# Patient Record
Sex: MALE | Race: WHITE | NOT HISPANIC OR LATINO | Employment: FULL TIME | URBAN - METROPOLITAN AREA
[De-identification: names, ages, dates, MRNs, and addresses within clinical notes are randomized per-mention and may not be internally consistent; named-entity substitution may affect disease eponyms.]

---

## 2017-02-10 ENCOUNTER — ALLSCRIPTS OFFICE VISIT (OUTPATIENT)
Dept: OTHER | Facility: OTHER | Age: 42
End: 2017-02-10

## 2017-06-16 ENCOUNTER — GENERIC CONVERSION - ENCOUNTER (OUTPATIENT)
Dept: OTHER | Facility: OTHER | Age: 42
End: 2017-06-16

## 2017-08-07 ENCOUNTER — GENERIC CONVERSION - ENCOUNTER (OUTPATIENT)
Dept: OTHER | Facility: OTHER | Age: 42
End: 2017-08-07

## 2017-08-25 ENCOUNTER — GENERIC CONVERSION - ENCOUNTER (OUTPATIENT)
Dept: OTHER | Facility: OTHER | Age: 42
End: 2017-08-25

## 2017-09-08 ENCOUNTER — GENERIC CONVERSION - ENCOUNTER (OUTPATIENT)
Dept: OTHER | Facility: OTHER | Age: 42
End: 2017-09-08

## 2017-09-09 LAB
A/G RATIO (HISTORICAL): 2 (ref 1.2–2.2)
ALBUMIN SERPL BCP-MCNC: 4.6 G/DL (ref 3.5–5.5)
ALP SERPL-CCNC: 59 IU/L (ref 39–117)
ALT SERPL W P-5'-P-CCNC: 50 IU/L (ref 0–44)
AST SERPL W P-5'-P-CCNC: 43 IU/L (ref 0–40)
BILIRUB SERPL-MCNC: 0.6 MG/DL (ref 0–1.2)
BUN SERPL-MCNC: 14 MG/DL (ref 6–24)
BUN/CREA RATIO (HISTORICAL): 15 (ref 9–20)
CALCIUM SERPL-MCNC: 9.6 MG/DL (ref 8.7–10.2)
CHLORIDE SERPL-SCNC: 99 MMOL/L (ref 96–106)
CHOLEST SERPL-MCNC: 124 MG/DL (ref 100–199)
CHOLEST/HDLC SERPL: 3.1 RATIO UNITS (ref 0–5)
CO2 SERPL-SCNC: 26 MMOL/L (ref 18–29)
CREAT SERPL-MCNC: 0.92 MG/DL (ref 0.76–1.27)
DEPRECATED RDW RBC AUTO: 13 % (ref 12.3–15.4)
EGFR AFRICAN AMERICAN (HISTORICAL): 117 ML/MIN/1.73
EGFR-AMERICAN CALC (HISTORICAL): 101 ML/MIN/1.73
GLUCOSE SERPL-MCNC: 84 MG/DL (ref 65–99)
HCT VFR BLD AUTO: 44 % (ref 37.5–51)
HDLC SERPL-MCNC: 40 MG/DL
HGB BLD-MCNC: 15 G/DL (ref 12.6–17.7)
LDLC SERPL CALC-MCNC: 63 MG/DL (ref 0–99)
MCH RBC QN AUTO: 29.6 PG (ref 26.6–33)
MCHC RBC AUTO-ENTMCNC: 34.1 G/DL (ref 31.5–35.7)
MCV RBC AUTO: 87 FL (ref 79–97)
PLATELET # BLD AUTO: 268 X10E3/UL (ref 150–379)
POTASSIUM SERPL-SCNC: 5 MMOL/L (ref 3.5–5.2)
RBC (HISTORICAL): 5.07 X10E6/UL (ref 4.14–5.8)
SODIUM SERPL-SCNC: 140 MMOL/L (ref 134–144)
TOT. GLOBULIN, SERUM (HISTORICAL): 2.3 G/DL (ref 1.5–4.5)
TOTAL PROTEIN (HISTORICAL): 6.9 G/DL (ref 6–8.5)
TRIGL SERPL-MCNC: 106 MG/DL (ref 0–149)
VLDLC SERPL CALC-MCNC: 21 MG/DL (ref 5–40)
WBC # BLD AUTO: 6.1 X10E3/UL (ref 3.4–10.8)

## 2017-10-30 ENCOUNTER — ALLSCRIPTS OFFICE VISIT (OUTPATIENT)
Dept: OTHER | Facility: OTHER | Age: 42
End: 2017-10-30

## 2017-10-31 NOTE — PROGRESS NOTES
Assessment  Assessed    1  Cath Stent 1 Proximal Left Anterior Descending Artery   2  Coronary artery disease (414 00) (I25 10)   3  Family history of myocardial infarction (V17 3) (Z82 49)   4  History of anterolateral myocardial infarction (412) (I25 2)   5  HTN (hypertension) (401 9) (I10)   6  Hypercholesteremia (272 0) (E78 00)   7  Impaired fasting glucose (790 21) (R73 01)    Plan  Cath Stent 1 Proximal Left Anterior Descending Artery, Coronary artery disease, Family  history of myocardial infarction, PMH: History of anterolateral myocardial infarction, HTN  (hypertension), Hypercholesteremia, Impaired fasting glucose    · (1) COMPREHENSIVE METABOLIC PANEL; Status:Active; Requested for:30Oct2018; Perform:[facility]; Due:30Oct2019;Ordered; For:Cath Stent 1 Proximal Left Anterior Descending Artery, Coronary artery disease, Family history of myocardial infarction, PMH: History of anterolateral myocardial infarction, HTN (hypertension), Hypercholesteremia, Impaired fasting glucose; Ordered By:Donato Moon;  Coronary artery disease    · Clopidogrel Bisulfate 75 MG Oral Tablet   Rx By: Savannah Adrian; Dispense: 90 Days ; #:90 TAB; Refill: 3;For: Coronary artery disease; ROBB = N; Sent To: [facility]; Last Updated By: Nadine Edwards; 10/30/2017 11:59:47 AM   · (1) LIPID PANEL, FASTING; Status:Active; Requested for:30Oct2018; Perform:[facility]; Due:30Oct2019;Ordered; For:Coronary artery disease; Ordered By:Donato Moon;   · Follow-up visit in 1 year Evaluation and Treatment  Follow-up  Status: Hold For -  Scheduling  Requested for: 47QTC9707   Ordered; For: Coronary artery disease; Ordered By: Nadine Edwards Performed:  Due: 81DJE7577  Coronary artery disease, Health Maintenance    · EKG/ECG- POC; Status:Complete;   Done: 24FYT2939 11:45AM   Perform: In Office; Last Updated [place] Robyn; 10/30/2017 11:45:26 AM;Ordered; For:Coronary artery disease, Health Maintenance; Ordered By:Donato Moon;          Stress Test Exercise; Status:Active; Requested RRF:45NDK4288;   Perform:[facility]; [id number];JD; For:Coronary artery disease; Ordered By:Donato Moon;      Discussion/Summary  Cardiology Discussion Summary Free Text Note Form [facility]:   Dear Dr Ghulam Spence,  had the pleasure of seeing Yani Meyers for a follow-up visit for coronary artery disease at the [facility] office   - Anterior wall MI 12/15, preserved LVEF, 90% LAD s/p ADELAIDA, with small vessel disease as well  He is on ASA and Plavix  Treadmill stress test  was unremarkable with over 12 minutes of exercise  He has a physical job  Not a lot of regimented exercise outside of work but no exertional symptoms  abuse - he quit  - continues on atorvastatin with excellent lipid control   - well controlled  fasting glucose - has normalized  Latest was 84  visit with surveillance labs  Chief Complaint  Chief Complaint Free Text Note Form: Six month follow up with EKG   Chief Complaint Chronic Condition [facility]: Patient is here today for follow up of chronic conditions described in HPI  History of Present Illness  Cardiology HPI Free Text Note Form [facility]: Yani Meyers returns for a follow-up visit  He is doing very well without any cardiovascular symptoms  He denies palpitations, lower extremity edema, shortness of breath, dyspnea on exertion, chest pain, lightheadedness, edema  He has been tolerating medical therapy well  He has been on clopidogrel now 2 years since his infarct  He has normal LV function  He takes aspirin, statin  His cholesterol levels continue to be under excellent control  He does have minimally elevated liver function tests, less than 1 times normal  This predates his statin usage as well  He has a very physically active job, has little time more energy for regimented exercise outside of work  He offers no exertional symptoms  He remains tobacco free  Says he has no interest in smoking  Active Problems  Problems    1   Anxiety (300 00) (F41 9)   2  Cath Stent 1 Proximal Left Anterior Descending Artery   3  Coronary artery disease (414 00) (I25 10)   4  Epididymitis, left (604 90) (N45 1)   5  Family history of myocardial infarction (V17 3) (Z82 49)   6  Glaucoma (365 9) (H40 9)   7  Heel pain, right (729 5) (M79 671)   8  History of anterolateral myocardial infarction (412) (I25 2)   9  HTN (hypertension) (401 9) (I10)   10  Hypercholesteremia (272 0) (E78 00)   11  Impaired fasting glucose (790 21) (R73 01)   12  Left foot pain (729 5) (M79 672)   13  Lump of skin (782 2) (R22 9)   14  Denied: History of Mental health disorder   15  No advance directives (V49 89) (Z78 9)   16  Numerous moles (216 9) (D22 9)   17  Sciatica (724 3) (M54 30)   18  Denied: History of Substance abuse   19  Vitamin D deficiency (268 9) (E55 9)    Past Medical History  Problems    1  History of Diaphoresis (780 8) (R61)   2  History of anterolateral myocardial infarction (412) (I25 2)   3  History of cardiac catheterization (V45 89) (Z98 890)   4  History of chest pain (V13 89) (Z87 898)   5  History of hypercholesterolemia (V12 29) (Z86 39)   6  Denied: History of Mental health disorder   7  Denied: History of Substance abuse  Active Problems And Past Medical History Reviewed: The active problems and past medical history were reviewed and updated today  Surgical History  Problems    1  History of Appendectomy   2  History of Eye Surgery   3  History of Tonsillectomy    Family History  Mother    1  Family history of blindness (V19 0) (Z82 1)   2  No family history of mental disorder   3  Denied: Family history of Substance abuse in family  Father    4  Family history of cardiac disorder (V17 49) (Z82 49)   5  Family history of diabetes mellitus (V18 0) (Z83 3)   6  No family history of mental disorder   7  Denied: Family history of Substance abuse in family  Maternal Aunt    8  Family history of blindness (V19 0) (Z82 1)  Family History    9   Family history of heart disease (V17 49) (Z82 49)   10  Family history of hypertension (V17 49) (Z82 49)    Social History  Problems    · Always uses seat belt   · Dental care, occasionally   · Employed   · Exercise: Walking   · Former smoker (O57 18) (F04 770)   · Lives with spouse   · Living Situation: Supportive and safe   ·    · No advance directives (V49 89) (Z78 9)   · No living will   · Non drinker / no alcohol use   · Occasional caffeine consumption  Social History Reviewed: The social history was reviewed and updated today  The social history was reviewed and is unchanged  Current Meds   1  Aspirin 81 MG Oral Tablet Chewable; USE AS DIRECTED; Therapy: 86Uwb2876 to (Last Rx:62Yox0738) Ordered   2  Ativan 0 5 MG Oral Tablet; as needed; Therapy: (Jessica Wade) to Recorded   3  Atorvastatin Calcium 40 MG Oral Tablet; TAKE ONE TABLET BY MOUTH ONCE DAILY AS   DIRECTED; Therapy: 38DGZ0685 to ()  Requested for: 99LSJ7433; Last   Rx:30Jun2017 Ordered   4  Clopidogrel Bisulfate 75 MG Oral Tablet; TAKE ONE TABLET BY MOUTH ONCE DAILY; Therapy: 79FQA1043 to (IMMFKKEF:57ILK2734)  Requested for: 44CDA5983; Last   JOHNSON:99WER1098 Ordered   5  Cyclobenzaprine HCl - 10 MG Oral Tablet; 1 tab prn only  Requested for: 82PXC1011;   Last Rx:16Mxt3923 Ordered   6  Dorzolamide HCl - 2 % Ophthalmic Solution; INSTILL 1 DROP IN THE LEFT EYE TWICE   DAILY; Therapy: (Franceen Boeck) to Recorded   7  Fluorometholone 0 1 % Ophthalmic Suspension; INSTILL 1 DROP INTO LEFT EYE   ONCE DAILY; Therapy: (Franceen Boeck) to Recorded   8  Lisinopril 20 MG Oral Tablet; TAKE ONE TABLET BY MOUTH ONCE DAILY; Therapy: 63HVI2118 to [phone])  Requested for: 31ADS8767; Last   Rx:25Jan2017; Status: ACTIVE - Transmit to [facility] Verification Ordered   9  Meloxicam 7 5 MG Oral Tablet; TAKE 1 TABLET Twice daily PRN;    Therapy: 57KNB1153 to )  Requested for: 54ULB7824; Last   Rx:64Ccp4827 Ordered   10  Metoprolol Succinate ER 50 MG Oral Tablet Extended Release 24 Hour; TAKE ONE    TABLET BY MOUTH ONCE DAILY; Therapy: 79VQM2056 to ([phone])  Requested for: 19VDA6698; Last    Rx:48Vsy3603 Ordered   11  Nitrostat 0 4 MG Sublingual Tablet Sublingual; PLACE 1 TABLET UNDER THE TONGUE    AS NEEDED FOR CHEST PAIN;    Therapy: (Melba Flores) to Recorded   12  Vitamin D3 2000 UNIT Oral Tablet; 1 Tab daily; Therapy: 42WFL4455 to Recorded  Medication List Reviewed: The medication list was reviewed and updated today  Allergies  Medication    1  Penicillins   2  Zithromax TABS  Non-Medication    3  No Known Environmental Allergies   4  No Known Food Allergies    Vitals  Vital Signs    Recorded: 53PFV1199 11:43AM   Heart Rate 72   Systolic 961   Diastolic 78   Height 5 ft 8 in   Weight 173 lb    BMI Calculated 26 3   BSA Calculated 1 92     Physical Exam    Constitutional   General appearance: No acute distress, well appearing and well nourished  Eyes   Conjunctiva and Sclera examination: Conjunctiva pink, sclera anicteric  Ears, Nose, Mouth, and Throat - Oropharynx: Clear, nares are clear, mucous membranes are moist    Neck   Neck and thyroid: Normal, supple, trachea midline, no thyromegaly  Pulmonary   Respiratory effort: No increased work of breathing or signs of respiratory distress  Auscultation of lungs: Clear to auscultation, no rales, no rhonchi, no wheezing, good air movement  Cardiovascular   Auscultation of heart: Normal rate and rhythm, normal S1 and S2, no murmurs  Carotid pulses: Normal, 2+ bilaterally  Pedal pulses: Normal, 2+ bilaterally  Examination of extremities for edema and/or varicosities: Normal     Abdomen   Abdomen: Non-tender and no distention  Musculoskeletal Gait and station: Normal gait  -- Digits and nails: Normal without clubbing or cyanosis     Skin - Skin and subcutaneous tissue: Normal without rashes or lesions  Skin is warm and well perfused, normal turgor  Neurologic - Cranial nerves: II - XII intact  -- Speech: Normal     Psychiatric - Orientation to person, place, and time: Normal -- Mood and affect: Normal       Results/Data  Diagnostic Studies Reviewed Cardio: I personally reviewed the recording/images in the office today  My interpretation follows  Lab Review: 4/16 - comprehensive metabolic panel normal with creatinine 0 8  Total cholesterol 123, triglycerides 72, HDL 41, LDL 68, VLDL 14  Stress Test: 1/16 - treadmill EKG stress test - 12 5 minutes of exercise, no symptoms, normal stress test    Echocardiogram/TEETEE: 12/15 - EF 60%  Catheterization: 12/15 - 90% LAD s/p ADELAIDA  80% PDA, 60% D1  ECG Report: 1/6/16 - Sinus bradycardia, otherwise normal - sinus rhythm with sinus arrhythmia   (1) COMPREHENSIVE METABOLIC PANEL 64XIV3867 75:87FD Emaline Beat     Test Name Result Flag Reference   Glucose, Serum 84 mg/dL  65-99   BUN 14 mg/dL  6-24   Creatinine, Serum 0 92 mg/dL  0 76-1 27   BUN/Creatinine Ratio 15  9-20   Sodium, Serum 140 mmol/L  134-144   Potassium, Serum 5 0 mmol/L  3 5-5 2   Chloride, Serum 99 mmol/L     Carbon Dioxide, Total 26 mmol/L  18-29   Calcium, Serum 9 6 mg/dL  8 7-10 2   Protein, Total, Serum 6 9 g/dL  6 0-8 5   Albumin, Serum 4 6 g/dL  3 5-5 5   Globulin, Total 2 3 g/dL  1 5-4 5   A/G Ratio 2 0  1 2-2 2   Bilirubin, Total 0 6 mg/dL  0 0-1 2   Alkaline Phosphatase, S 59 IU/L     AST (SGOT) 43 IU/L H 0-40   ALT (SGPT) 50 IU/L H 0-44   eGFR If NonAfricn Am 101 mL/min/1 73  >59   eGFR If Africn Am 117 mL/min/1 73  >59     (1) CBC/ PLT (NO DIFF) 52LXG8330 11:59AM Emaline Beat     Test Name Result Flag Reference   WBC 6 1 x10E3/uL  3 4-10 8   RBC 5 07 x10E6/uL  4 14-5 80   Hemoglobin 15 0 g/dL  12 6-17 7   Hematocrit 44 0 %  37 5-51 0   MCV 87 fL  79-97   MCH 29 6 pg  26 6-33 0   MCHC 34 1 g/dL  31 5-35 7   RDW 13 0 %  12 3-15 4   Platelets 929 D05F6/EE  150-379     (1) LIPID PANEL, FASTING 03Ctm8262 11:59AM Gladis Garcia     Test Name Result Flag Reference   Cholesterol, Total 124 mg/dL  100-199   Triglycerides 106 mg/dL  0-149   HDL Cholesterol 40 mg/dL  >39   VLDL Cholesterol Rashad 21 mg/dL  5-40   LDL Cholesterol Calc 63 mg/dL  0-99   T  Chol/HDL Ratio 3 1 ratio units  0 0-5 0   T  Chol/HDL Ratio                                                             Men  Women                                               1/2 Avg  Risk  3 4    3 3                                                   Avg Risk  5 0    4 4                                                2X Avg  Risk  9 6    7 1                                                3X Avg  Risk 23 4   11 0     Signatures   Electronically signed by : Antonietta Evans MD; Oct 30 2017 11:59AM EST                       (Author)

## 2017-12-15 ENCOUNTER — GENERIC CONVERSION - ENCOUNTER (OUTPATIENT)
Dept: FAMILY MEDICINE CLINIC | Facility: HOSPITAL | Age: 42
End: 2017-12-15

## 2018-01-13 VITALS
HEART RATE: 72 BPM | WEIGHT: 178 LBS | HEIGHT: 68 IN | SYSTOLIC BLOOD PRESSURE: 112 MMHG | DIASTOLIC BLOOD PRESSURE: 80 MMHG | RESPIRATION RATE: 14 BRPM | BODY MASS INDEX: 26.98 KG/M2

## 2018-01-14 VITALS
BODY MASS INDEX: 26.22 KG/M2 | WEIGHT: 173 LBS | DIASTOLIC BLOOD PRESSURE: 78 MMHG | HEART RATE: 72 BPM | SYSTOLIC BLOOD PRESSURE: 112 MMHG | HEIGHT: 68 IN

## 2018-01-16 NOTE — MISCELLANEOUS
Message   Recorded as Task   Date: 01/19/2016 09:55 PM, Created By: Ethan Giraldo   Task Name: Follow Up   Assigned To: Cardio Howard Peguero   Regarding Patient: Pastor Arora, Status: Active   CommentMa  - 19 Jan 2016 9:55 PM     TASK CREATED  Can you please let Mr Marquez's know that his stress test was normal   He did great at 12min exercise and can continue with unrestricted activity of any kind  Called patient to let him know that his echo was normal      Active Problems    1  Acute MI, anterolateral wall (410 00) (I21 09)   2  Anxiety (300 00) (F41 9)   3  Coronary artery disease (414 00) (I25 10)   4  Diaphoresis (780 8) (R61)   5  Family history of myocardial infarction (V17 3) (Z82 49)   6  Glaucoma (365 9) (H40 9)   7  Heel pain, right (729 5) (M79 671)   8  HTN (hypertension) (401 9) (I10)   9  Hypercholesteremia (272 0) (E78 0)   10  Impaired fasting glucose (790 21) (R73 01)   11  S/P drug eluting coronary stent placement (V45 82) (Z95 5)   12  Sciatica (724 3) (M54 30)   13  Vitamin D deficiency (268 9) (E55 9)    Current Meds   1  Aspirin 81 MG Oral Tablet Chewable; USE AS DIRECTED; Therapy: 42Tnc6274 to (Last Rx:37Imk9207) Ordered   2  Atorvastatin Calcium 40 MG Oral Tablet; TAKE 1 TABLET DAILY AS DIRECTED; Therapy: 70HSD4947 to (Evaluate:59Kby2099)  Requested for: 64RMJ7661; Last   Rx:05Jan2016 Ordered   3  Clopidogrel Bisulfate 75 MG Oral Tablet; TAKE 1 TABLET DAILY  Requested for:   99DEG3553; Last Rx:15Jan2016 Ordered   4  Dorzolamide HCl - 2 % Ophthalmic Solution; INSTILL 1 DROP IN THE LEFT EYE   TWICE DAILY; Therapy: (Philip Freddy) to Recorded   5  Fluorometholone 0 1 % Ophthalmic Suspension; instill 1 drop into both eyes once daily; Therapy: (Philip Freddy) to Recorded   6  Lisinopril 20 MG Oral Tablet; TAKE ONE TABLET BY MOUTH ONCE DAILY; Therapy: 29MRW2383 to (Evaluate:17Lnz1813)  Requested for: 58ZLY8690; Last   Rx:15Jan2016 Ordered   7  Metoprolol Succinate ER 50 MG Oral Tablet Extended Release 24 Hour; TAKE ONE   TABLET BY MOUTH ONCE DAILY; Therapy: 05VAD0877 to (Evaluate:16Zaf9526)  Requested for: 20INR6142; Last   Rx:30Aug7900 Ordered   8  Nitrostat 0 4 MG Sublingual Tablet Sublingual; PLACE 1 TABLET UNDER THE TONGUE   AS NEEDED FOR CHEST PAIN;   Therapy: (Loree Mares) to Recorded   9  Vitamin D3 2000 UNIT Oral Tablet; 1 Tab daily; Therapy: 72KBK2011 to Recorded    Allergies    1  Penicillins   2  Zithromax TABS    3  No Known Environmental Allergies   4   No Known Food Allergies    Signatures   Electronically signed by : Marleni Kwan, ; Jan 22 2016  3:35PM EST                       (Author)

## 2018-02-05 ENCOUNTER — OFFICE VISIT (OUTPATIENT)
Dept: FAMILY MEDICINE CLINIC | Facility: HOSPITAL | Age: 43
End: 2018-02-05
Payer: COMMERCIAL

## 2018-02-05 VITALS
DIASTOLIC BLOOD PRESSURE: 84 MMHG | TEMPERATURE: 98.4 F | HEIGHT: 68 IN | RESPIRATION RATE: 16 BRPM | BODY MASS INDEX: 26.07 KG/M2 | SYSTOLIC BLOOD PRESSURE: 128 MMHG | WEIGHT: 172 LBS | HEART RATE: 68 BPM

## 2018-02-05 DIAGNOSIS — J01.90 ACUTE NON-RECURRENT SINUSITIS, UNSPECIFIED LOCATION: Primary | ICD-10-CM

## 2018-02-05 PROBLEM — J01.80 OTHER ACUTE SINUSITIS: Status: ACTIVE | Noted: 2018-02-05

## 2018-02-05 PROCEDURE — 99213 OFFICE O/P EST LOW 20 MIN: CPT | Performed by: PHYSICIAN ASSISTANT

## 2018-02-05 RX ORDER — CHOLECALCIFEROL (VITAMIN D3) 125 MCG
1 CAPSULE ORAL DAILY
COMMUNITY
Start: 2015-12-17

## 2018-02-05 RX ORDER — ATORVASTATIN CALCIUM 40 MG/1
1 TABLET, FILM COATED ORAL DAILY
COMMUNITY
Start: 2016-01-05 | End: 2018-03-19 | Stop reason: SDUPTHER

## 2018-02-05 RX ORDER — METOPROLOL SUCCINATE 50 MG/1
1 TABLET, EXTENDED RELEASE ORAL DAILY
COMMUNITY
Start: 2014-12-29 | End: 2018-11-08 | Stop reason: SDUPTHER

## 2018-02-05 RX ORDER — DORZOLAMIDE HCL 20 MG/ML
1 SOLUTION/ DROPS OPHTHALMIC DAILY
COMMUNITY
End: 2022-06-29

## 2018-02-05 RX ORDER — CYCLOBENZAPRINE HCL 10 MG
1 TABLET ORAL 3 TIMES DAILY PRN
COMMUNITY
End: 2018-05-07 | Stop reason: ALTCHOICE

## 2018-02-05 RX ORDER — FLUOROMETHOLONE 0.1 %
1 SUSPENSION, DROPS(FINAL DOSAGE FORM)(ML) OPHTHALMIC (EYE) DAILY
COMMUNITY

## 2018-02-05 RX ORDER — LISINOPRIL 20 MG/1
1 TABLET ORAL DAILY
COMMUNITY
Start: 2014-12-29 | End: 2018-02-16 | Stop reason: SDUPTHER

## 2018-02-05 RX ORDER — NITROGLYCERIN 0.4 MG/1
TABLET SUBLINGUAL
COMMUNITY
End: 2018-05-21 | Stop reason: SDUPTHER

## 2018-02-05 RX ORDER — LORAZEPAM 0.5 MG/1
1 TABLET ORAL DAILY PRN
COMMUNITY
End: 2018-05-07 | Stop reason: ALTCHOICE

## 2018-02-05 RX ORDER — CEFUROXIME AXETIL 250 MG/1
250 TABLET ORAL EVERY 12 HOURS SCHEDULED
Qty: 20 TABLET | Refills: 0 | Status: SHIPPED | OUTPATIENT
Start: 2018-02-05 | End: 2018-02-15

## 2018-02-05 RX ORDER — MELOXICAM 7.5 MG/1
1 TABLET ORAL 2 TIMES DAILY PRN
COMMUNITY
Start: 2014-12-23 | End: 2019-08-01 | Stop reason: SDUPTHER

## 2018-02-05 NOTE — PROGRESS NOTES
Assessment/Plan:    Sinusitis  Ceftin 250 mg  Bid for 10 days  Subjective:      Patient ID: Amrita Adamson is a 38 y o  male  38year old white male c/o sinus pain and pressure since yesterday  Took Sudafed yesterday and Ibuprofen without any relief  No runny nose, no cough, no headache, but has facial pain/pressure  No sore throat and no ear ache  Called requesting meds  But was told had to come in  Review of Systems   Constitutional: Negative  HENT: Positive for sinus pain and sinus pressure  Negative for sore throat  Respiratory: Negative  Neurological: Negative  Objective:     Physical Exam   Constitutional: He appears well-developed and well-nourished  No distress  HENT:   Head: Normocephalic and atraumatic  Nose: Nose normal    Mouth/Throat: Oropharynx is clear and moist  No oropharyngeal exudate  Tm bulging and erythematous, with congestion noted in middle ears  Face appears swollen, puffy under right eye  Eyes: Conjunctivae and EOM are normal  Right eye exhibits no discharge  Left eye exhibits no discharge  No scleral icterus  Pulmonary/Chest: Effort normal and breath sounds normal  No respiratory distress  He has no wheezes  He has no rales  Skin: He is not diaphoretic

## 2018-02-16 DIAGNOSIS — I10 ESSENTIAL HYPERTENSION: Primary | ICD-10-CM

## 2018-02-16 RX ORDER — LISINOPRIL 20 MG/1
TABLET ORAL
Qty: 30 TABLET | Refills: 11 | Status: SHIPPED | OUTPATIENT
Start: 2018-02-16 | End: 2019-03-18 | Stop reason: SDUPTHER

## 2018-03-19 DIAGNOSIS — E78.00 HYPERCHOLESTEROLEMIA: Primary | ICD-10-CM

## 2018-03-19 RX ORDER — ATORVASTATIN CALCIUM 40 MG/1
TABLET, FILM COATED ORAL
Qty: 30 TABLET | Refills: 3 | Status: SHIPPED | OUTPATIENT
Start: 2018-03-19 | End: 2018-07-27 | Stop reason: SDUPTHER

## 2018-05-07 ENCOUNTER — APPOINTMENT (INPATIENT)
Dept: CT IMAGING | Facility: HOSPITAL | Age: 43
End: 2018-05-07
Payer: COMMERCIAL

## 2018-05-07 ENCOUNTER — HOSPITAL ENCOUNTER (EMERGENCY)
Facility: HOSPITAL | Age: 43
Discharge: HOME | End: 2018-05-07
Attending: SURGERY | Admitting: SURGERY
Payer: COMMERCIAL

## 2018-05-07 VITALS
BODY MASS INDEX: 25.76 KG/M2 | OXYGEN SATURATION: 100 % | HEIGHT: 68 IN | TEMPERATURE: 98.5 F | SYSTOLIC BLOOD PRESSURE: 126 MMHG | HEART RATE: 74 BPM | DIASTOLIC BLOOD PRESSURE: 68 MMHG | RESPIRATION RATE: 18 BRPM | WEIGHT: 170 LBS

## 2018-05-07 DIAGNOSIS — K52.9 COLITIS: Primary | ICD-10-CM

## 2018-05-07 DIAGNOSIS — R11.2 NAUSEA & VOMITING: ICD-10-CM

## 2018-05-07 LAB
ALBUMIN SERPL BCP-MCNC: 4.1 G/DL (ref 3.5–5)
ALP SERPL-CCNC: 59 U/L (ref 46–116)
ALT SERPL W P-5'-P-CCNC: 38 U/L (ref 12–78)
ANION GAP SERPL CALCULATED.3IONS-SCNC: 14 MMOL/L (ref 4–13)
AST SERPL W P-5'-P-CCNC: 26 U/L (ref 5–45)
BASOPHILS # BLD AUTO: 0.01 THOUSANDS/ΜL (ref 0–0.1)
BASOPHILS NFR BLD AUTO: 0 % (ref 0–1)
BILIRUB SERPL-MCNC: 0.5 MG/DL (ref 0.2–1)
BUN SERPL-MCNC: 12 MG/DL (ref 5–25)
CALCIUM SERPL-MCNC: 9.8 MG/DL (ref 8.3–10.1)
CHLORIDE SERPL-SCNC: 105 MMOL/L (ref 100–108)
CO2 SERPL-SCNC: 24 MMOL/L (ref 21–32)
CREAT SERPL-MCNC: 1.21 MG/DL (ref 0.6–1.3)
EOSINOPHIL # BLD AUTO: 0 THOUSAND/ΜL (ref 0–0.61)
EOSINOPHIL NFR BLD AUTO: 0 % (ref 0–6)
ERYTHROCYTE [DISTWIDTH] IN BLOOD BY AUTOMATED COUNT: 12.3 % (ref 11.6–15.1)
GFR SERPL CREATININE-BSD FRML MDRD: 72 ML/MIN/1.73SQ M
GLUCOSE SERPL-MCNC: 141 MG/DL (ref 65–140)
HCT VFR BLD AUTO: 41.4 % (ref 36.5–49.3)
HGB BLD-MCNC: 14.4 G/DL (ref 12–17)
LYMPHOCYTES # BLD AUTO: 1.06 THOUSANDS/ΜL (ref 0.6–4.47)
LYMPHOCYTES NFR BLD AUTO: 10 % (ref 14–44)
MCH RBC QN AUTO: 30.1 PG (ref 26.8–34.3)
MCHC RBC AUTO-ENTMCNC: 34.8 G/DL (ref 31.4–37.4)
MCV RBC AUTO: 87 FL (ref 82–98)
MONOCYTES # BLD AUTO: 0.35 THOUSAND/ΜL (ref 0.17–1.22)
MONOCYTES NFR BLD AUTO: 3 % (ref 4–12)
NEUTROPHILS # BLD AUTO: 9.12 THOUSANDS/ΜL (ref 1.85–7.62)
NEUTS SEG NFR BLD AUTO: 87 % (ref 43–75)
PLATELET # BLD AUTO: 248 THOUSANDS/UL (ref 149–390)
PMV BLD AUTO: 10.3 FL (ref 8.9–12.7)
POTASSIUM SERPL-SCNC: 4.7 MMOL/L (ref 3.5–5.3)
PROT SERPL-MCNC: 7.2 G/DL (ref 6.4–8.2)
RBC # BLD AUTO: 4.78 MILLION/UL (ref 3.88–5.62)
SODIUM SERPL-SCNC: 143 MMOL/L (ref 136–145)
WBC # BLD AUTO: 10.54 THOUSAND/UL (ref 4.31–10.16)

## 2018-05-07 PROCEDURE — 74177 CT ABD & PELVIS W/CONTRAST: CPT

## 2018-05-07 PROCEDURE — 80053 COMPREHEN METABOLIC PANEL: CPT | Performed by: SURGERY

## 2018-05-07 PROCEDURE — 96374 THER/PROPH/DIAG INJ IV PUSH: CPT

## 2018-05-07 PROCEDURE — 99284 EMERGENCY DEPT VISIT MOD MDM: CPT

## 2018-05-07 PROCEDURE — 36415 COLL VENOUS BLD VENIPUNCTURE: CPT

## 2018-05-07 PROCEDURE — 85025 COMPLETE CBC W/AUTO DIFF WBC: CPT | Performed by: SURGERY

## 2018-05-07 RX ORDER — CIPROFLOXACIN 500 MG/1
500 TABLET, FILM COATED ORAL EVERY 12 HOURS SCHEDULED
Qty: 20 TABLET | Refills: 0 | Status: SHIPPED | OUTPATIENT
Start: 2018-05-07 | End: 2018-05-17

## 2018-05-07 RX ORDER — METRONIDAZOLE 500 MG/1
500 TABLET ORAL EVERY 8 HOURS SCHEDULED
Qty: 30 TABLET | Refills: 0 | Status: SHIPPED | OUTPATIENT
Start: 2018-05-07 | End: 2018-05-17

## 2018-05-07 RX ORDER — ONDANSETRON 2 MG/ML
4 INJECTION INTRAMUSCULAR; INTRAVENOUS ONCE
Status: COMPLETED | OUTPATIENT
Start: 2018-05-07 | End: 2018-05-07

## 2018-05-07 RX ORDER — ASPIRIN 81 MG/1
81 TABLET ORAL DAILY
COMMUNITY

## 2018-05-07 RX ORDER — ONDANSETRON 4 MG/1
4 TABLET, ORALLY DISINTEGRATING ORAL EVERY 8 HOURS PRN
Qty: 15 TABLET | Refills: 0 | Status: SHIPPED | OUTPATIENT
Start: 2018-05-07 | End: 2019-08-01 | Stop reason: ALTCHOICE

## 2018-05-07 RX ADMIN — ONDANSETRON 4 MG: 2 INJECTION INTRAMUSCULAR; INTRAVENOUS at 14:36

## 2018-05-07 RX ADMIN — IOHEXOL 100 ML: 350 INJECTION, SOLUTION INTRAVENOUS at 15:35

## 2018-05-07 RX ADMIN — SODIUM CHLORIDE 1000 ML: 0.9 INJECTION, SOLUTION INTRAVENOUS at 14:36

## 2018-05-07 NOTE — ED NOTES
PT arrives in wheelchair, repetitively stating he is cold  PT able to walk to stretcher from [facility] without assistance        Aristeo Mcdonald RN  05/07/18 7716

## 2018-05-07 NOTE — ED PROVIDER NOTES
History  Chief Complaint   Patient presents with    Vomiting     PT states hes been vomiting every half hour since 230  this am, pt states lower abd pain and diarrhea started around then too   Diarrhea     39 yo M presenting with nausea, vomiting and diarrhea onset last night around 2:30 am  Patient reports he is unable to keep anything down  Initially threw up the food he ate but now is throwing up yellowish liquid with mucus  Has had 12 episodes of vomiting and 10 episodes of diarrhea since last night  Also feels very nauseous  Denies recent intake of suspicious food or change in diet/medications  No sick contacts  Denies CP, SOB or fevers/chills  History provided by:  Patient and spouse   used: No    Vomiting   Severity:  Moderate  Duration:  12 hours  Timing:  Constant  Number of daily episodes:  12  Quality:  Undigested food and bilious material  Progression:  Unchanged  Chronicity:  New  Recent urination:  Normal  Context comment:  Spontaneous  Relieved by:  Nothing  Worsened by:  Nothing  Ineffective treatments: Ibuprofen  Associated symptoms: abdominal pain, chills and diarrhea    Associated symptoms: no cough, no fever, no headaches, no myalgias, no sore throat and no URI    Abdominal pain:     Location:  Suprapubic, LLQ and RLQ    Quality: aching      Severity:  Moderate    Onset quality:  Sudden    Duration:  12 hours    Timing:  Constant    Progression:  Unchanged    Chronicity:  New  Diarrhea:     Quality:  Watery    Number of occurrences:  10    Severity:  Moderate    Duration:  12 hours    Timing:  Constant    Progression:  Unchanged  Risk factors: no alcohol use, no sick contacts, no suspect food intake and no travel to endemic areas        Prior to Admission Medications   Prescriptions Last Dose Informant Patient Reported? Taking?    Cholecalciferol (VITAMIN D3) 2000 units TABS   Yes Yes   Sig: Take 1 tablet by mouth daily   aspirin (ECOTRIN LOW STRENGTH) 81 mg EC tablet   Yes Yes   Sig: Take 81 mg by mouth daily   atorvastatin (LIPITOR) 40 mg tablet   No Yes   Sig: TAKE ONE TABLET BY MOUTH ONCE DAILY AS DIRECTED   dorzolamide (TRUSOPT) 2 % ophthalmic solution   Yes No   Sig: Administer 1 drop into the left eye daily   fluorometholone (FML) 0 1 % ophthalmic suspension   Yes No   Sig: Administer 1 drop into the left eye daily   lisinopril (ZESTRIL) 20 mg tablet   No Yes   Sig: TAKE ONE TABLET BY MOUTH ONCE DAILY   meloxicam (MOBIC) 7 5 mg tablet   Yes No   Sig: Take 1 tablet by mouth 2 (two) times a day as needed   metoprolol succinate (TOPROL-XL) 50 mg 24 hr tablet   Yes Yes   Sig: Take 1 tablet by mouth daily   nitroglycerin (NITROSTAT) 0 4 mg SL tablet   Yes No   Sig: Place under the tongue      Facility-Administered Medications: None       Past Medical History:   Diagnosis Date    Glaucoma     Heart disease     Hypertension     Uveitis     Visual impairment        Past Surgical History:   Procedure Laterality Date    APPENDECTOMY      CARDIAC SURGERY      CORONARY STENT PLACEMENT      TONSILLECTOMY         Family History   Problem Relation Age of Onset    Mental illness Neg Hx     Substance Abuse Neg Hx      I have reviewed and agree with the history as documented  Social History   Substance Use Topics    Smoking status: Former Smoker    Smokeless tobacco: Never Used    Alcohol use No      Comment: no         Review of Systems   Constitutional: Positive for chills  Negative for fever  HENT: Negative for sore throat  Eyes: Negative  Respiratory: Negative for cough  Cardiovascular: Negative for chest pain, palpitations and leg swelling  Gastrointestinal: Positive for abdominal pain, diarrhea, nausea and vomiting  Genitourinary: Negative  Musculoskeletal: Negative for myalgias  Skin: Negative for rash and wound  Neurological: Negative for dizziness, light-headedness, numbness and headaches     All other systems reviewed and are negative  Physical Exam  ED Triage Vitals [05/07/18 1346]   Temperature Pulse Respirations Blood Pressure SpO2   98 5 °F (36 9 °C) 77 18 152/98 100 %      Temp Source Heart Rate Source Patient Position - Orthostatic VS BP Location FiO2 (%)   Temporal Monitor Lying Right arm --      Pain Score       8           Orthostatic Vital Signs  Vitals:    05/07/18 1346 05/07/18 1603   BP: 152/98 126/68   Pulse: 77 74   Patient Position - Orthostatic VS: Lying Lying       Physical Exam   Constitutional: He is oriented to person, place, and time  He appears well-developed and well-nourished  No distress  HENT:   Head: Normocephalic and atraumatic  Mouth/Throat: Oropharynx is clear and moist    Eyes: EOM are normal  Pupils are equal, round, and reactive to light  Neck: Normal range of motion  Neck supple  Cardiovascular: Normal rate, regular rhythm and normal heart sounds  No murmur heard  Pulmonary/Chest: Effort normal and breath sounds normal  No respiratory distress  Abdominal: Soft  Normal appearance and bowel sounds are normal  There is generalized tenderness (mild)  Patient dry heaving in exam room   Musculoskeletal: Normal range of motion  He exhibits no tenderness  Lymphadenopathy:     He has no cervical adenopathy  Neurological: He is alert and oriented to person, place, and time  Skin: Skin is warm and dry  No rash noted  He is not diaphoretic  Psychiatric: He has a normal mood and affect  Nursing note and vitals reviewed        ED Medications  Medications   ondansetron (ZOFRAN) injection 4 mg (4 mg Intravenous Given 5/7/18 1436)   sodium chloride 0 9 % bolus 1,000 mL (0 mL Intravenous Stopped 5/7/18 1552)   iohexol (OMNIPAQUE) 350 MG/ML injection (SINGLE-DOSE) 100 mL (100 mL Intravenous Given 5/7/18 1535)       Diagnostic Studies  Results Reviewed     Procedure Component Value Units Date/Time    Comprehensive metabolic panel [[id number]]  (Abnormal) Collected:  05/07/18 1359    Lab Status: Final result Specimen:  Blood from Hand, Left Updated:  05/07/18 1438     Sodium 143 mmol/L      Potassium 4 7 mmol/L      Chloride 105 mmol/L      CO2 24 mmol/L      Anion Gap 14 (H) mmol/L      BUN 12 mg/dL      Creatinine 1 21 mg/dL      Glucose 141 (H) mg/dL      Calcium 9 8 mg/dL      AST 26 U/L      ALT 38 U/L      Alkaline Phosphatase 59 U/L      Total Protein 7 2 g/dL      Albumin 4 1 g/dL      Total Bilirubin 0 50 mg/dL      eGFR 72 ml/min/1 73sq m     Narrative:         National Kidney Disease Education Program recommendations are as follows:  GFR calculation is accurate only with a steady state creatinine  Chronic Kidney disease less than 60 ml/min/1 73 sq  meters  Kidney failure less than 15 ml/min/1 73 sq  meters  CBC and differential [[id number]]  (Abnormal) Collected:  05/07/18 1359    Lab Status:  Final result Specimen:  Blood from Hand, Left Updated:  05/07/18 1421     WBC 10 54 (H) Thousand/uL      RBC 4 78 Million/uL      Hemoglobin 14 4 g/dL      Hematocrit 41 4 %      MCV 87 fL      MCH 30 1 pg      MCHC 34 8 g/dL      RDW 12 3 %      MPV 10 3 fL      Platelets 687 Thousands/uL      Neutrophils Relative 87 (H) %      Lymphocytes Relative 10 (L) %      Monocytes Relative 3 (L) %      Eosinophils Relative 0 %      Basophils Relative 0 %      Neutrophils Absolute 9 12 (H) Thousands/µL      Lymphocytes Absolute 1 06 Thousands/µL      Monocytes Absolute 0 35 Thousand/µL      Eosinophils Absolute 0 00 Thousand/µL      Basophils Absolute 0 01 Thousands/µL                  CT abdomen pelvis with contrast   Final Result by Paxton Stallworth MD (05/07 1604)      Suggestion of mild thickening of the left colon  While there are diverticula along the left colon, a discretely inflamed diverticula is not seen and the long segment of involvement is more suggestive of mild inflammatory or infectious colitis              Workstation performed: [id number]                    Procedures  Procedures       Phone Contacts  ED Phone Contact    ED Course                               MDM  Number of Diagnoses or Management Options  Colitis: new and requires workup  Nausea & vomiting: new and does not require workup     Amount and/or Complexity of Data Reviewed  Clinical lab tests: ordered and reviewed  Tests in the radiology section of CPT®: ordered and reviewed    Patient Progress  Patient progress: stable    CritCare Time    Disposition  Final diagnoses:   Colitis   Nausea & vomiting     Time reflects when diagnosis was documented in both MDM as applicable and the Disposition within this note     Time User Action Codes Description Comment    5/7/2018  2:49 PM Tameka Jeremiah Add [K80 20] Cholelithiasis     5/7/2018  3:03 PM Tameka Jeremiah Remove [K80 20] Cholelithiasis     5/7/2018  4:22 PM Ma Devoid [K52 9] Colitis     5/7/2018  4:28 PM Ma Devoid [R11 0] Nausea     5/7/2018  4:28 PM Tameka Jeremiah Remove [R11 0] Nausea     5/7/2018  4:28 PM Tameka Jeremiah Add [R11 2] Nausea & vomiting       ED Disposition     ED Disposition Condition Comment    Discharge    Machelle Jerez discharge to home/self care    Condition at discharge: Stable      Follow-up Information     Follow up With Specialties Details Why Contact Info    Regla Sepulveda DO Internal Medicine Call For Recheck, If symptoms worsen [address] [address]  [phone]          Discharge Medication List as of 5/7/2018  4:38 PM      START taking these medications    Details   ciprofloxacin (CIPRO) 500 mg tablet Take 1 tablet (500 mg total) by mouth every 12 (twelve) hours for 10 days, Starting Mon 5/7/2018, Until Thu 5/17/2018, Normal      metroNIDAZOLE (FLAGYL) 500 mg tablet Take 1 tablet (500 mg total) by mouth every 8 (eight) hours for 10 days, Starting Mon 5/7/2018, Until Thu 5/17/2018, Normal      ondansetron (ZOFRAN-ODT) 4 mg disintegrating tablet Take 1 tablet (4 mg total) by mouth every 8 (eight) hours as needed for nausea or vomiting for up to 5 days, Starting Mon 5/7/2018, Until Sat 5/12/2018, Normal         CONTINUE these medications which have NOT CHANGED    Details   aspirin (ECOTRIN LOW STRENGTH) 81 mg EC tablet Take 81 mg by mouth daily, Historical Med      atorvastatin (LIPITOR) 40 mg tablet TAKE ONE TABLET BY MOUTH ONCE DAILY AS DIRECTED, Normal      Cholecalciferol (VITAMIN D3) 2000 units TABS Take 1 tablet by mouth daily, Starting u 12/17/2015, Historical Med      lisinopril (ZESTRIL) 20 mg tablet TAKE ONE TABLET BY MOUTH ONCE DAILY, Normal      metoprolol succinate (TOPROL-XL) 50 mg 24 hr tablet Take 1 tablet by mouth daily, Starting Mon 12/29/2014, Historical Med      dorzolamide (TRUSOPT) 2 % ophthalmic solution Administer 1 drop into the left eye daily, Historical Med      fluorometholone (FML) 0 1 % ophthalmic suspension Administer 1 drop into the left eye daily, Historical Med      meloxicam (MOBIC) 7 5 mg tablet Take 1 tablet by mouth 2 (two) times a day as needed, Starting Tue 12/23/2014, Historical Med      nitroglycerin (NITROSTAT) 0 4 mg SL tablet Place under the tongue, Historical Med           No discharge procedures on file      ED Provider  Electronically Signed by           Robson Sierra PA-C  05/18/18 0051

## 2018-05-07 NOTE — DISCHARGE INSTRUCTIONS
Drink plenty of fluids  Start with liquid diet and can slowly advance to soft diet as symptoms improve  Avoid greasy, fried, heavily condimented foods, dairy products and acidic foods like tomatoes, orange juice or other citrus fruits  Colitis   WHAT YOU NEED TO KNOW:   Colitis is swelling and irritation of your colon  Colitis may be caused by ulcers or a problem with your immune system  Bacteria, a virus, or a parasite may also cause colitis  The cause may not be known  You may have diarrhea, abdominal pain, fever, or blood or mucus in your bowel movement  DISCHARGE INSTRUCTIONS:   Return to the emergency department if:   · You have sudden trouble breathing  · Your bowel movements are black or have blood in them  · You have blood in your vomit  · You have severe abdominal pain or your abdomen is swollen and feels hard  · You have any of the following signs of dehydration:     ¨ Dizziness or weakness    ¨ Dry mouth, cracked lips, or severe thirst    ¨ Fast heartbeat or breathing    ¨ Urinating very little or not at all  Contact your healthcare provider if:   · Your symptoms get worse or do not go away  · You have a fever, chills, cough, or feel weak and achy  · You suddenly lose weight without trying  · You have questions or concerns about your condition or care  Medicines:   · Medicines  may be given to decrease inflammation in your colon and treat diarrhea  · Take your medicine as directed  Contact your healthcare provider if you think your medicine is not helping or if you have side effects  Tell him of her if you are allergic to any medicine  Keep a list of the medicines, vitamins, and herbs you take  Include the amounts, and when and why you take them  Bring the list or the pill bottles to follow-up visits  Carry your medicine list with you in case of an emergency  Manage your symptoms:   · Drink liquids as directed  to help prevent dehydration   Good liquids to drink include water, juice, and broth  Ask how much liquid to drink each day  You may need to drink an oral rehydration solution (ORS)  An ORS contains a balance of water, salt, and sugar to replace body fluids lost during diarrhea  · Eat a variety of healthy foods  Healthy foods include fruits, vegetables, whole-grain breads, beans, low-fat dairy products, lean meats, and fish  You may need to eat several small meals throughout the day instead of large meals  Avoid spicy foods, caffeine, chocolate, and foods high in fat  · Talk to your healthcare provider before you take NSAIDs  NSAIDs can cause worsen your symptoms if ulcers are causing your colitis  · Start to exercise when you feel better  Regular exercise helps your bowels work normally  Ask about the best exercise plan for you  Follow up with your healthcare provider as directed: You may need to return for a colonoscopy or other tests  Write down how often you have a bowel movements and what they look like  Bring this to your follow-up visits  Write down your questions so you remember to ask them during your visits  © 2017 [facility] Information is for End User's use only and may not be sold, redistributed or otherwise used for commercial purposes  All illustrations and images included in CareNotes® are the copyrighted property of [employer]  or [employer]  The above information is an  only  It is not intended as medical advice for individual conditions or treatments  Talk to your doctor, nurse or pharmacist before following any medical regimen to see if it is safe and effective for you  Acute Nausea and Vomiting   WHAT YOU NEED TO KNOW:   Acute nausea and vomiting start suddenly, worsen quickly, and last a short time  DISCHARGE INSTRUCTIONS:   Return to the emergency department if:   · You see blood in your vomit or your bowel movements      · You have sudden, severe pain in your chest and upper abdomen after hard vomiting or retching  · You have swelling in your neck and chest      · You are dizzy, cold, and thirsty and your eyes and mouth are dry  · You are urinating very little or not at all  · You have muscle weakness, leg cramps, and trouble breathing  · Your heart is beating much faster than normal      · You continue to vomit for more than 48 hours  Contact your healthcare provider if:   · You have frequent dry heaves (vomiting but nothing comes out)  · Your nausea and vomiting does not get better or go away after you use medicine  · You have questions or concerns about your condition or treatment  Medicines: You may need any of the following:  · Medicines  may be given to calm your stomach and stop your vomiting  You may also need medicines to help you feel more relaxed or to stop nausea and vomiting caused by motion sickness  · Gastrointestinal stimulants  are used to help empty your stomach and bowels  This may help decrease nausea and vomiting  · Take your medicine as directed  Contact your healthcare provider if you think your medicine is not helping or if you have side effects  Tell him or her if you are allergic to any medicine  Keep a list of the medicines, vitamins, and herbs you take  Include the amounts, and when and why you take them  Bring the list or the pill bottles to follow-up visits  Carry your medicine list with you in case of an emergency  Prevent or manage acute nausea and vomiting:   · Do not drink alcohol  Alcohol may upset or irritate your stomach  Too much alcohol can also cause acute nausea and vomiting  · Control stress  Headaches due to stress may cause nausea and vomiting  Find ways to relax and manage your stress  Get more rest and sleep  · Drink more liquids as directed  Vomiting can lead to dehydration  It is important to drink more liquids to help replace lost body fluids   Ask your healthcare provider how much liquid to drink each day and which liquids are best for you  Your provider may recommend that you drink an oral rehydration solution (ORS)  ORS contains water, salts, and sugar that are needed to replace the lost body fluids  Ask what kind of ORS to use, how much to drink, and where to get it  · Eat smaller meals, more often  Eat small amounts of food every 2 to 3 hours, even if you are not hungry  Food in your stomach may decrease your nausea  · Talk to your healthcare provider before you take over-the-counter (OTC) medicines  These medicines can cause serious problems if you use certain other medicines, or you have a medical condition  You may have problems if you use too much or use them for longer than the label says  Follow directions on the label carefully  Follow up with your healthcare provider as directed:  Write down your questions so you remember to ask them during your follow-up visits  © 2017 [facility] Information is for End User's use only and may not be sold, redistributed or otherwise used for commercial purposes  All illustrations and images included in CareNotes® are the copyrighted property of [employer] M [employer]  or [employer]  The above information is an  only  It is not intended as medical advice for individual conditions or treatments  Talk to your doctor, nurse or pharmacist before following any medical regimen to see if it is safe and effective for you  Acute Nausea and Vomiting   WHAT YOU NEED TO KNOW:   Acute nausea and vomiting start suddenly, worsen quickly, and last a short time  DISCHARGE INSTRUCTIONS:   Return to the emergency department if:   · You see blood in your vomit or your bowel movements  · You have sudden, severe pain in your chest and upper abdomen after hard vomiting or retching  · You have swelling in your neck and chest      · You are dizzy, cold, and thirsty and your eyes and mouth are dry      · You are urinating very little or not at all  · You have muscle weakness, leg cramps, and trouble breathing  · Your heart is beating much faster than normal      · You continue to vomit for more than 48 hours  Contact your healthcare provider if:   · You have frequent dry heaves (vomiting but nothing comes out)  · Your nausea and vomiting does not get better or go away after you use medicine  · You have questions or concerns about your condition or treatment  Medicines: You may need any of the following:  · Medicines  may be given to calm your stomach and stop your vomiting  You may also need medicines to help you feel more relaxed or to stop nausea and vomiting caused by motion sickness  · Gastrointestinal stimulants  are used to help empty your stomach and bowels  This may help decrease nausea and vomiting  · Take your medicine as directed  Contact your healthcare provider if you think your medicine is not helping or if you have side effects  Tell him or her if you are allergic to any medicine  Keep a list of the medicines, vitamins, and herbs you take  Include the amounts, and when and why you take them  Bring the list or the pill bottles to follow-up visits  Carry your medicine list with you in case of an emergency  Prevent or manage acute nausea and vomiting:   · Do not drink alcohol  Alcohol may upset or irritate your stomach  Too much alcohol can also cause acute nausea and vomiting  · Control stress  Headaches due to stress may cause nausea and vomiting  Find ways to relax and manage your stress  Get more rest and sleep  · Drink more liquids as directed  Vomiting can lead to dehydration  It is important to drink more liquids to help replace lost body fluids  Ask your healthcare provider how much liquid to drink each day and which liquids are best for you  Your provider may recommend that you drink an oral rehydration solution (ORS)   ORS contains water, salts, and sugar that are needed to replace the lost body fluids  Ask what kind of ORS to use, how much to drink, and where to get it  · Eat smaller meals, more often  Eat small amounts of food every 2 to 3 hours, even if you are not hungry  Food in your stomach may decrease your nausea  · Talk to your healthcare provider before you take over-the-counter (OTC) medicines  These medicines can cause serious problems if you use certain other medicines, or you have a medical condition  You may have problems if you use too much or use them for longer than the label says  Follow directions on the label carefully  Follow up with your healthcare provider as directed:  Write down your questions so you remember to ask them during your follow-up visits  © 2017 [address] [employer] Information is for End User's use only and may not be sold, redistributed or otherwise used for commercial purposes  All illustrations and images included in CareNotes® are the copyrighted property of [employer]  or [address]  The above information is an  only  It is not intended as medical advice for individual conditions or treatments  Talk to your doctor, nurse or pharmacist before following any medical regimen to see if it is safe and effective for you

## 2018-05-15 ENCOUNTER — TRANSCRIBE ORDERS (OUTPATIENT)
Dept: FAMILY MEDICINE CLINIC | Facility: HOSPITAL | Age: 43
End: 2018-05-15

## 2018-05-15 DIAGNOSIS — I25.118 CORONARY ARTERY DISEASE WITH STABLE ANGINA PECTORIS, UNSPECIFIED VESSEL OR LESION TYPE, UNSPECIFIED WHETHER NATIVE OR TRANSPLANTED HEART (HCC): Primary | ICD-10-CM

## 2018-05-21 ENCOUNTER — OFFICE VISIT (OUTPATIENT)
Dept: CARDIOLOGY CLINIC | Facility: CLINIC | Age: 43
End: 2018-05-21
Payer: COMMERCIAL

## 2018-05-21 VITALS
DIASTOLIC BLOOD PRESSURE: 70 MMHG | SYSTOLIC BLOOD PRESSURE: 120 MMHG | HEART RATE: 72 BPM | BODY MASS INDEX: 25.76 KG/M2 | WEIGHT: 170 LBS | HEIGHT: 68 IN

## 2018-05-21 DIAGNOSIS — E78.00 HYPERCHOLESTEREMIA: Primary | ICD-10-CM

## 2018-05-21 DIAGNOSIS — I10 ESSENTIAL HYPERTENSION: ICD-10-CM

## 2018-05-21 DIAGNOSIS — I25.118 CORONARY ARTERY DISEASE WITH STABLE ANGINA PECTORIS, UNSPECIFIED VESSEL OR LESION TYPE, UNSPECIFIED WHETHER NATIVE OR TRANSPLANTED HEART (HCC): ICD-10-CM

## 2018-05-21 DIAGNOSIS — I25.2 OLD MYOCARDIAL INFARCTION: ICD-10-CM

## 2018-05-21 PROCEDURE — 99214 OFFICE O/P EST MOD 30 MIN: CPT | Performed by: INTERNAL MEDICINE

## 2018-05-21 RX ORDER — NITROGLYCERIN 0.4 MG/1
0.4 TABLET SUBLINGUAL
Qty: 90 TABLET | Refills: 0 | Status: SHIPPED | OUTPATIENT
Start: 2018-05-21 | End: 2021-06-30 | Stop reason: SDUPTHER

## 2018-05-21 NOTE — PROGRESS NOTES
Follow-up - Cardiology   Forest Goldsmith 38 y o  male MRN: [id number]        Problems    1  Hypercholesteremia     2  Coronary artery disease with stable angina pectoris, unspecified vessel or lesion type, unspecified whether native or transplanted heart [facility])  Ambulatory referral to Cardiology    nitroglycerin (NITROSTAT) 0 4 mg SL tablet   3  Essential hypertension         Impression:      CAD   anterior wall infarction 2015 statuspost  Drug-eluting stent to the LAD   Treadmill stress test 1/16   no recent cardiac symptoms     hypertension   well controlled     hypercholesterolemia   well controlled, LDL 63 in 9/17      Plan:    Orders Placed This Encounter   Procedures    Basic metabolic panel    Hepatic function panel    Lipid panel     Varun Banks is doing very well from a cardiovascular standpoint, no cardiac symptoms, lab work as of 6 months ago was excellent and we are awaiting the follow-up lab work which she will have done in the next week or 2  I have given him a blood slip for next year and have requested an annual follow-up in my office  HPI:   Forest Goldsmith is a 38y o  year old male   With a history of anterior wall myocardial infarction in 2015 status post drug-eluting stent to the LAD  He underwent a surveillance stress test 1/16 which was normal   He had high exercise tolerance and continues to enjoy exercising intermittently although not as regularly as I have had via stop  He denies any cardiovascular symptoms, specifically no chest pain or shortness of breath  He denies palpitations, orthopnea, edema  His medical therapy is unchanged, he takes aspirin  His hypertension is under excellent control, his LDL was excellent as of 6 months ago which was 63  His daughter, 21years of age is about to move out of the house and to [place] where she is moving with her fiance  Review of Systems   Constitutional: Negative  HENT: Negative  Eyes: Negative      Respiratory: Negative  Cardiovascular: Negative  Gastrointestinal: Negative  Endocrine: Negative  Genitourinary: Negative  Musculoskeletal: Positive for arthralgias (Left shoulder pain)  Skin: Negative  Neurological: Negative  Hematological: Negative  Psychiatric/Behavioral: Negative  Past Medical History:   Diagnosis Date    Glaucoma     Heart disease     Hypertension     Uveitis     Visual impairment      History   Alcohol Use No     Comment: no      History   Drug Use No     History   Smoking Status    Former Smoker   Smokeless Tobacco    Never Used       Allergies: Allergies   Allergen Reactions    Azithromycin      Annotation - [id number]: Listed in old chart      Penicillins        Medications:     Current Outpatient Prescriptions:     aspirin (ECOTRIN LOW STRENGTH) 81 mg EC tablet, Take 81 mg by mouth daily, Disp: , Rfl:     atorvastatin (LIPITOR) 40 mg tablet, TAKE ONE TABLET BY MOUTH ONCE DAILY AS DIRECTED, Disp: 30 tablet, Rfl: 3    Cholecalciferol (VITAMIN D3) 2000 units TABS, Take 1 tablet by mouth daily, Disp: , Rfl:     dorzolamide (TRUSOPT) 2 % ophthalmic solution, Administer 1 drop into the left eye daily, Disp: , Rfl:     fluorometholone (FML) 0 1 % ophthalmic suspension, Administer 1 drop into the left eye daily, Disp: , Rfl:     lisinopril (ZESTRIL) 20 mg tablet, TAKE ONE TABLET BY MOUTH ONCE DAILY, Disp: 30 tablet, Rfl: 11    meloxicam (MOBIC) 7 5 mg tablet, Take 1 tablet by mouth 2 (two) times a day as needed, Disp: , Rfl:     metoprolol succinate (TOPROL-XL) 50 mg 24 hr tablet, Take 1 tablet by mouth daily, Disp: , Rfl:     nitroglycerin (NITROSTAT) 0 4 mg SL tablet, Place under the tongue, Disp: , Rfl:     ondansetron (ZOFRAN-ODT) 4 mg disintegrating tablet, Take 1 tablet (4 mg total) by mouth every 8 (eight) hours as needed for nausea or vomiting for up to 5 days, Disp: 15 tablet, Rfl: 0      Vitals:    05/21/18 1359   BP: 120/70   Pulse: 72     Weight (last 2 days)     Date/Time   Weight    05/21/18 1359  77 1 (170)            Physical Exam   Constitutional: He is oriented to person, place, and time  No distress  HENT:   Head: Normocephalic and atraumatic  Eyes: Conjunctivae are normal  No scleral icterus  Neck: Normal range of motion  No JVD present  Cardiovascular: Normal rate, regular rhythm, normal heart sounds and intact distal pulses  No murmur heard  Pulmonary/Chest: Effort normal and breath sounds normal  He has no wheezes  He has no rales  Musculoskeletal: He exhibits no edema  Neurological: He is alert and oriented to person, place, and time  Skin: Skin is warm and dry  He is not diaphoretic  Laboratory Studies:  Lab Results   Component Value Date     05/07/2018     09/08/2017     12/15/2015     12/14/2015    K 4 7 05/07/2018    K 5 0 09/08/2017    K 4 1 12/15/2015    K 4 1 12/14/2015     05/07/2018    CL 99 09/08/2017     12/15/2015     12/14/2015    CO2 24 05/07/2018    CO2 26 09/08/2017    CO2 27 12/15/2015    CO2 27 12/14/2015    GLUCOSE 141 (H) 05/07/2018    GLUCOSE 84 09/08/2017    GLUCOSE 97 12/15/2015    GLUCOSE 103 12/14/2015    CREATININE 1 21 05/07/2018    CREATININE 0 92 09/08/2017    CREATININE 1 01 12/15/2015    CREATININE 1 07 12/14/2015    BUN 12 05/07/2018    BUN 14 09/08/2017    BUN 16 12/15/2015    BUN 18 12/14/2015    MG 2 0 12/11/2015     Lab Results   Component Value Date    WBC 10 54 (H) 05/07/2018    WBC 6 1 09/08/2017    RBC 4 78 05/07/2018    RBC 5 16 12/15/2015    HGB 14 4 05/07/2018    HGB 15 0 09/08/2017    HCT 41 4 05/07/2018    HCT 44 0 09/08/2017    MCV 87 05/07/2018    MCV 87 09/08/2017    MCH 30 1 05/07/2018    MCH 29 6 09/08/2017    RDW 12 3 05/07/2018    RDW 13 0 09/08/2017     05/07/2018     09/08/2017     NT-proBNP: No results for input(s): NTBNP in the last 72 hours     Coags:    Lipid Profile:   Lab Results   Component Value Date CHOL 124 2017     Lab Results   Component Value Date    HDL 40 2017     Lab Results   Component Value Date    LDLCALC 63 2017     Lab Results   Component Value Date    TRIG 106 2017       Cardiac testing:     Results for orders placed during the hospital encounter of 12/12/15   Echo complete with contrast if indicated    [facility] [place] [address]  [phone]    Transthoracic Echocardiogram  2D, M-mode, Doppler, and Color Doppler    Study date:  13-Dec-2015    Patient: Jerman Fagan  MR number: [id number]  Account number: [de-identified]  : 1975  Age: 41 years  Gender: Male  Status: Inpatient  Location: Bedside  Height: 68 in  Weight: 179 5 lb  BP: 128/ 92 mmHg    Indications: Assess left ventricular function  Diagnoses: 786 50 - CHEST PAIN NOS    Sonographer:  DELIO Ferrell  Primary Physician:  Damian Ramos MD  Referring Physician:  Sai Knapp MD  Group:  [facility]  Interpreting Physician:  Sai Knapp MD    SUMMARY    LEFT VENTRICLE:  Size was normal   Systolic function was normal  Ejection fraction was estimated to be 60 %  There were no regional wall motion abnormalities  Wall thickness was normal     HISTORY: PRIOR HISTORY: HTN,HLD,MI,CAD    PROCEDURE: The procedure was performed at the bedside  This was a routine  study  The transthoracic approach was used  The study included complete 2D  imaging, M-mode, complete spectral Doppler, and color Doppler  Image quality  was good  LEFT VENTRICLE: Size was normal  Systolic function was normal  Ejection  fraction was estimated to be 60 %  There were no regional wall motion  abnormalities   Wall thickness was normal     RIGHT VENTRICLE: The size was normal  Systolic function was normal  Wall  thickness was normal     LEFT ATRIUM: Size was normal     RIGHT ATRIUM: Size was normal     MITRAL VALVE: Valve structure was normal  There was normal leaflet separation  DOPPLER: The transmitral velocity was within the normal range  There was no  evidence for stenosis  There was no regurgitation  AORTIC VALVE: The valve was trileaflet  Leaflets exhibited normal thickness and  normal cuspal separation  DOPPLER: Transaortic velocity was within the normal  range  There was no evidence for stenosis  There was no regurgitation  TRICUSPID VALVE: The valve structure was normal  There was normal leaflet  separation  DOPPLER: The transtricuspid velocity was within the normal range  There was no evidence for stenosis  There was no regurgitation  PULMONIC VALVE: Leaflets exhibited normal thickness, no calcification, and  normal cuspal separation  DOPPLER: The transpulmonic velocity was within the  normal range  There was no regurgitation  PERICARDIUM: There was no pericardial effusion  The pericardium was normal in  appearance  AORTA: The root exhibited normal size      SYSTEM MEASUREMENT TABLES    2D  %FS: 30 25 %  EF(Teich): 57 92 %  IVSd: 1 37 cm  LA Diam: 3 49 cm  LVIDd: 4 26 cm  LVIDs: 2 97 cm  LVPWd: 1 13 cm    PW  E/E': 6 53  MV E/A Ratio: 0 74    [employer] Accredited Echocardiography Laboratory    Prepared and electronically signed by    Geronimo Murillo MD  Signed 13-Dec-2015 09:51:51         Results for orders placed during the hospital encounter of 12/12/15   Cardiac catheterization    Narrative [address]  [phone]    [facility]    Invasive Cardiovascular Lab Complete Report    Patient: Sharmaine Gonzales  MR number: [id number]  Account number: [de-identified]  Study date: 2015  Gender: Male  : 1975  Height: 68 1 in  Weight: 179 5 lb  BSA: 1 96 m squared    Allergies: Penicillins, latex    Diagnostic Cardiologist:  Daphnie Hidalgo MD  Primary Physician:  Neetu Estes, DO    SUMMARY    CORONARY CIRCULATION:  Left main: Normal   LAD: The vessel was medium sized  Angiography showed severe atherosclerosis  Proximal LAD: There was a 30 % stenosis  Mid LAD: There was a tubular 90 % stenosis  This is a likely culprit for the  patient's clinical presentation  An intervention was performed  Circumflex: The  vessel was medium sized  Angiography showed moderate atherosclerosis  1st  obtuse marginal: There was a discrete 70 % stenosis in the distal third of the  vessel segment  The vessel bifurcates into two small branches at this lesion  Ramus intermedius: The vessel was medium sized  Angiography showed moderate  atherosclerosis  There was a discrete 50 % stenosis in the proximal third of  the vessel segment  In a second lesion, there was a discrete 60 % stenosis in  the middle third of the vessel segment  RCA: The vessel was medium to large  sized  Angiography showed mild atherosclerosis  1st posterolateral segment: There was a discrete 80 % stenosis in a small RPL1  CARDIAC STRUCTURES:  Global left ventricular function was normal  EF was estimated at 65 %  1ST LESION INTERVENTIONS:  A successful drug-eluting stent procedure was performed on the 90 % lesion in  the mid LAD  Following intervention there was an excellent angiographic  appearance with a 0 % residual stenosis  A Xience Alpine Rx 3 0 x 23mm  drug-eluting stent was placed across the lesion and deployed at a maximum  inflation pressure of 12 nathalia  IMPRESSIONS:  s/p PCI of mid LAD, Diffuse small vessel disease  RECOMMENDATIONS:  DAPT with ASA and Clopidogrel for 1 year  Medical therapy for small vessel  disease  INDICATIONS:  --  Possible CAD: myocardial infarction without ST elevation (NSTEMI)  PROCEDURES PERFORMED    --  Left heart catheterization with ventriculography  --  Left coronary angiography  --  Right coronary angiography  --  Inpatient  --  Coronary Catheterization (w/ LHC)  --  Coronary Drug Eluting Stent w/PTCA    --  Intervention on mid LAD: drug-eluting stent     PROCEDURE: The risks and alternatives of the procedures and conscious sedation  were explained to the patient and informed consent was obtained  The patient  was brought to the cath lab and placed on the table  The planned puncture sites  were prepped and draped in the usual sterile fashion  --  Right femoral artery access  The puncture site was infiltrated with local  anesthetic  The vessel was accessed using the modified Seldinger technique, a  wire was advanced into the vessel, and a sheath was advanced over the wire into  the vessel  --  Left heart catheterization with ventriculography  A catheter was advanced  over a guidewire into the ascending aorta  After recording ascending aortic  pressure, the catheter was advanced across the aortic valve and left  ventricular pressure was recorded  Ventriculography was performed  The catheter  was pulled back across the aortic valve and into the ascending aorta and  pullback pressures were obtained  --  Left coronary artery angiography  A catheter was advanced over a guidewire  into the aorta and positioned in the left coronary artery ostium under  fluoroscopic guidance  Angiography was performed  --  Right coronary artery angiography  A catheter was advanced over a guidewire  into the aorta and positioned in the right coronary artery ostium under  fluoroscopic guidance  Angiography was performed  --  Inpatient  --  Coronary Catheterization (w/ [facility])  LESION INTERVENTION: A successful drug-eluting stent procedure was performed on  the 90 % lesion in the mid LAD  Following intervention there was an excellent  angiographic appearance with a 0 % residual stenosis  --  Vessel setup was performed  A 6Fr  Launcher Ebu 3 75 guiding catheter was  used to cannulate the vessel  --  Vessel setup was performed  A BMW J 190cm wire was used to cross the  lesion      --  Balloon angioplasty was performed, using a Trek Rx 2 5 x 15mm balloon, with  3 inflations and a maximum inflation pressure of 10 nathalia  --  A Xience Alpine Rx 3 0 x 23mm drug-eluting stent was placed across the  lesion and deployed at a maximum inflation pressure of 12 nathalia  INTERVENTIONS:  --  Coronary Drug Eluting Stent w/PTCA  PROCEDURE COMPLETION: The patient tolerated the procedure well and was  discharged from the cath lab  TIMING: Test started at 10:43  Test concluded at  11:28  HEMOSTASIS: The sheath was removed over a wire and the Angioseal  delivery sheath was inserted into the femoral artery  Hemostasis was obtained  using a closure device ( Angioseal) deployed through the delivery sheath  MEDICATIONS GIVEN: Versed (2mg/2ml), 2 mg, IV, at 10:51  Fentanyl (1OOmcg/2  ml), 50 mcg, IV, at 10:51  1% Lidocaine, 10 ml, subcutaneously, at 10:53  Fentanyl (1OOmcg/2 ml), 25 mcg, IV, at 11:06  Versed (2mg/2ml), 1 mg, IV, at  11:06  Angiomax Bolus(250mg/50ml), 12 ml, IV, at 11:08  Angiomax Drip  (250mg/50ml), infusion rate of 28 ml/hr, IV, at 11:24  CONTRAST GIVEN: 30 ml  Omnipaque (350mg I/ml)  100 ml Omnipaque (350mg I /ml)  30 ml Omnipaque (350mg  I /ml)  RADIATION EXPOSURE: Fluoroscopy time: 8 7 min  HEMODYNAMICS: Hemodynamic assessment demonstrated normal LVEDP  VENTRICLES:   --  Global left ventricular function was normal  EF was estimated  at 65 %  CORONARY VESSELS:   --  The coronary circulation is right dominant  --  Left main: Normal   --  LAD: The vessel was medium sized  Angiography showed severe  atherosclerosis  --  Proximal LAD: There was a 30 % stenosis  --  Mid LAD: There was a tubular 90 % stenosis  This is a likely culprit for  the patient's clinical presentation  An intervention was performed  --   Circumflex: The vessel was medium sized  Angiography showed moderate  atherosclerosis  --  1st obtuse marginal: There was a discrete 70 % stenosis in  the distal third of the vessel segment  The vessel bifurcates into two small  branches at this lesion   -- Ramus intermedius: The vessel was medium sized  Angiography showed moderate atherosclerosis  There was a discrete 50 % stenosis  in the proximal third of the vessel segment  In a second lesion, there was a  discrete 60 % stenosis in the middle third of the vessel segment  --  RCA: The  vessel was medium to large sized  Angiography showed mild atherosclerosis  --   1st posterolateral segment: There was a discrete 80 % stenosis in a small RPL1  IMPRESSIONS:  s/p PCI of mid LAD, Diffuse small vessel disease  RECOMMENDATIONS  DAPT with ASA and Clopidogrel for 1 year  Medical therapy for small vessel  disease  Prepared and signed by    Alva Hernandez MD  Signed 2015 16:49:16    Study diagram    Angiographic findings  Native coronary lesions:  ·Proximal LAD: Lesion 1: 30 % stenosis  ·Mid LAD: Lesion 1: tubular, 90 % stenosis  ·OM1: Lesion 1: discrete, 70 % stenosis  ·Ramus intermedius: Lesion 1: discrete, 50 % stenosis  Lesion 2: discrete, 60 %  stenosis  ·RPL1: Lesion 1: discrete, 80 % stenosis  Intervention results  Native coronary lesions:  ·Successful drug-eluting stent of the 90 % stenosis in mid LAD  Appearance  excellent with 0 % residual stenosis  Stent: Xience Alpine Rx 3 0 x 23mm  drug-eluting      Hemodynamic tables    Pressures:  Baseline  Pressures:  - HR: 73  Pressures:  - Rhythm:  Pressures:  -- Aortic Pressure (S/D/M): 133/83/105  Pressures:  -- Left Ventricle (s/edp): 124/18/--    Outputs:  Baseline  Outputs:  -- CALCULATIONS: [age]  Outputs:  -- CALCULATIONS: Body Surface Area: 1 96  Outputs:  -- CALCULATIONS: Height in cm: 173 00  Outputs:  -- CALCULATIONS: Sex: Male  Outputs:  -- CALCULATIONS: Weight in k 60           Damon Roberts MD    Portions of the record may have been created with voice recognition software   Occasional wrong word or "sound a like" substitutions may have occurred due to the inherent limitations of voice recognition software   Read the chart carefully and recognize, using context, where substitutions have occurred

## 2018-07-27 DIAGNOSIS — E78.00 HYPERCHOLESTEROLEMIA: ICD-10-CM

## 2018-07-27 RX ORDER — ATORVASTATIN CALCIUM 40 MG/1
TABLET, FILM COATED ORAL
Qty: 30 TABLET | Refills: 3 | Status: SHIPPED | OUTPATIENT
Start: 2018-07-27 | End: 2018-12-06 | Stop reason: SDUPTHER

## 2018-09-08 LAB
ALBUMIN SERPL-MCNC: 4.7 G/DL (ref 3.5–5.5)
ALP SERPL-CCNC: 61 IU/L (ref 39–117)
ALT SERPL-CCNC: 41 IU/L (ref 0–44)
AST SERPL-CCNC: 37 IU/L (ref 0–40)
BILIRUB DIRECT SERPL-MCNC: 0.15 MG/DL (ref 0–0.4)
BILIRUB SERPL-MCNC: 0.7 MG/DL (ref 0–1.2)
BUN SERPL-MCNC: 15 MG/DL (ref 6–24)
BUN/CREAT SERPL: 14 (ref 9–20)
CALCIUM SERPL-MCNC: 9.7 MG/DL (ref 8.7–10.2)
CHLORIDE SERPL-SCNC: 99 MMOL/L (ref 96–106)
CHOLEST SERPL-MCNC: 123 MG/DL (ref 100–199)
CHOLEST/HDLC SERPL: 3 RATIO (ref 0–5)
CO2 SERPL-SCNC: 25 MMOL/L (ref 20–29)
CREAT SERPL-MCNC: 1.05 MG/DL (ref 0.76–1.27)
GLUCOSE SERPL-MCNC: 99 MG/DL (ref 65–99)
HDLC SERPL-MCNC: 41 MG/DL
LDLC SERPL CALC-MCNC: 68 MG/DL (ref 0–99)
POTASSIUM SERPL-SCNC: 5.1 MMOL/L (ref 3.5–5.2)
PROT SERPL-MCNC: 6.9 G/DL (ref 6–8.5)
SL AMB EGFR AFRICAN AMERICAN: 99 ML/MIN/1.73
SL AMB EGFR NON AFRICAN AMERICAN: 85 ML/MIN/1.73
SL AMB VLDL CHOLESTEROL CALC: 14 MG/DL (ref 5–40)
SODIUM SERPL-SCNC: 138 MMOL/L (ref 134–144)
TRIGL SERPL-MCNC: 72 MG/DL (ref 0–149)

## 2018-10-24 ENCOUNTER — TRANSCRIBE ORDERS (OUTPATIENT)
Dept: FAMILY MEDICINE CLINIC | Facility: HOSPITAL | Age: 43
End: 2018-10-24

## 2018-10-24 DIAGNOSIS — H40.9 GLAUCOMA: Primary | ICD-10-CM

## 2018-11-08 DIAGNOSIS — I10 ESSENTIAL HYPERTENSION: Primary | ICD-10-CM

## 2018-11-08 RX ORDER — METOPROLOL SUCCINATE 50 MG/1
TABLET, EXTENDED RELEASE ORAL
Qty: 30 TABLET | Refills: 11 | Status: SHIPPED | OUTPATIENT
Start: 2018-11-08 | End: 2019-12-02 | Stop reason: SDUPTHER

## 2018-11-13 ENCOUNTER — TRANSCRIBE ORDERS (OUTPATIENT)
Dept: FAMILY MEDICINE CLINIC | Facility: HOSPITAL | Age: 43
End: 2018-11-13

## 2018-11-13 DIAGNOSIS — L98.9 DISORDER OF SKIN OR SUBCUTANEOUS TISSUE: Primary | ICD-10-CM

## 2018-12-06 DIAGNOSIS — E78.00 HYPERCHOLESTEROLEMIA: ICD-10-CM

## 2018-12-06 RX ORDER — ATORVASTATIN CALCIUM 40 MG/1
TABLET, FILM COATED ORAL
Qty: 30 TABLET | Refills: 3 | Status: SHIPPED | OUTPATIENT
Start: 2018-12-06 | End: 2019-04-18 | Stop reason: SDUPTHER

## 2019-03-18 DIAGNOSIS — I10 ESSENTIAL HYPERTENSION: ICD-10-CM

## 2019-03-19 RX ORDER — LISINOPRIL 20 MG/1
TABLET ORAL
Qty: 30 TABLET | Refills: 11 | Status: SHIPPED | OUTPATIENT
Start: 2019-03-19 | End: 2020-04-17

## 2019-04-18 DIAGNOSIS — E78.00 HYPERCHOLESTEROLEMIA: ICD-10-CM

## 2019-04-18 RX ORDER — ATORVASTATIN CALCIUM 40 MG/1
TABLET, FILM COATED ORAL
Qty: 30 TABLET | Refills: 3 | Status: SHIPPED | OUTPATIENT
Start: 2019-04-18 | End: 2019-09-13 | Stop reason: SDUPTHER

## 2019-08-01 ENCOUNTER — HOSPITAL ENCOUNTER (OUTPATIENT)
Dept: RADIOLOGY | Facility: HOSPITAL | Age: 44
Discharge: HOME | End: 2019-08-01
Payer: COMMERCIAL

## 2019-08-01 ENCOUNTER — OFFICE VISIT (OUTPATIENT)
Dept: FAMILY MEDICINE CLINIC | Facility: HOSPITAL | Age: 44
End: 2019-08-01
Payer: COMMERCIAL

## 2019-08-01 VITALS
OXYGEN SATURATION: 96 % | WEIGHT: 169.4 LBS | HEART RATE: 80 BPM | DIASTOLIC BLOOD PRESSURE: 68 MMHG | SYSTOLIC BLOOD PRESSURE: 100 MMHG | TEMPERATURE: 99 F | BODY MASS INDEX: 25.67 KG/M2 | HEIGHT: 68 IN

## 2019-08-01 DIAGNOSIS — M79.672 LEFT FOOT PAIN: Primary | ICD-10-CM

## 2019-08-01 DIAGNOSIS — M79.672 LEFT FOOT PAIN: ICD-10-CM

## 2019-08-01 PROCEDURE — 1036F TOBACCO NON-USER: CPT | Performed by: PHYSICIAN ASSISTANT

## 2019-08-01 PROCEDURE — 73630 X-RAY EXAM OF FOOT: CPT

## 2019-08-01 PROCEDURE — 3008F BODY MASS INDEX DOCD: CPT | Performed by: PHYSICIAN ASSISTANT

## 2019-08-01 PROCEDURE — 99213 OFFICE O/P EST LOW 20 MIN: CPT | Performed by: PHYSICIAN ASSISTANT

## 2019-08-01 RX ORDER — MELOXICAM 7.5 MG/1
7.5 TABLET ORAL 2 TIMES DAILY PRN
Qty: 60 TABLET | Refills: 2 | Status: SHIPPED | OUTPATIENT
Start: 2019-08-01 | End: 2020-01-20 | Stop reason: ALTCHOICE

## 2019-08-01 NOTE — PROGRESS NOTES
Assessment/Plan:         Diagnoses and all orders for this visit:    Left foot pain  -     XR foot 3+ vw left; Future  -     meloxicam (MOBIC) 7 5 mg tablet; Take 1 tablet (7 5 mg total) by mouth 2 (two) times a day as needed for mild pain or moderate pain        Subjective:      Patient ID: Johnny Orantes is a 53 y o  male  53year old white male c/o left foot pain past 1 week  Had blister on top of left foot, did pop blister  Denies any recent injuries, or falls  Took Aleve without relief  Has chronic low back pain  On feet all day working  Review of Systems   Constitutional: Negative for chills, diaphoresis, fatigue and fever  Respiratory: Negative for cough and shortness of breath  Gastrointestinal: Negative for abdominal pain, nausea and vomiting  Musculoskeletal: Positive for arthralgias and back pain  Negative for myalgias, neck pain and neck stiffness  Neurological: Negative for numbness  Objective:      /68   Pulse 80   Temp 99 °F (37 2 °C) (Tympanic)   Ht 5' 8" (1 727 m)   Wt 76 8 kg (169 lb 6 4 oz)   SpO2 96%   BMI 25 76 kg/m²          Physical Exam   Constitutional: He is oriented to person, place, and time  He appears well-developed and well-nourished  No distress  Pulmonary/Chest: Effort normal and breath sounds normal    Musculoskeletal: Normal range of motion  He exhibits no edema, tenderness or deformity  Neurological: He is alert and oriented to person, place, and time  Skin: No rash noted  He is not diaphoretic  No erythema  No pallor  Slight tenderness noted base of 2 mid toes, left foot, no swelling or erythema noted  Nursing note and vitals reviewed

## 2019-09-13 DIAGNOSIS — E78.00 HYPERCHOLESTEROLEMIA: ICD-10-CM

## 2019-09-13 RX ORDER — ATORVASTATIN CALCIUM 40 MG/1
40 TABLET, FILM COATED ORAL DAILY
Qty: 90 TABLET | Refills: 3 | Status: SHIPPED | OUTPATIENT
Start: 2019-09-13 | End: 2020-10-02

## 2019-12-02 DIAGNOSIS — I10 ESSENTIAL HYPERTENSION: ICD-10-CM

## 2019-12-02 RX ORDER — METOPROLOL SUCCINATE 50 MG/1
TABLET, EXTENDED RELEASE ORAL
Qty: 30 TABLET | Refills: 11 | Status: SHIPPED | OUTPATIENT
Start: 2019-12-02 | End: 2020-12-10 | Stop reason: SDUPTHER

## 2019-12-17 ENCOUNTER — OFFICE VISIT (OUTPATIENT)
Dept: FAMILY MEDICINE CLINIC | Facility: HOSPITAL | Age: 44
End: 2019-12-17
Payer: COMMERCIAL

## 2019-12-17 VITALS
SYSTOLIC BLOOD PRESSURE: 120 MMHG | HEIGHT: 68 IN | TEMPERATURE: 99 F | DIASTOLIC BLOOD PRESSURE: 78 MMHG | HEART RATE: 77 BPM | WEIGHT: 175.4 LBS | BODY MASS INDEX: 26.58 KG/M2
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
## 2019-12-17 PROCEDURE — 3008F BODY MASS INDEX DOCD: CPT | Performed by: INTERNAL MEDICINE

## 2019-12-17 PROCEDURE — 1036F TOBACCO NON-USER: CPT | Performed by: INTERNAL MEDICINE

## 2019-12-17 PROCEDURE — 99213 OFFICE O/P EST LOW 20 MIN: CPT | Performed by: INTERNAL MEDICINE

## 2019-12-17 RX ORDER — LEVOFLOXACIN 750 MG/1
750 TABLET ORAL EVERY 24 HOURS
Qty: 7 TABLET | Refills: 0 | Status: SHIPPED | OUTPATIENT
Start: 2019-12-17 | End: 2019-12-24 | Stop reason: SDUPTHER

## 2019-12-17 NOTE — PROGRESS NOTES
Assessment/Plan:       Diagnoses and all orders for this visit:    Cat bite, initial encounter  -     levofloxacin (LEVAQUIN) 750 mg tablet; Take 1 tablet (750 mg total) by mouth every 24 hours for 7 days          All of the above diagnoses have been assessed  Additional COMMENTS/PLAN:  Patient is penicillin allergic will place on Levaquin  Patient was instructed that if this gets worse specifically swelling of the palmar part of his hand with concern compartment syndrome or worsening lymphangitic streak up his arm to his armpit he is to present directly to the emergency room  Subjective:      Patient ID: Kofi Hawkins is a 53 y o  male  HPI   Had a cat bite on Saturday  Started with swelling 36 hours ago  Sightly worse  The following portions of the patient's history were revised and updated as appropriate: Problem list, allergies, med list, FH, SH, Past medical and surgical histories      Current Outpatient Medications   Medication Sig Dispense Refill    aspirin (ECOTRIN LOW STRENGTH) 81 mg EC tablet Take 81 mg by mouth daily      atorvastatin (LIPITOR) 40 mg tablet Take 1 tablet (40 mg total) by mouth daily 90 tablet 3    Cholecalciferol (VITAMIN D3) 2000 units TABS Take 1 tablet by mouth daily      dorzolamide (TRUSOPT) 2 % ophthalmic solution Administer 1 drop into the left eye daily      fluorometholone (FML) 0 1 % ophthalmic suspension Administer 1 drop into the left eye daily      lisinopril (ZESTRIL) 20 mg tablet TAKE 1 TABLET BY MOUTH ONCE DAILY 30 tablet 11    meloxicam (MOBIC) 7 5 mg tablet Take 1 tablet (7 5 mg total) by mouth 2 (two) times a day as needed for mild pain or moderate pain 60 tablet 2    metoprolol succinate (TOPROL-XL) 50 mg 24 hr tablet TAKE 1 TABLET BY MOUTH ONCE DAILY 30 tablet 11    nitroglycerin (NITROSTAT) 0 4 mg SL tablet Place 1 tablet (0 4 mg total) under the tongue every 5 (five) minutes as needed for chest pain 90 tablet 0    levofloxacin (LEVAQUIN) 750 mg tablet Take 1 tablet (750 mg total) by mouth every 24 hours for 7 days 7 tablet 0     No current facility-administered medications for this visit  Review of Systems      Objective:    /78 (BP Location: Left arm, Patient Position: Sitting, Cuff Size: Standard)   Pulse 77   Temp 99 °F (37 2 °C) (Tympanic)   Ht 5' 8" (1 727 m)   Wt 79 6 kg (175 lb 6 4 oz)   BMI 26 67 kg/m²     BP Readings from Last 3 Encounters:   12/17/19 120/78   08/01/19 100/68   05/21/18 120/70                  Wt Readings from Last 3 Encounters:   12/17/19 79 6 kg (175 lb 6 4 oz)   08/01/19 76 8 kg (169 lb 6 4 oz)   05/21/18 77 1 kg (170 lb)         Physical Exam   Musculoskeletal:   Examination of the right hand reveals erythema at the base of the 2nd finger  This goes up the volar surface of the hand into the arm with a streak extending to the elbow  There is no evidence compartment syndrome  There is good strength of the distal fingers  No visits with results within 2 Week(s) from this visit     Latest known visit with results is:   Office Visit on 05/21/2018   Component Date Value Ref Range Status    Glucose, Random 09/07/2018 99  65 - 99 mg/dL Final    BUN 09/07/2018 15  6 - 24 mg/dL Final    Creatinine 09/07/2018 1 05  0 76 - 1 27 mg/dL Final    eGFR Non  09/07/2018 85  >59 mL/min/1 73 Final    eGFR African American 09/07/2018 99  >59 mL/min/1 73 Final    SL AMB BUN/CREATININE RATIO 09/07/2018 14  9 - 20 Final    Sodium 09/07/2018 138  134 - 144 mmol/L Final    Potassium 09/07/2018 5 1  3 5 - 5 2 mmol/L Final    Chloride 09/07/2018 99  96 - 106 mmol/L Final    CO2 09/07/2018 25  20 - 29 mmol/L Final    CALCIUM 09/07/2018 9 7  8 7 - 10 2 mg/dL Final    Protein, Total 09/07/2018 6 9  6 0 - 8 5 g/dL Final    Albumin 09/07/2018 4 7  3 5 - 5 5 g/dL Final    TOTAL BILIRUBIN 09/07/2018 0 7  0 0 - 1 2 mg/dL Final    Bilirubin, Direct 09/07/2018 0 15  0 00 - 0 40 mg/dL Final  Alk Phos Isoenzymes 09/07/2018 61  39 - 117 IU/L Final    AST 09/07/2018 37  0 - 40 IU/L Final    ALT 09/07/2018 41  0 - 44 IU/L Final    Cholesterol, Total 09/07/2018 123  100 - 199 mg/dL Final    Triglycerides 09/07/2018 72  0 - 149 mg/dL Final    HDL 09/07/2018 41  >39 mg/dL Final    VLDL Cholesterol Calculated 09/07/2018 14  5 - 40 mg/dL Final    LDL Direct 09/07/2018 68  0 - 99 mg/dL Final    T  Chol/HDL Ratio 09/07/2018 3 0  0 0 - 5 0 ratio Final    Comment:                                   T  Chol/HDL Ratio                                              Men  Women                                1/2 Avg  Risk  3 4    3 3                                    Avg Risk  5 0    4 4                                 2X Avg  Risk  9 6    7 1                                 3X Avg  Risk 23 4   11 0           Rajani Servin MD

## 2019-12-23 ENCOUNTER — TELEPHONE (OUTPATIENT)
Dept: FAMILY MEDICINE CLINIC | Facility: HOSPITAL | Age: 44
End: 2019-12-23
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
## 2019-12-23 RX ORDER — LEVOFLOXACIN 750 MG/1
750 TABLET ORAL EVERY 24 HOURS
Qty: 5 TABLET | Refills: 0 | Status: CANCELLED | OUTPATIENT
Start: 2019-12-23 | End: 2019-12-28
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
## 2019-12-24 RX ORDER — LEVOFLOXACIN 750 MG/1
750 TABLET ORAL EVERY 24 HOURS
Qty: 5 TABLET | Refills: 0 | Status: SHIPPED | OUTPATIENT
Start: 2019-12-24 | End: 2019-12-29

## 2019-12-30 ENCOUNTER — TELEPHONE (OUTPATIENT)
Dept: FAMILY MEDICINE CLINIC | Facility: HOSPITAL | Age: 44
End: 2019-12-30

## 2019-12-30 NOTE — TELEPHONE ENCOUNTER
Had levaquin ordered by Dr Fleming Peaks- ended yesterday  Still with discomfort,but no redness and swelling is gradually improving    If any worsening of symptoms he should call and we Desire Valdez him seen by ortho pedics

## 2020-01-18 ENCOUNTER — APPOINTMENT (EMERGENCY)
Dept: RADIOLOGY | Facility: HOSPITAL | Age: 45
End: 2020-01-18
Payer: COMMERCIAL

## 2020-01-18 ENCOUNTER — HOSPITAL ENCOUNTER (EMERGENCY)
Facility: HOSPITAL | Age: 45
Discharge: HOME | End: 2020-01-18
Attending: EMERGENCY MEDICINE
Payer: COMMERCIAL

## 2020-01-18 VITALS
OXYGEN SATURATION: 96 % | BODY MASS INDEX: 25.39 KG/M2 | SYSTOLIC BLOOD PRESSURE: 111 MMHG | WEIGHT: 167 LBS | TEMPERATURE: 98 F | RESPIRATION RATE: 16 BRPM | DIASTOLIC BLOOD PRESSURE: 59 MMHG | HEART RATE: 74 BPM

## 2020-01-18 DIAGNOSIS — R07.9 CHEST PAIN: Primary | ICD-10-CM

## 2020-01-18 LAB
ALBUMIN SERPL BCP-MCNC: 4.1 G/DL (ref 3.5–5)
ALP SERPL-CCNC: 57 U/L (ref 46–116)
ALT SERPL W P-5'-P-CCNC: 36 U/L (ref 12–78)
ANION GAP SERPL CALCULATED.3IONS-SCNC: 8 MMOL/L (ref 4–13)
APTT PPP: 26 SECONDS (ref 23–37)
AST SERPL W P-5'-P-CCNC: 25 U/L (ref 5–45)
BASOPHILS # BLD AUTO: 0.02 THOUSANDS/ΜL (ref 0–0.1)
BASOPHILS NFR BLD AUTO: 0 % (ref 0–1)
BILIRUB SERPL-MCNC: 0.6 MG/DL (ref 0.2–1)
BUN SERPL-MCNC: 17 MG/DL (ref 5–25)
CALCIUM SERPL-MCNC: 9.1 MG/DL (ref 8.3–10.1)
CHLORIDE SERPL-SCNC: 106 MMOL/L (ref 100–108)
CO2 SERPL-SCNC: 30 MMOL/L (ref 21–32)
CREAT SERPL-MCNC: 1.08 MG/DL (ref 0.6–1.3)
EOSINOPHIL # BLD AUTO: 0.1 THOUSAND/ΜL (ref 0–0.61)
EOSINOPHIL NFR BLD AUTO: 1 % (ref 0–6)
ERYTHROCYTE [DISTWIDTH] IN BLOOD BY AUTOMATED COUNT: 11.8 % (ref 11.6–15.1)
GFR SERPL CREATININE-BSD FRML MDRD: 82 ML/MIN/1.73SQ M
GLUCOSE SERPL-MCNC: 98 MG/DL (ref 65–140)
HCT VFR BLD AUTO: 46 % (ref 36.5–49.3)
HGB BLD-MCNC: 15.2 G/DL (ref 12–17)
IMM GRANULOCYTES # BLD AUTO: 0.02 THOUSAND/UL (ref 0–0.2)
IMM GRANULOCYTES NFR BLD AUTO: 0 % (ref 0–2)
INR PPP: 1 (ref 0.84–1.19)
LYMPHOCYTES # BLD AUTO: 3.23 THOUSANDS/ΜL (ref 0.6–4.47)
LYMPHOCYTES NFR BLD AUTO: 45 % (ref 14–44)
MCH RBC QN AUTO: 29.7 PG (ref 26.8–34.3)
MCHC RBC AUTO-ENTMCNC: 33 G/DL (ref 31.4–37.4)
MCV RBC AUTO: 90 FL (ref 82–98)
MONOCYTES # BLD AUTO: 0.53 THOUSAND/ΜL (ref 0.17–1.22)
MONOCYTES NFR BLD AUTO: 7 % (ref 4–12)
NEUTROPHILS # BLD AUTO: 3.24 THOUSANDS/ΜL (ref 1.85–7.62)
NEUTS SEG NFR BLD AUTO: 47 % (ref 43–75)
NRBC BLD AUTO-RTO: 0 /100 WBCS
PLATELET # BLD AUTO: 233 THOUSANDS/UL (ref 149–390)
PMV BLD AUTO: 9.5 FL (ref 8.9–12.7)
POTASSIUM SERPL-SCNC: 4 MMOL/L (ref 3.5–5.3)
PROT SERPL-MCNC: 7 G/DL (ref 6.4–8.2)
PROTHROMBIN TIME: 12.9 SECONDS (ref 11.6–14.5)
RBC # BLD AUTO: 5.12 MILLION/UL (ref 3.88–5.62)
SODIUM SERPL-SCNC: 144 MMOL/L (ref 136–145)
TROPONIN I SERPL-MCNC: <0.02 NG/ML
TROPONIN I SERPL-MCNC: <0.02 NG/ML
WBC # BLD AUTO: 7.14 THOUSAND/UL (ref 4.31–10.16)

## 2020-01-18 PROCEDURE — 85025 COMPLETE CBC W/AUTO DIFF WBC: CPT | Performed by: EMERGENCY MEDICINE

## 2020-01-18 PROCEDURE — 84484 ASSAY OF TROPONIN QUANT: CPT | Performed by: EMERGENCY MEDICINE

## 2020-01-18 PROCEDURE — 85730 THROMBOPLASTIN TIME PARTIAL: CPT | Performed by: EMERGENCY MEDICINE

## 2020-01-18 PROCEDURE — 80053 COMPREHEN METABOLIC PANEL: CPT | Performed by: EMERGENCY MEDICINE

## 2020-01-18 PROCEDURE — 36415 COLL VENOUS BLD VENIPUNCTURE: CPT | Performed by: EMERGENCY MEDICINE

## 2020-01-18 PROCEDURE — 71046 X-RAY EXAM CHEST 2 VIEWS: CPT

## 2020-01-18 PROCEDURE — 93005 ELECTROCARDIOGRAM TRACING: CPT

## 2020-01-18 PROCEDURE — 85610 PROTHROMBIN TIME: CPT | Performed by: EMERGENCY MEDICINE

## 2020-01-18 PROCEDURE — 99285 EMERGENCY DEPT VISIT HI MDM: CPT | Performed by: EMERGENCY MEDICINE

## 2020-01-18 PROCEDURE — 99285 EMERGENCY DEPT VISIT HI MDM: CPT

## 2020-01-18 NOTE — ED PROVIDER NOTES
History  Chief Complaint   Patient presents with    Chest Pain     c/o intermittent chest pain x 1 week, pain free since this AM   C/o body "tingling" earlier today  This is a 77-year-old male presents via ambulance from home for evaluation of sharp substernal chest pain that has been on and off for 1 week today he had some generalized body tingling and nausea he does have a cardiac history with stent placement he is a former smoker is currently under treatment for hypertension and hypercholesterolemia denies any diabetes no prior history of DVT      History provided by:  Patient  Medical Problem   Location:  Substernal  Quality:  Sharp pain  Severity:  Moderate  Onset quality:  Gradual  Duration:  1 week  Timing:  Intermittent  Chronicity:  New  Context:  Substernal chest pain that comes at rest and sometimes with exertion no pain at all at this time  Associated symptoms: chest pain and nausea        Prior to Admission Medications   Prescriptions Last Dose Informant Patient Reported? Taking?    Cholecalciferol (VITAMIN D3) 2000 units TABS  Self Yes No   Sig: Take 1 tablet by mouth daily   aspirin (ECOTRIN LOW STRENGTH) 81 mg EC tablet  Self Yes No   Sig: Take 81 mg by mouth daily   atorvastatin (LIPITOR) 40 mg tablet  Self No No   Sig: Take 1 tablet (40 mg total) by mouth daily   dorzolamide (TRUSOPT) 2 % ophthalmic solution  Self Yes No   Sig: Administer 1 drop into the left eye daily   fluorometholone (FML) 0 1 % ophthalmic suspension  Self Yes No   Sig: Administer 1 drop into the left eye daily   lisinopril (ZESTRIL) 20 mg tablet  Self No No   Sig: TAKE 1 TABLET BY MOUTH ONCE DAILY   meloxicam (MOBIC) 7 5 mg tablet  Self No No   Sig: Take 1 tablet (7 5 mg total) by mouth 2 (two) times a day as needed for mild pain or moderate pain   metoprolol succinate (TOPROL-XL) 50 mg 24 hr tablet  Self No No   Sig: TAKE 1 TABLET BY MOUTH ONCE DAILY   nitroglycerin (NITROSTAT) 0 4 mg SL tablet  Self No No   Sig: Place 1 tablet (0 4 mg total) under the tongue every 5 (five) minutes as needed for chest pain      Facility-Administered Medications: None       Past Medical History:   Diagnosis Date    Anterolateral myocardial infarction [facility])     last assessed 10/30/17    Chest pain     Glaucoma     Heart disease     Hypercholesterolemia     Hypertension     Uveitis     Visual impairment        Past Surgical History:   Procedure Laterality Date    APPENDECTOMY      CARDIAC CATHETERIZATION      CARDIAC SURGERY      CORONARY STENT PLACEMENT      EYE SURGERY      TONSILLECTOMY         Family History   Problem Relation Age of Onset    Blindness Mother         retinitis pigmentosis    Heart attack Father         1st heart attack at age 28- had CABG in past    Diabetes Father     Blindness Maternal Aunt         retinitis pigmentosis    Dislocations Family     Heart disease Family     Hypertension Family     Mental illness Neg Hx     Substance Abuse Neg Hx      I have reviewed and agree with the history as documented  Social History     Tobacco Use    Smoking status: Former Smoker    Smokeless tobacco: Never Used   Substance Use Topics    Alcohol use: No    Drug use: Yes     Types: Marijuana        Review of Systems   Cardiovascular: Positive for chest pain  Gastrointestinal: Positive for nausea  Neurological:        Total body tingling   All other systems reviewed and are negative  Physical Exam  Physical Exam   Constitutional: He is oriented to person, place, and time  He appears well-developed and well-nourished  No distress  HENT:   Head: Normocephalic and atraumatic  Right Ear: External ear normal    Left Ear: External ear normal    Nose: Nose normal    Mouth/Throat: Oropharynx is clear and moist    Eyes: EOM are normal  Right eye exhibits no discharge  Left eye exhibits no discharge  No scleral icterus     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   Neck: Neck supple  No JVD present  No tracheal deviation present  Cardiovascular: Normal rate, regular rhythm and intact distal pulses  Exam reveals no gallop and no friction rub  No murmur heard  Pulmonary/Chest: Effort normal and breath sounds normal  No stridor  No respiratory distress  He has no wheezes  He has no rales  Abdominal: Soft  Bowel sounds are normal  He exhibits no distension  There is no tenderness  There is no guarding  Musculoskeletal: Normal range of motion  He exhibits no edema, tenderness or deformity  Neurological: He is alert and oriented to person, place, and time  No cranial nerve deficit or sensory deficit  He exhibits normal muscle tone  Coordination normal    Skin: Skin is warm and dry  No rash noted  He is not diaphoretic  Psychiatric: He has a normal mood and affect  His behavior is normal  Thought content normal    Nursing note and vitals reviewed        Vital Signs  ED Triage Vitals [01/18/20 1823]   Temperature Pulse Respirations Blood Pressure SpO2   98 °F (36 7 °C) 79 18 126/75 97 %      Temp src Heart Rate Source Patient Position - Orthostatic VS BP Location FiO2 (%)   -- Monitor Lying Right arm --      Pain Score       No Pain           Vitals:    01/18/20 1823 01/18/20 2000   BP: 126/75 127/76   Pulse: 79 60   Patient Position - Orthostatic VS: Lying Lying         Visual Acuity      ED Medications  Medications - No data to display    Diagnostic Studies  Results Reviewed     Procedure Component Value Units Date/Time    Troponin I [[id number]]  (Normal) Collected:  01/18/20 2125    Lab Status:  Final result Specimen:  Blood from Arm, Left Updated:  01/18/20 2153     Troponin I <0 02 ng/mL     Troponin I [[id number]]  (Normal) Collected:  01/18/20 1826    Lab Status:  Final result Specimen:  Blood from Arm, Left Updated:  01/18/20 1853     Troponin I <0 02 ng/mL     Comprehensive metabolic panel [[id number]] Collected:  01/18/20 1826    Lab Status:  Final result Specimen:  Blood from Arm, Left Updated:  01/18/20 1851     Sodium 144 mmol/L      Potassium 4 0 mmol/L      Chloride 106 mmol/L      CO2 30 mmol/L      ANION GAP 8 mmol/L      BUN 17 mg/dL      Creatinine 1 08 mg/dL      Glucose 98 mg/dL      Calcium 9 1 mg/dL      AST 25 U/L      ALT 36 U/L      Alkaline Phosphatase 57 U/L      Total Protein 7 0 g/dL      Albumin 4 1 g/dL      Total Bilirubin 0 60 mg/dL      eGFR 82 ml/min/1 73sq m     Narrative:       National Kidney Disease Foundation guidelines for Chronic Kidney Disease (CKD):     Stage 1 with normal or high GFR (GFR > 90 mL/min/1 73 square meters)    Stage 2 Mild CKD (GFR = 60-89 mL/min/1 73 square meters)    Stage 3A Moderate CKD (GFR = 45-59 mL/min/1 73 square meters)    Stage 3B Moderate CKD (GFR = 30-44 mL/min/1 73 square meters)    Stage 4 Severe CKD (GFR = 15-29 mL/min/1 73 square meters)    Stage 5 End Stage CKD (GFR <15 mL/min/1 73 square meters)  Note: GFR calculation is accurate only with a steady state creatinine    Protime-INR [[id number]]  (Normal) Collected:  01/18/20 1826    Lab Status:  Final result Specimen:  Blood from Arm, Left Updated:  01/18/20 1846     Protime 12 9 seconds      INR 1 00    APTT [[id number]]  (Normal) Collected:  01/18/20 1826    Lab Status:  Final result Specimen:  Blood from Arm, Left Updated:  01/18/20 1846     PTT 26 seconds     CBC and differential [[id number]]  (Abnormal) Collected:  01/18/20 1826    Lab Status:  Final result Specimen:  Blood from Arm, Left Updated:  01/18/20 1832     WBC 7 14 Thousand/uL      RBC 5 12 Million/uL      Hemoglobin 15 2 g/dL      Hematocrit 46 0 %      MCV 90 fL      MCH 29 7 pg      MCHC 33 0 g/dL      RDW 11 8 %      MPV 9 5 fL      Platelets 683 Thousands/uL      nRBC 0 /100 WBCs      Neutrophils Relative 47 %      Immat GRANS % 0 %      Lymphocytes Relative 45 %      Monocytes Relative 7 %      Eosinophils Relative 1 %      Basophils Relative 0 %      Neutrophils Absolute 3 24 Thousands/µL      Immature Grans Absolute 0 02 Thousand/uL      Lymphocytes Absolute 3 23 Thousands/µL      Monocytes Absolute 0 53 Thousand/µL      Eosinophils Absolute 0 10 Thousand/µL      Basophils Absolute 0 02 Thousands/µL                  XR chest 2 views   ED Interpretation by Sreedhar Carbajal DO (01/18 1857)   No acute infiltrate no pneumothorax or congestive heart failure                 Procedures  ECG 12 Lead Documentation Only  Date/Time: 1/18/2020 6:26 PM  Performed by: Sreedhar Carbajal DO  Authorized by: Sreedhar Carbajal DO     ECG reviewed by me, the ED Provider: yes    Patient location:  ED  Rate:     ECG rate:  81  Rhythm:     Rhythm: sinus rhythm    Conduction:     Conduction: normal    T waves:     T waves: normal               ED Course  ED Course as of Jan 18 2200   Sat Jan 18, 2020 2153 Repeat troponin is unchanged will discharge for outpatient stress test and follow up with Cardiology            HEART Risk Score      Most Recent Value   History  0 Filed at: 01/18/2020 1858   ECG  0 Filed at: 01/18/2020 1858   Age  1 Filed at: 01/18/2020 1858   Risk Factors  2 Filed at: 01/18/2020 1858   Troponin  0 Filed at: 01/18/2020 1858   Heart Score Risk Calculator   History  0 Filed at: 01/18/2020 1858   ECG  0 Filed at: 01/18/2020 1858   Age  1 Filed at: 01/18/2020 1858   Risk Factors  2 Filed at: 01/18/2020 1858   Troponin  0 Filed at: 01/18/2020 1858   HEART Score  3 Filed at: 01/18/2020 1858   HEART Score  3 Filed at: 01/18/2020 [address] for PE      Most Recent Value   PERC Rule for PE   Age >=50  0 Filed at: 01/18/2020 1831   HR >=100  0 Filed at: 01/18/2020 1831   O2 Sat on room air < 95%  0 Filed at: 01/18/2020 1831   History of PE or DVT  0 Filed at: 01/18/2020 1831   Recent trauma or surgery  0 Filed at: 01/18/2020 1831   Hemoptysis  0 Filed at: 01/18/2020 1831   Exogenous estrogen  0 Filed at: 01/18/2020 1831   Unilateral leg swelling  0 Filed at: 01/18/2020 1831   PERC Rule for PE Results  0 Filed at: 01/18/2020 1831                Wells' Criteria for PE      Most Recent Value   Wells' Criteria for PE   Clinical signs and symptoms of DVT  0 Filed at: 01/18/2020 1831   PE is primary diagnosis or equally likely  0 Filed at: 01/18/2020 1831   HR >100  0 Filed at: 01/18/2020 1831   Immobilization at least 3 days or Surgery in the previous 4 weeks  0 Filed at: 01/18/2020 1831   Previous, objectively diagnosed PE or DVT  0 Filed at: 01/18/2020 1831   Hemoptysis  0 Filed at: 01/18/2020 1831   Malignancy with treatment within 6 months or palliative  0 Filed at: 01/18/2020 1831   Wells' Criteria Total  0 Filed at: 01/18/2020 1831            MDM  Number of Diagnoses or Management Options  Diagnosis management comments: Chest pain differential includes acute coronary syndrome low clinical suspicion for pulmonary embolism also possibly musculoskeletal soft GI DIS will check EKG chest x-ray and labs for further evaluation       Amount and/or Complexity of Data Reviewed  Clinical lab tests: ordered  Tests in the radiology section of CPT®: ordered          Disposition  Final diagnoses:   Chest pain     Time reflects when diagnosis was documented in both MDM as applicable and the Disposition within this note     Time User Action Codes Description Comment    1/18/2020  9:54 PM Eliu Nieves Add [R07 9] Chest pain       ED Disposition     ED Disposition Condition Date/Time Comment    Discharge Stable Sat Jan 18, 2020  9:54 PM Arias Alvarez discharge to home/self care  Follow-up Information     Follow up With Specialties Details Why Tj Vicente MD Cardiology In 1 week Follow-up [address] [id number]  [address] [facility]  [phone]            Patient's Medications   Discharge Prescriptions    No medications on file     Outpatient Discharge Orders   NM myocardial perfusion spect (rx stress and/or rest)   Standing Status: Future Standing Exp   Date: 02/18/20       ED Provider  Electronically Signed by           Janice Hanna,   01/18/20 9985

## 2020-01-19 LAB
ATRIAL RATE: 67 BPM
P AXIS: 58 DEGREES
PR INTERVAL: 138 MS
QRS AXIS: 93 DEGREES
QRSD INTERVAL: 106 MS
QT INTERVAL: 374 MS
QTC INTERVAL: 395 MS
T WAVE AXIS: 66 DEGREES
VENTRICULAR RATE: 67 BPM

## 2020-01-19 PROCEDURE — 93010 ELECTROCARDIOGRAM REPORT: CPT | Performed by: INTERNAL MEDICINE

## 2020-01-19 NOTE — DISCHARGE INSTRUCTIONS
Have outpatient stress test as prescription given to you and follow-up with your cardiologist next week return to the ER if your symptoms worsen before that time

## 2020-01-20 ENCOUNTER — OFFICE VISIT (OUTPATIENT)
Dept: FAMILY MEDICINE CLINIC | Facility: HOSPITAL | Age: 45
End: 2020-01-20
Payer: COMMERCIAL

## 2020-01-20 VITALS
TEMPERATURE: 97.7 F | WEIGHT: 176.8 LBS | HEIGHT: 68 IN | BODY MASS INDEX: 26.8 KG/M2 | DIASTOLIC BLOOD PRESSURE: 70 MMHG | SYSTOLIC BLOOD PRESSURE: 110 MMHG | HEART RATE: 60 BPM

## 2020-01-20 DIAGNOSIS — R07.9 CHEST PAIN, UNSPECIFIED TYPE: ICD-10-CM

## 2020-01-20 DIAGNOSIS — I10 ESSENTIAL HYPERTENSION: Primary | ICD-10-CM

## 2020-01-20 DIAGNOSIS — H65.02 NON-RECURRENT ACUTE SEROUS OTITIS MEDIA OF LEFT EAR: ICD-10-CM

## 2020-01-20 DIAGNOSIS — I25.2 OLD MYOCARDIAL INFARCTION: ICD-10-CM

## 2020-01-20 PROBLEM — J01.80 OTHER ACUTE SINUSITIS: Status: RESOLVED | Noted: 2018-02-05 | Resolved: 2020-01-20

## 2020-01-20 LAB
ATRIAL RATE: 81 BPM
P AXIS: 43 DEGREES
PR INTERVAL: 138 MS
QRS AXIS: 92 DEGREES
QRSD INTERVAL: 94 MS
QT INTERVAL: 340 MS
QTC INTERVAL: 394 MS
T WAVE AXIS: 58 DEGREES
VENTRICULAR RATE: 81 BPM

## 2020-01-20 PROCEDURE — 99214 OFFICE O/P EST MOD 30 MIN: CPT | Performed by: INTERNAL MEDICINE

## 2020-01-20 PROCEDURE — 1036F TOBACCO NON-USER: CPT | Performed by: INTERNAL MEDICINE

## 2020-01-20 PROCEDURE — 3078F DIAST BP <80 MM HG: CPT | Performed by: INTERNAL MEDICINE

## 2020-01-20 PROCEDURE — 3074F SYST BP LT 130 MM HG: CPT | Performed by: INTERNAL MEDICINE

## 2020-01-20 PROCEDURE — 3008F BODY MASS INDEX DOCD: CPT | Performed by: INTERNAL MEDICINE

## 2020-01-20 PROCEDURE — 93010 ELECTROCARDIOGRAM REPORT: CPT | Performed by: INTERNAL MEDICINE

## 2020-01-20 RX ORDER — SULFAMETHOXAZOLE AND TRIMETHOPRIM 800; 160 MG/1; MG/1
1 TABLET ORAL EVERY 12 HOURS SCHEDULED
Qty: 14 TABLET | Refills: 0 | Status: SHIPPED | OUTPATIENT
Start: 2020-01-20 | End: 2020-01-27

## 2020-01-20 NOTE — PROGRESS NOTES
Assessment/Plan:       Diagnoses and all orders for this visit:    Essential hypertension    Old myocardial infarction    Chest pain, unspecified type    Non-recurrent acute serous otitis media of left ear  -     sulfamethoxazole-trimethoprim (BACTRIM DS) 800-160 mg per tablet; Take 1 tablet by mouth every 12 (twelve) hours for 7 days          All of the above diagnoses have been assessed  Additional COMMENTS/PLAN: needs to have the stress test ordered by ER  I will be does here for the cardiologist will intervene  Subjective:      Patient ID: Sandi Gallo is a 53 y o  male  HPI     Patient was seen in the emergency room 2 days ago with chest pain  Had evaluation which showed negative troponins and no EKG changes  He was told to make an appointment with his cardiologist and have a stress test  Did do vigorous activity the day before  Complains of left ear pain at this time  Does have some dizziness  The following portions of the patient's history were revised and updated as appropriate: Problem list, allergies, med list, FH, SH, Past medical and surgical histories      Current Outpatient Medications   Medication Sig Dispense Refill    aspirin (ECOTRIN LOW STRENGTH) 81 mg EC tablet Take 81 mg by mouth daily      atorvastatin (LIPITOR) 40 mg tablet Take 1 tablet (40 mg total) by mouth daily 90 tablet 3    Cholecalciferol (VITAMIN D3) 2000 units TABS Take 1 tablet by mouth daily      dorzolamide (TRUSOPT) 2 % ophthalmic solution Administer 1 drop into the left eye daily      fluorometholone (FML) 0 1 % ophthalmic suspension Administer 1 drop into the left eye daily      lisinopril (ZESTRIL) 20 mg tablet TAKE 1 TABLET BY MOUTH ONCE DAILY 30 tablet 11    metoprolol succinate (TOPROL-XL) 50 mg 24 hr tablet TAKE 1 TABLET BY MOUTH ONCE DAILY 30 tablet 11    nitroglycerin (NITROSTAT) 0 4 mg SL tablet Place 1 tablet (0 4 mg total) under the tongue every 5 (five) minutes as needed for chest pain 90 tablet 0    sulfamethoxazole-trimethoprim (BACTRIM DS) 800-160 mg per tablet Take 1 tablet by mouth every 12 (twelve) hours for 7 days 14 tablet 0     No current facility-administered medications for this visit  Review of Systems      Objective:    /70 (BP Location: Left arm, Patient Position: Sitting, Cuff Size: Standard)   Pulse 60   Temp 97 7 °F (36 5 °C) (Tympanic)   Ht 5' 8" (1 727 m)   Wt 80 2 kg (176 lb 12 8 oz)   BMI 26 88 kg/m²     BP Readings from Last 3 Encounters:   01/20/20 110/70   01/18/20 111/59   12/17/19 120/78                  Wt Readings from Last 3 Encounters:   01/20/20 80 2 kg (176 lb 12 8 oz)   01/18/20 75 8 kg (167 lb)   12/17/19 79 6 kg (175 lb 6 4 oz)         Physical Exam   Constitutional: He appears well-developed  HENT:   Left otitis media   Cardiovascular: Normal rate and regular rhythm  No CW tenderness   Pulmonary/Chest: Effort normal and breath sounds normal    Vitals reviewed          Admission on 01/18/2020, Discharged on 01/18/2020   Component Date Value Ref Range Status    WBC 01/18/2020 7 14  4 31 - 10 16 Thousand/uL Final    RBC 01/18/2020 5 12  3 88 - 5 62 Million/uL Final    Hemoglobin 01/18/2020 15 2  12 0 - 17 0 g/dL Final    Hematocrit 01/18/2020 46 0  36 5 - 49 3 % Final    MCV 01/18/2020 90  82 - 98 fL Final    MCH 01/18/2020 29 7  26 8 - 34 3 pg Final    MCHC 01/18/2020 33 0  31 4 - 37 4 g/dL Final    RDW 01/18/2020 11 8  11 6 - 15 1 % Final    MPV 01/18/2020 9 5  8 9 - 12 7 fL Final    Platelets 34/94/4855 233  149 - 390 Thousands/uL Final    nRBC 01/18/2020 0  /100 WBCs Final    Neutrophils Relative 01/18/2020 47  43 - 75 % Final    Immat GRANS % 01/18/2020 0  0 - 2 % Final    Lymphocytes Relative 01/18/2020 45* 14 - 44 % Final    Monocytes Relative 01/18/2020 7  4 - 12 % Final    Eosinophils Relative 01/18/2020 1  0 - 6 % Final    Basophils Relative 01/18/2020 0  0 - 1 % Final    Neutrophils Absolute 01/18/2020 3 24 1 85 - 7 62 Thousands/µL Final    Immature Grans Absolute 01/18/2020 0 02  0 00 - 0 20 Thousand/uL Final    Lymphocytes Absolute 01/18/2020 3 23  0 60 - 4 47 Thousands/µL Final    Monocytes Absolute 01/18/2020 0 53  0 17 - 1 22 Thousand/µL Final    Eosinophils Absolute 01/18/2020 0 10  0 00 - 0 61 Thousand/µL Final    Basophils Absolute 01/18/2020 0 02  0 00 - 0 10 Thousands/µL Final    Sodium 01/18/2020 144  136 - 145 mmol/L Final    Potassium 01/18/2020 4 0  3 5 - 5 3 mmol/L Final    Chloride 01/18/2020 106  100 - 108 mmol/L Final    CO2 01/18/2020 30  21 - 32 mmol/L Final    ANION GAP 01/18/2020 8  4 - 13 mmol/L Final    BUN 01/18/2020 17  5 - 25 mg/dL Final    Creatinine 01/18/2020 1 08  0 60 - 1 30 mg/dL Final    Standardized to IDMS reference method    Glucose 01/18/2020 98  65 - 140 mg/dL Final      If the patient is fasting, the ADA then defines impaired fasting glucose as > 100 mg/dL and diabetes as > or equal to 123 mg/dL  Specimen collection should occur prior to Sulfasalazine administration due to the potential for falsely depressed results  Specimen collection should occur prior to Sulfapyridine administration due to the potential for falsely elevated results   Calcium 01/18/2020 9 1  8 3 - 10 1 mg/dL Final    AST 01/18/2020 25  5 - 45 U/L Final      Specimen collection should occur prior to Sulfasalazine administration due to the potential for falsely depressed results   ALT 01/18/2020 36  12 - 78 U/L Final      Specimen collection should occur prior to Sulfasalazine administration due to the potential for falsely depressed results       Alkaline Phosphatase 01/18/2020 57  46 - 116 U/L Final    Total Protein 01/18/2020 7 0  6 4 - 8 2 g/dL Final    Albumin 01/18/2020 4 1  3 5 - 5 0 g/dL Final    Total Bilirubin 01/18/2020 0 60  0 20 - 1 00 mg/dL Final    eGFR 01/18/2020 82  ml/min/1 73sq m Final    Protime 01/18/2020 12 9  11 6 - 14 5 seconds Final    INR 01/18/2020 1 00 0 84 - 1 19 Final    PTT 01/18/2020 26  23 - 37 seconds Final    Therapeutic Heparin Range =  60-90 seconds    Troponin I 01/18/2020 <0 02  <=0 04 ng/mL Final    3Autovalidation override  [employer] Chemistry analyzer 99% cutoff is > 0 04 ng/mL in network labs     o cTnI 99% cutoff is useful only when applied to patients in the clinical setting of myocardial ischemia   o cTnI 99% cutoff should be interpreted in the context of clinical history, ECG findings and possibly cardiac imaging to establish correct diagnosis  o cTnI 99% cutoff may be suggestive but clearly not indicative of a coronary event without the clinical setting of myocardial ischemia   Troponin I 01/18/2020 <0 02  <=0 04 ng/mL Final    3Autovalidation override  [employer] Chemistry analyzer 99% cutoff is > 0 04 ng/mL in network labs     o cTnI 99% cutoff is useful only when applied to patients in the clinical setting of myocardial ischemia   o cTnI 99% cutoff should be interpreted in the context of clinical history, ECG findings and possibly cardiac imaging to establish correct diagnosis  o cTnI 99% cutoff may be suggestive but clearly not indicative of a coronary event without the clinical setting of myocardial ischemia        Ventricular Rate 01/18/2020 67  BPM Final    Atrial Rate 01/18/2020 67  BPM Final    MN Interval 01/18/2020 138  ms Final    QRSD Interval 01/18/2020 106  ms Final    QT Interval 01/18/2020 374  ms Final    QTC Interval 01/18/2020 395  ms Final    P Axis 01/18/2020 58  degrees Final    QRS Axis 01/18/2020 93  degrees Final    T Wave Axis 01/18/2020 66  degrees Final    Ventricular Rate 01/18/2020 81  BPM Final    Atrial Rate 01/18/2020 81  BPM Final    MN Interval 01/18/2020 138  ms Final    QRSD Interval 01/18/2020 94  ms Final    QT Interval 01/18/2020 340  ms Final    QTC Interval 01/18/2020 394  ms Final    P Axis 01/18/2020 43  degrees Final    QRS Axis 01/18/2020 92  degrees Final    T Wave Axis 01/18/2020 58  degrees Final         Clearence MD Dariela

## 2020-01-28 ENCOUNTER — HOSPITAL ENCOUNTER (OUTPATIENT)
Dept: NON INVASIVE DIAGNOSTICS | Age: 45
Discharge: HOME | End: 2020-01-28
Payer: COMMERCIAL

## 2020-01-28 DIAGNOSIS — R07.9 CHEST PAIN: ICD-10-CM

## 2020-01-28 LAB
CHEST PAIN STATEMENT: NORMAL
MAX DIASTOLIC BP: 92 MMHG
MAX HEART RATE: 169 BPM
MAX PREDICTED HEART RATE: 174 BPM
MAX. SYSTOLIC BP: 166 MMHG
PROTOCOL NAME: NORMAL
REASON FOR TERMINATION: NORMAL
TARGET HR FORMULA: NORMAL
TEST INDICATION: NORMAL
TIME IN EXERCISE PHASE: NORMAL

## 2020-01-28 PROCEDURE — 93018 CV STRESS TEST I&R ONLY: CPT | Performed by: INTERNAL MEDICINE

## 2020-01-28 PROCEDURE — 78452 HT MUSCLE IMAGE SPECT MULT: CPT | Performed by: INTERNAL MEDICINE

## 2020-01-28 PROCEDURE — A9502 TC99M TETROFOSMIN: HCPCS

## 2020-01-28 PROCEDURE — 93017 CV STRESS TEST TRACING ONLY: CPT

## 2020-01-28 PROCEDURE — 93016 CV STRESS TEST SUPVJ ONLY: CPT | Performed by: INTERNAL MEDICINE

## 2020-01-28 PROCEDURE — 78452 HT MUSCLE IMAGE SPECT MULT: CPT

## 2020-02-19 ENCOUNTER — OFFICE VISIT (OUTPATIENT)
Dept: CARDIOLOGY CLINIC | Facility: CLINIC | Age: 45
End: 2020-02-19
Payer: COMMERCIAL

## 2020-02-19 VITALS
HEART RATE: 76 BPM | WEIGHT: 175 LBS | HEIGHT: 68 IN | SYSTOLIC BLOOD PRESSURE: 120 MMHG | DIASTOLIC BLOOD PRESSURE: 70 MMHG | BODY MASS INDEX: 26.52 KG/M2

## 2020-02-19 DIAGNOSIS — E78.00 HYPERCHOLESTEREMIA: ICD-10-CM

## 2020-02-19 DIAGNOSIS — I25.10 CORONARY ARTERY DISEASE INVOLVING NATIVE CORONARY ARTERY OF NATIVE HEART WITHOUT ANGINA PECTORIS: Primary | ICD-10-CM

## 2020-02-19 DIAGNOSIS — I10 ESSENTIAL HYPERTENSION: ICD-10-CM

## 2020-02-19 PROCEDURE — 3074F SYST BP LT 130 MM HG: CPT | Performed by: INTERNAL MEDICINE

## 2020-02-19 PROCEDURE — 1036F TOBACCO NON-USER: CPT | Performed by: INTERNAL MEDICINE

## 2020-02-19 PROCEDURE — 3078F DIAST BP <80 MM HG: CPT | Performed by: INTERNAL MEDICINE

## 2020-02-19 PROCEDURE — 3008F BODY MASS INDEX DOCD: CPT | Performed by: INTERNAL MEDICINE

## 2020-02-19 PROCEDURE — 99214 OFFICE O/P EST MOD 30 MIN: CPT | Performed by: INTERNAL MEDICINE

## 2020-02-19 NOTE — PROGRESS NOTES
Cardiology Follow Up    Deandra Ocampo  1975  [id number]  [address] [address] [phone]    1  Coronary artery disease involving native coronary artery of native heart without angina pectoris  Lipid Panel with Direct LDL reflex    Lipid Panel with Direct LDL reflex   2  Essential hypertension     3  Hypercholesteremia  Lipid Panel with Direct LDL reflex    Lipid Panel with Direct LDL reflex       Interval History:  Cardiology follow-up after recent testing, the patient sees my partner Segun Garner, he presented to emergency room with some chest discomfort, described as a sharp midsternal discomfort without radiation, he did feel nauseous which is reminiscent of his previous myocardial infarction although the chest pain was different  He present to the emergency room where evaluation was unrevealing  He was scheduled for stress test, personally reviewed, he did 11 minutes of Maxi protocol achieving target heart rate, there was no EKG criteria for ischemia nuclear portion was normal ejection fraction 59%  Since that time, he has been feeling better, he was found to have a otitis media for which he received antibiotics, states been compliant with low-cholesterol diet, I do not have a recent fractionation, LDL from 2 years ago was 68 with an HDL 41, he is on high-intensity statin therapy  States been compliant with low-sodium diet, blood pressures been well control as well his active mostly at work, does not do regular exercise      Patient Active Problem List   Diagnosis    Anxiety    Coronary artery disease    Mental disorder    Substance abuse ([facility] [address] )    Epididymitis, left    Glaucoma    Pain of right heel    HTN (hypertension)    Hypercholesteremia    Impaired fasting glucose    Left foot pain    Lump of skin    Numerous moles    Sciatica    Vitamin D deficiency    Old myocardial infarction     Past Medical History:   Diagnosis Date    Anterolateral myocardial infarction [facility])     last assessed 10/30/17    Chest pain     Glaucoma     Heart disease     Hypercholesterolemia     Hypertension     Uveitis     Visual impairment      Social History     Socioeconomic History    Marital status: /Civil Union     Spouse name: Not on file    Number of children: Not on file    Years of education: Not on file    Highest education level: Not on file   Occupational History    Occupation: employed   Social Needs    Financial resource strain: Not on file    Food insecurity:     Worry: Not on file     Inability: Not on file   [employer] needs:     Medical: Not on file     Non-medical: Not on file   Tobacco Use    Smoking status: Former Smoker    Smokeless tobacco: Never Used   Substance and Sexual Activity    Alcohol use: No    Drug use: Yes     Types: Marijuana    Sexual activity: Not on file   Lifestyle    Physical activity:     Days per week: Not on file     Minutes per session: Not on file    Stress: Not on file   Relationships    Social connections:     Talks on phone: Not on file     Gets together: Not on file     Attends Zoroastrian service: Not on file     Active member of club or organization: Not on file     Attends meetings of clubs or organizations: Not on file     Relationship status: Not on file    Intimate partner violence:     Fear of current or ex partner: Not on file     Emotionally abused: Not on file     Physically abused: Not on file     Forced sexual activity: Not on file   Other Topics Concern    Not on file   Social History Narrative    Live with wife  Supportive and Safe at home  Sees dentist occasionally        No mental or sub in self      Always uses seatbelt    Exercise: walking    No advance directive    No living will    Occasional caffeine consumption      Family History   Problem Relation Age of Onset    Blindness Mother retinitis pigmentosis    Heart attack Father         1st heart attack at age 28- had CABG in past    Diabetes Father     Blindness Maternal Aunt         retinitis pigmentosis    Dislocations Family     Heart disease Family     Hypertension Family     Mental illness Neg Hx     Substance Abuse Neg Hx      Past Surgical History:   Procedure Laterality Date    APPENDECTOMY      CARDIAC CATHETERIZATION      CARDIAC SURGERY      CORONARY STENT PLACEMENT      EYE SURGERY      TONSILLECTOMY         Current Outpatient Medications:     aspirin (ECOTRIN LOW STRENGTH) 81 mg EC tablet, Take 81 mg by mouth daily, Disp: , Rfl:     atorvastatin (LIPITOR) 40 mg tablet, Take 1 tablet (40 mg total) by mouth daily, Disp: 90 tablet, Rfl: 3    Cholecalciferol (VITAMIN D3) 2000 units TABS, Take 1 tablet by mouth daily, Disp: , Rfl:     dorzolamide (TRUSOPT) 2 % ophthalmic solution, Administer 1 drop into the left eye daily, Disp: , Rfl:     fluorometholone (FML) 0 1 % ophthalmic suspension, Administer 1 drop into the left eye daily, Disp: , Rfl:     lisinopril (ZESTRIL) 20 mg tablet, TAKE 1 TABLET BY MOUTH ONCE DAILY, Disp: 30 tablet, Rfl: 11    metoprolol succinate (TOPROL-XL) 50 mg 24 hr tablet, TAKE 1 TABLET BY MOUTH ONCE DAILY, Disp: 30 tablet, Rfl: 11    nitroglycerin (NITROSTAT) 0 4 mg SL tablet, Place 1 tablet (0 4 mg total) under the tongue every 5 (five) minutes as needed for chest pain (Patient not taking: Reported on 2/19/2020), Disp: 90 tablet, Rfl: 0  Allergies   Allergen Reactions    Azithromycin      Annotation - [id number]: Listed in old chart   Penicillins        Labs:  Hospital Outpatient Visit on 01/28/2020   Component Date Value    Protocol Name 01/28/2020 Luis Carlos Hernandez Time In Exercise Phase 01/28/2020 00:11:30     MAX   SYSTOLIC BP 20/12/1086 680     Max Diastolic Bp 68/42/5601 92     Max Heart Rate 01/28/2020 169     Max Predicted Heart Rate 01/28/2020 174     Reason for Termination 01/28/2020 Fatigue     Test Indication 01/28/2020 Chest Discomfort     Target Hr Formular 01/28/2020 (220 - Age)*100%     Chest Pain Statement 01/28/2020 none    Admission on 01/18/2020, Discharged on 01/18/2020   Component Date Value    WBC 01/18/2020 7 14     RBC 01/18/2020 5 12     Hemoglobin 01/18/2020 15 2     Hematocrit 01/18/2020 46 0     MCV 01/18/2020 90     MCH 01/18/2020 29 7     MCHC 01/18/2020 33 0     RDW 01/18/2020 11 8     MPV 01/18/2020 9 5     Platelets 42[birth year]/1965 233     nRBC 01/18/2020 0     Neutrophils Relative 01/18/2020 47     Immat GRANS % 01/18/2020 0     Lymphocytes Relative 01/18/2020 45*    Monocytes Relative 01/18/2020 7     Eosinophils Relative 01/18/2020 1     Basophils Relative 01/18/2020 0     Neutrophils Absolute 01/18/2020 3 24     Immature Grans Absolute 01/18/2020 0 02     Lymphocytes Absolute 01/18/2020 3 23     Monocytes Absolute 01/18/2020 0 53     Eosinophils Absolute 01/18/2020 0 10     Basophils Absolute 01/18/2020 0 02     Sodium 01/18/2020 144     Potassium 01/18/2020 4 0     Chloride 01/18/2020 106     CO2 01/18/2020 30     ANION GAP 01/18/2020 8     BUN 01/18/2020 17     Creatinine 01/18/2020 1 08     Glucose 01/18/2020 98     Calcium 01/18/2020 9 1     AST 01/18/2020 25     ALT 01/18/2020 36     Alkaline Phosphatase 01/18/2020 57     Total Protein 01/18/2020 7 0     Albumin 01/18/2020 4 1     Total Bilirubin 01/18/2020 0 60     eGFR 01/18/2020 82     Protime 01/18/2020 12 9     INR 01/18/2020 1 00     PTT 01/18/2020 26     Troponin I 01/18/2020 <0 02     Troponin I 01/18/2020 <0 02     Ventricular Rate 01/18/2020 67     Atrial Rate 01/18/2020 67     AK Interval 01/18/2020 138     QRSD Interval 01/18/2020 106     QT Interval 01/18/2020 374     QTC Interval 01/18/2020 395     P Axis 01/18/2020 58     QRS Axis 01/18/2020 93     T Wave Axis 01/18/2020 66     Ventricular Rate 01/18/2020 81     Atrial Rate 01/18/2020 81     WA Interval 01/18/2020 138     QRSD Interval 01/18/2020 94     QT Interval 01/18/2020 340     QTC Interval 01/18/2020 394     P Axis 01/18/2020 43     QRS Axis 01/18/2020 92     T Wave Axis 01/18/2020 58      Imaging: No results found  Review of Systems:  Review of Systems   Constitutional: Negative for fatigue  HENT: Positive for congestion  Negative for hearing loss and nosebleeds  Eyes: Negative for visual disturbance  Respiratory: Negative for apnea, shortness of breath, wheezing and stridor  Cardiovascular: Positive for chest pain  Negative for palpitations and leg swelling  Gastrointestinal: Negative for abdominal pain, anal bleeding and blood in stool  Endocrine: Negative for cold intolerance  Genitourinary: Negative for hematuria  Musculoskeletal: Negative for arthralgias, gait problem and myalgias  Skin: Negative for pallor  Allergic/Immunologic: Negative for immunocompromised state  Neurological: Negative for dizziness and syncope  Hematological: Does not bruise/bleed easily  Psychiatric/Behavioral: Negative for sleep disturbance  The patient is not nervous/anxious  Physical Exam:  Physical Exam   Constitutional: He appears well-developed and well-nourished  No distress  Eyes: No scleral icterus  Neck: No JVD present  Cardiovascular: Normal rate, regular rhythm, normal heart sounds and intact distal pulses  Exam reveals no gallop and no friction rub  No murmur heard  Pulmonary/Chest: Effort normal and breath sounds normal  No stridor  No respiratory distress  He has no wheezes  He has no rales  Musculoskeletal: He exhibits no edema  Skin: Skin is warm and dry  Capillary refill takes less than 2 seconds  He is not diaphoretic  Psychiatric: He has a normal mood and affect  Vitals reviewed        Discussion/Summary:  Coronary artery disease, premature, strong family history of premature coronary disease status post anterior myocardial infarction at age 40 0, PTCA/drug stent of the LAD at that time  Concomitant marginal disease 70% and 50% RCA and severe disease in the distal PLV of the RCA diffuse atherosclerosis noted on the catheterization  Recent stress test as described above suggested no ischemia left systolic function is normal   Continue current medications, regular exercise is recommended  Lipids will be checked  This note was completed in part utilizing [employer] direct voice recognition software  Grammatical errors, random word insertion, spelling mistakes, and incomplete sentences may be an occasional consequence of the system secondary to software limitations, ambient noise and hardware issues  At the time of dictation, efforts were made to edit, clarify and /or correct errors  Please read the chart carefully and recognize, using context, where substitutions have occurred  If you have any questions or concerns about the context, text or information contained within the body of this dictation, please contact myself, the provider, for further clarification

## 2020-04-17 DIAGNOSIS — I10 ESSENTIAL HYPERTENSION: ICD-10-CM

## 2020-04-17 RX ORDER — LISINOPRIL 20 MG/1
TABLET ORAL
Qty: 90 TABLET | Refills: 3 | Status: SHIPPED | OUTPATIENT
Start: 2020-04-17 | End: 2021-06-09 | Stop reason: SDUPTHER

## 2020-07-29 ENCOUNTER — OFFICE VISIT (OUTPATIENT)
Dept: FAMILY MEDICINE CLINIC | Facility: HOSPITAL | Age: 45
End: 2020-07-29
Payer: COMMERCIAL

## 2020-07-29 VITALS
HEIGHT: 68 IN | TEMPERATURE: 98.8 F | OXYGEN SATURATION: 97 % | SYSTOLIC BLOOD PRESSURE: 100 MMHG | WEIGHT: 170 LBS | HEART RATE: 70 BPM | BODY MASS INDEX: 25.76 KG/M2 | DIASTOLIC BLOOD PRESSURE: 76 MMHG

## 2020-07-29 DIAGNOSIS — H66.003 ACUTE SUPPURATIVE OTITIS MEDIA OF BOTH EARS WITHOUT SPONTANEOUS RUPTURE OF TYMPANIC MEMBRANES, RECURRENCE NOT SPECIFIED: Primary | ICD-10-CM

## 2020-07-29 PROCEDURE — 99213 OFFICE O/P EST LOW 20 MIN: CPT | Performed by: PHYSICIAN ASSISTANT

## 2020-07-29 PROCEDURE — 3008F BODY MASS INDEX DOCD: CPT | Performed by: PHYSICIAN ASSISTANT

## 2020-07-29 PROCEDURE — 1036F TOBACCO NON-USER: CPT | Performed by: PHYSICIAN ASSISTANT

## 2020-07-29 PROCEDURE — 3078F DIAST BP <80 MM HG: CPT | Performed by: PHYSICIAN ASSISTANT

## 2020-07-29 PROCEDURE — 3074F SYST BP LT 130 MM HG: CPT | Performed by: PHYSICIAN ASSISTANT

## 2020-07-29 RX ORDER — MELOXICAM 7.5 MG/1
TABLET ORAL
COMMUNITY
Start: 2020-06-17 | End: 2021-02-19 | Stop reason: ALTCHOICE

## 2020-07-29 RX ORDER — CEFUROXIME AXETIL 250 MG/1
250 TABLET ORAL EVERY 12 HOURS SCHEDULED
Qty: 20 TABLET | Refills: 0 | Status: SHIPPED | OUTPATIENT
Start: 2020-07-29 | End: 2021-05-10 | Stop reason: SDUPTHER

## 2020-07-29 NOTE — PATIENT INSTRUCTIONS
Start Ceftin 250 mg  Bid for 10 days  Increase fluid/water intake, and consider decongestant otc, and/or saline nasal flushes

## 2020-10-01 DIAGNOSIS — E78.00 HYPERCHOLESTEROLEMIA: ICD-10-CM

## 2020-10-02 RX ORDER — ATORVASTATIN CALCIUM 40 MG/1
TABLET, FILM COATED ORAL
Qty: 30 TABLET | Refills: 0 | Status: SHIPPED | OUTPATIENT
Start: 2020-10-02 | End: 2020-11-04

## 2020-11-04 DIAGNOSIS — E78.00 HYPERCHOLESTEROLEMIA: ICD-10-CM

## 2020-11-04 RX ORDER — ATORVASTATIN CALCIUM 40 MG/1
TABLET, FILM COATED ORAL
Qty: 30 TABLET | Refills: 0 | Status: SHIPPED | OUTPATIENT
Start: 2020-11-04 | End: 2020-12-10

## 2020-12-08 ENCOUNTER — OFFICE VISIT (OUTPATIENT)
Dept: FAMILY MEDICINE CLINIC | Facility: HOSPITAL | Age: 45
End: 2020-12-08
Payer: COMMERCIAL

## 2020-12-08 VITALS
HEART RATE: 85 BPM | DIASTOLIC BLOOD PRESSURE: 82 MMHG | HEIGHT: 68 IN | OXYGEN SATURATION: 98 % | BODY MASS INDEX: 26.37 KG/M2 | WEIGHT: 174 LBS | SYSTOLIC BLOOD PRESSURE: 132 MMHG | TEMPERATURE: 98.4 F

## 2020-12-08 DIAGNOSIS — I25.10 CORONARY ARTERY DISEASE INVOLVING NATIVE CORONARY ARTERY OF NATIVE HEART WITHOUT ANGINA PECTORIS: ICD-10-CM

## 2020-12-08 DIAGNOSIS — R10.12 LUQ PAIN: Primary | ICD-10-CM

## 2020-12-08 DIAGNOSIS — K59.00 CONSTIPATION, UNSPECIFIED CONSTIPATION TYPE: ICD-10-CM

## 2020-12-08 PROCEDURE — 3075F SYST BP GE 130 - 139MM HG: CPT | Performed by: INTERNAL MEDICINE

## 2020-12-08 PROCEDURE — 3008F BODY MASS INDEX DOCD: CPT | Performed by: INTERNAL MEDICINE

## 2020-12-08 PROCEDURE — 1036F TOBACCO NON-USER: CPT | Performed by: INTERNAL MEDICINE

## 2020-12-08 PROCEDURE — 99214 OFFICE O/P EST MOD 30 MIN: CPT | Performed by: INTERNAL MEDICINE

## 2020-12-08 PROCEDURE — 3079F DIAST BP 80-89 MM HG: CPT | Performed by: INTERNAL MEDICINE

## 2020-12-09 LAB
ALBUMIN SERPL-MCNC: 4.8 G/DL (ref 4–5)
ALBUMIN/GLOB SERPL: 2.4 {RATIO} (ref 1.2–2.2)
ALP SERPL-CCNC: 72 IU/L (ref 39–117)
ALT SERPL-CCNC: 40 IU/L (ref 0–44)
AMYLASE SERPL-CCNC: 81 U/L (ref 31–110)
AST SERPL-CCNC: 28 IU/L (ref 0–40)
BASOPHILS # BLD AUTO: 0 X10E3/UL (ref 0–0.2)
BASOPHILS NFR BLD AUTO: 0 %
BILIRUB SERPL-MCNC: 0.4 MG/DL (ref 0–1.2)
BUN SERPL-MCNC: 12 MG/DL (ref 6–24)
BUN/CREAT SERPL: 12 (ref 9–20)
CALCIUM SERPL-MCNC: 9.5 MG/DL (ref 8.7–10.2)
CHLORIDE SERPL-SCNC: 101 MMOL/L (ref 96–106)
CO2 SERPL-SCNC: 27 MMOL/L (ref 20–29)
CREAT SERPL-MCNC: 1.02 MG/DL (ref 0.76–1.27)
EOSINOPHIL # BLD AUTO: 0.1 X10E3/UL (ref 0–0.4)
EOSINOPHIL NFR BLD AUTO: 1 %
ERYTHROCYTE [DISTWIDTH] IN BLOOD BY AUTOMATED COUNT: 12.2 % (ref 11.6–15.4)
GLOBULIN SER-MCNC: 2 G/DL (ref 1.5–4.5)
GLUCOSE SERPL-MCNC: 85 MG/DL (ref 65–99)
HCT VFR BLD AUTO: 47 % (ref 37.5–51)
HGB BLD-MCNC: 15.9 G/DL (ref 13–17.7)
IMM GRANULOCYTES # BLD: 0 X10E3/UL (ref 0–0.1)
IMM GRANULOCYTES NFR BLD: 1 %
LIPASE SERPL-CCNC: 108 U/L (ref 13–78)
LYMPHOCYTES # BLD AUTO: 2.7 X10E3/UL (ref 0.7–3.1)
LYMPHOCYTES NFR BLD AUTO: 39 %
MCH RBC QN AUTO: 30.3 PG (ref 26.6–33)
MCHC RBC AUTO-ENTMCNC: 33.8 G/DL (ref 31.5–35.7)
MCV RBC AUTO: 90 FL (ref 79–97)
MONOCYTES # BLD AUTO: 0.6 X10E3/UL (ref 0.1–0.9)
MONOCYTES NFR BLD AUTO: 9 %
NEUTROPHILS # BLD AUTO: 3.5 X10E3/UL (ref 1.4–7)
NEUTROPHILS NFR BLD AUTO: 50 %
PLATELET # BLD AUTO: 277 X10E3/UL (ref 150–450)
POTASSIUM SERPL-SCNC: 4.6 MMOL/L (ref 3.5–5.2)
PROT SERPL-MCNC: 6.8 G/DL (ref 6–8.5)
RBC # BLD AUTO: 5.25 X10E6/UL (ref 4.14–5.8)
SL AMB EGFR AFRICAN AMERICAN: 101 ML/MIN/1.73
SL AMB EGFR NON AFRICAN AMERICAN: 87 ML/MIN/1.73
SODIUM SERPL-SCNC: 140 MMOL/L (ref 134–144)
WBC # BLD AUTO: 6.9 X10E3/UL (ref 3.4–10.8)

## 2020-12-10 DIAGNOSIS — E78.00 HYPERCHOLESTEROLEMIA: ICD-10-CM

## 2020-12-10 DIAGNOSIS — I10 ESSENTIAL HYPERTENSION: ICD-10-CM

## 2020-12-10 RX ORDER — ATORVASTATIN CALCIUM 40 MG/1
TABLET, FILM COATED ORAL
Qty: 30 TABLET | Refills: 0 | Status: SHIPPED | OUTPATIENT
Start: 2020-12-10 | End: 2021-01-14

## 2020-12-10 RX ORDER — METOPROLOL SUCCINATE 50 MG/1
50 TABLET, EXTENDED RELEASE ORAL DAILY
Qty: 30 TABLET | Refills: 11 | Status: SHIPPED | OUTPATIENT
Start: 2020-12-10 | End: 2021-02-19 | Stop reason: SDUPTHER

## 2020-12-13 ENCOUNTER — HOSPITAL ENCOUNTER (EMERGENCY)
Facility: HOSPITAL | Age: 45
Discharge: HOME | End: 2020-12-13
Attending: EMERGENCY MEDICINE | Admitting: EMERGENCY MEDICINE
Payer: COMMERCIAL

## 2020-12-13 ENCOUNTER — APPOINTMENT (EMERGENCY)
Dept: RADIOLOGY | Facility: HOSPITAL | Age: 45
End: 2020-12-13
Payer: COMMERCIAL

## 2020-12-13 VITALS
HEART RATE: 68 BPM | WEIGHT: 173.94 LBS | DIASTOLIC BLOOD PRESSURE: 93 MMHG | OXYGEN SATURATION: 95 % | RESPIRATION RATE: 14 BRPM | BODY MASS INDEX: 26.36 KG/M2 | TEMPERATURE: 99.1 F | SYSTOLIC BLOOD PRESSURE: 129 MMHG | HEIGHT: 68 IN

## 2020-12-13 DIAGNOSIS — R20.2 PARESTHESIAS: ICD-10-CM

## 2020-12-13 DIAGNOSIS — R10.12 LUQ ABDOMINAL PAIN: Primary | ICD-10-CM

## 2020-12-13 LAB
ALBUMIN SERPL BCP-MCNC: 3.8 G/DL (ref 3.5–5)
ALP SERPL-CCNC: 56 U/L (ref 46–116)
ALT SERPL W P-5'-P-CCNC: 42 U/L (ref 12–78)
ANION GAP SERPL CALCULATED.3IONS-SCNC: 5 MMOL/L (ref 4–13)
AST SERPL W P-5'-P-CCNC: 25 U/L (ref 5–45)
ATRIAL RATE: 72 BPM
BASOPHILS # BLD AUTO: 0.02 THOUSANDS/ΜL (ref 0–0.1)
BASOPHILS NFR BLD AUTO: 0 % (ref 0–1)
BILIRUB SERPL-MCNC: 0.7 MG/DL (ref 0.2–1)
BUN SERPL-MCNC: 13 MG/DL (ref 5–25)
CALCIUM SERPL-MCNC: 9.1 MG/DL (ref 8.3–10.1)
CHLORIDE SERPL-SCNC: 103 MMOL/L (ref 100–108)
CO2 SERPL-SCNC: 29 MMOL/L (ref 21–32)
CREAT SERPL-MCNC: 0.99 MG/DL (ref 0.6–1.3)
EOSINOPHIL # BLD AUTO: 0.05 THOUSAND/ΜL (ref 0–0.61)
EOSINOPHIL NFR BLD AUTO: 1 % (ref 0–6)
ERYTHROCYTE [DISTWIDTH] IN BLOOD BY AUTOMATED COUNT: 11.6 % (ref 11.6–15.1)
GFR SERPL CREATININE-BSD FRML MDRD: 90 ML/MIN/1.73SQ M
GLUCOSE SERPL-MCNC: 92 MG/DL (ref 65–140)
HCT VFR BLD AUTO: 46.4 % (ref 36.5–49.3)
HGB BLD-MCNC: 15.5 G/DL (ref 12–17)
IMM GRANULOCYTES # BLD AUTO: 0.01 THOUSAND/UL (ref 0–0.2)
IMM GRANULOCYTES NFR BLD AUTO: 0 % (ref 0–2)
LIPASE SERPL-CCNC: 117 U/L (ref 73–393)
LYMPHOCYTES # BLD AUTO: 1.6 THOUSANDS/ΜL (ref 0.6–4.47)
LYMPHOCYTES NFR BLD AUTO: 33 % (ref 14–44)
MCH RBC QN AUTO: 29.6 PG (ref 26.8–34.3)
MCHC RBC AUTO-ENTMCNC: 33.4 G/DL (ref 31.4–37.4)
MCV RBC AUTO: 89 FL (ref 82–98)
MONOCYTES # BLD AUTO: 0.38 THOUSAND/ΜL (ref 0.17–1.22)
MONOCYTES NFR BLD AUTO: 8 % (ref 4–12)
NEUTROPHILS # BLD AUTO: 2.86 THOUSANDS/ΜL (ref 1.85–7.62)
NEUTS SEG NFR BLD AUTO: 58 % (ref 43–75)
NRBC BLD AUTO-RTO: 0 /100 WBCS
P AXIS: 45 DEGREES
PLATELET # BLD AUTO: 232 THOUSANDS/UL (ref 149–390)
PMV BLD AUTO: 9.4 FL (ref 8.9–12.7)
POTASSIUM SERPL-SCNC: 4.2 MMOL/L (ref 3.5–5.3)
PR INTERVAL: 148 MS
PROT SERPL-MCNC: 6.6 G/DL (ref 6.4–8.2)
QRS AXIS: 99 DEGREES
QRSD INTERVAL: 90 MS
QT INTERVAL: 340 MS
QTC INTERVAL: 372 MS
RBC # BLD AUTO: 5.24 MILLION/UL (ref 3.88–5.62)
SODIUM SERPL-SCNC: 137 MMOL/L (ref 136–145)
T WAVE AXIS: 59 DEGREES
TROPONIN I SERPL-MCNC: <0.02 NG/ML
VENTRICULAR RATE: 72 BPM
WBC # BLD AUTO: 4.92 THOUSAND/UL (ref 4.31–10.16)

## 2020-12-13 PROCEDURE — 99285 EMERGENCY DEPT VISIT HI MDM: CPT | Performed by: PHYSICIAN ASSISTANT

## 2020-12-13 PROCEDURE — 85025 COMPLETE CBC W/AUTO DIFF WBC: CPT | Performed by: PHYSICIAN ASSISTANT

## 2020-12-13 PROCEDURE — 93005 ELECTROCARDIOGRAM TRACING: CPT

## 2020-12-13 PROCEDURE — 80053 COMPREHEN METABOLIC PANEL: CPT | Performed by: PHYSICIAN ASSISTANT

## 2020-12-13 PROCEDURE — 71045 X-RAY EXAM CHEST 1 VIEW: CPT

## 2020-12-13 PROCEDURE — 83690 ASSAY OF LIPASE: CPT | Performed by: PHYSICIAN ASSISTANT

## 2020-12-13 PROCEDURE — 84484 ASSAY OF TROPONIN QUANT: CPT | Performed by: PHYSICIAN ASSISTANT

## 2020-12-13 PROCEDURE — 36415 COLL VENOUS BLD VENIPUNCTURE: CPT | Performed by: PHYSICIAN ASSISTANT

## 2020-12-13 PROCEDURE — 99284 EMERGENCY DEPT VISIT MOD MDM: CPT

## 2020-12-13 PROCEDURE — 93010 ELECTROCARDIOGRAM REPORT: CPT | Performed by: INTERNAL MEDICINE

## 2020-12-15 ENCOUNTER — HOSPITAL ENCOUNTER (OUTPATIENT)
Dept: CT IMAGING | Facility: HOSPITAL | Age: 45
Discharge: HOME | End: 2020-12-15
Attending: INTERNAL MEDICINE
Payer: COMMERCIAL

## 2020-12-15 DIAGNOSIS — K59.00 CONSTIPATION, UNSPECIFIED CONSTIPATION TYPE: ICD-10-CM

## 2020-12-15 DIAGNOSIS — R10.12 LUQ PAIN: ICD-10-CM

## 2020-12-15 PROCEDURE — G1004 CDSM NDSC: HCPCS

## 2020-12-15 PROCEDURE — 74177 CT ABD & PELVIS W/CONTRAST: CPT

## 2020-12-15 RX ADMIN — IOHEXOL 100 ML: 350 INJECTION, SOLUTION INTRAVENOUS at 14:42

## 2020-12-16 ENCOUNTER — TELEPHONE (OUTPATIENT)
Dept: FAMILY MEDICINE CLINIC | Facility: HOSPITAL | Age: 45
End: 2020-12-16

## 2020-12-23 ENCOUNTER — TELEMEDICINE (OUTPATIENT)
Dept: GASTROENTEROLOGY | Facility: CLINIC | Age: 45
End: 2020-12-23

## 2020-12-23 VITALS — BODY MASS INDEX: 25.46 KG/M2 | HEIGHT: 68 IN | WEIGHT: 168 LBS

## 2020-12-23 DIAGNOSIS — K59.00 CONSTIPATION, UNSPECIFIED CONSTIPATION TYPE: Primary | ICD-10-CM

## 2020-12-23 PROCEDURE — 3008F BODY MASS INDEX DOCD: CPT | Performed by: INTERNAL MEDICINE

## 2020-12-23 RX ORDER — SODIUM PICOSULFATE, MAGNESIUM OXIDE, AND ANHYDROUS CITRIC ACID 10; 3.5; 12 MG/160ML; G/160ML; G/160ML
LIQUID ORAL
Qty: 2 BOTTLE | Refills: 0 | Status: SHIPPED | OUTPATIENT
Start: 2020-12-23 | End: 2020-12-30 | Stop reason: HOSPADM

## 2020-12-30 ENCOUNTER — ANESTHESIA (OUTPATIENT)
Dept: GASTROENTEROLOGY | Facility: AMBULATORY SURGERY CENTER | Age: 45
End: 2020-12-30

## 2020-12-30 ENCOUNTER — ANESTHESIA EVENT (OUTPATIENT)
Dept: GASTROENTEROLOGY | Facility: AMBULATORY SURGERY CENTER | Age: 45
End: 2020-12-30

## 2020-12-30 ENCOUNTER — HOSPITAL ENCOUNTER (OUTPATIENT)
Dept: GASTROENTEROLOGY | Facility: AMBULATORY SURGERY CENTER | Age: 45
Discharge: HOME | End: 2020-12-30
Payer: COMMERCIAL

## 2020-12-30 VITALS
DIASTOLIC BLOOD PRESSURE: 102 MMHG | SYSTOLIC BLOOD PRESSURE: 152 MMHG | RESPIRATION RATE: 26 BRPM | HEART RATE: 72 BPM | TEMPERATURE: 97.8 F | OXYGEN SATURATION: 100 %

## 2020-12-30 VITALS — HEART RATE: 88 BPM

## 2020-12-30 DIAGNOSIS — Z86.010 HISTORY OF COLON POLYPS: ICD-10-CM

## 2020-12-30 PROCEDURE — G0105 COLORECTAL SCRN; HI RISK IND: HCPCS | Performed by: INTERNAL MEDICINE

## 2020-12-30 RX ORDER — PROPOFOL 10 MG/ML
INJECTION, EMULSION INTRAVENOUS AS NEEDED
Status: DISCONTINUED | OUTPATIENT
Start: 2020-12-30 | End: 2020-12-30

## 2020-12-30 RX ORDER — SODIUM CHLORIDE 9 MG/ML
50 INJECTION, SOLUTION INTRAVENOUS CONTINUOUS
Status: DISCONTINUED | OUTPATIENT
Start: 2020-12-30 | End: 2021-01-03 | Stop reason: HOSPADM

## 2020-12-30 RX ADMIN — PROPOFOL 50 MG: 10 INJECTION, EMULSION INTRAVENOUS at 11:17

## 2020-12-30 RX ADMIN — PROPOFOL 50 MG: 10 INJECTION, EMULSION INTRAVENOUS at 11:09

## 2020-12-30 RX ADMIN — SODIUM CHLORIDE 50 ML/HR: 9 INJECTION, SOLUTION INTRAVENOUS at 10:27

## 2020-12-30 RX ADMIN — PROPOFOL 50 MG: 10 INJECTION, EMULSION INTRAVENOUS at 11:13

## 2020-12-30 RX ADMIN — PROPOFOL 50 MG: 10 INJECTION, EMULSION INTRAVENOUS at 11:06

## 2020-12-30 RX ADMIN — PROPOFOL 100 MG: 10 INJECTION, EMULSION INTRAVENOUS at 11:04

## 2020-12-30 NOTE — DISCHARGE INSTRUCTIONS
High Fiber Diet   WHAT YOU NEED TO KNOW:   A high-fiber diet includes foods that have a high amount of fiber  Fiber is the part of fruits, vegetables, and grains that is not broken down by your body  Fiber keeps your bowel movements regular  Fiber can also help lower your cholesterol level, control blood sugar in people with diabetes, and relieve constipation  Fiber can also help you control your weight because it helps you feel full faster  Most adults should eat 25 to 35 grams of fiber each day  Talk to your dietitian or healthcare provider about the amount of fiber you need  DISCHARGE INSTRUCTIONS:   Good sources of fiber:       · Foods with at least 4 grams of fiber per serving:      ? ? to ½ cup of high-fiber cereal (check the nutrition label on the box)    ? ½ cup of blackberries or raspberries    ? 4 dried prunes    ? 1 cooked artichoke    ? ½ cup of cooked legumes, such as lentils, or red, kidney, and pascual beans    · Foods with 1 to 3 grams of fiber per serving:      ? 1 slice of whole-wheat, pumpernickel, or rye bread    ? ½ cup of cooked brown rice    ? 4 whole-wheat crackers    ? 1 cup of oatmeal    ? ½ cup of cereal with 1 to 3 grams of fiber per serving (check the nutrition label on the box)    ? 1 small piece of fruit, such as an apple, banana, pear, kiwi, or orange    ? 3 dates    ? ½ cup of canned apricots, fruit cocktail, peaches, or pears    ? ½ cup of raw or cooked vegetables, such as carrots, cauliflower, cabbage, spinach, squash, or corn  Ways that you can increase fiber in your diet:   · Choose brown or wild rice instead of white rice  · Use whole wheat flour in recipes instead of white or all-purpose flour  · Add beans and peas to casseroles or soups  · Choose fresh fruit and vegetables with peels or skins on instead of juices  Other diet guidelines to follow:   · Add fiber to your diet slowly    You may have abdominal discomfort, bloating, and gas if you add fiber to your diet too quickly  · Drink plenty of liquids as you add fiber to your diet  You may have nausea or develop constipation if you do not drink enough water  Ask how much liquid to drink each day and which liquids are best for you  © Copyright [address] Information is for End User's use only and may not be sold, redistributed or otherwise used for commercial purposes  All illustrations and images included in CareNotes® are the copyrighted property of A D A M , Inc  or [facility]   The above information is an  only  It is not intended as medical advice for individual conditions or treatments  Talk to your doctor, nurse or pharmacist before following any medical regimen to see if it is safe and effective for you  Hemorrhoids   WHAT YOU NEED TO KNOW:   What are hemorrhoids? Hemorrhoids are swollen blood vessels inside your rectum (internal hemorrhoids) or on your anus (external hemorrhoids)  Sometimes a hemorrhoid may prolapse  This means it extends out of your anus  What increases my risk for hemorrhoids? · Pregnancy or obesity    · Straining or sitting for a long time during bowel movements    · Liver disease    · Weak muscles around the anus caused by older age, rectal surgery, or anal intercourse    · A lack of physical activity    · Chronic diarrhea or constipation    · A low-fiber diet    What are the signs and symptoms of hemorrhoids? · Pain or itching around your anus or inside your rectum    · Swelling or bumps around your anus    · Bright red blood in your bowel movement, on the toilet paper, or in the toilet bowl    · Tissue bulging out of your anus (prolapsed hemorrhoids)    · Incontinence (poor control over urine or bowel movements)    How are hemorrhoids diagnosed? Your healthcare provider will ask about your symptoms, the foods you eat, and your bowel movements  He or she will examine your anus for external hemorrhoids   You may need the following:  · A digital rectal exam  is a test to check for hemorrhoids  Your healthcare provider will put a gloved finger inside your anus to feel for the hemorrhoids  · An anoscopy  is a test that uses a scope (small tube with a light and camera on the end) to look at your hemorrhoids  How are hemorrhoids treated? Treatment will depend on your symptoms  You may need any of the following:  · Medicines  can help decrease pain and swelling, and soften your bowel movement  The medicine may be a pill, pad, cream, or ointment  · Procedures  may be used to shrink or remove your hemorrhoid  Examples include rubber-band ligation, sclerotherapy, and photocoagulation  These procedures may be done in your healthcare provider's office  Ask your healthcare provider for more information about these procedures  · Surgery  may be needed to shrink or remove your hemorrhoids  How can I manage my symptoms? · Apply ice on your anus for 15 to 20 minutes every hour or as directed  Use an ice pack, or put crushed ice in a plastic bag  Cover it with a towel before you apply it to your anus  Ice helps prevent tissue damage and decreases swelling and pain  · Take a sitz bath  Fill a bathtub with 4 to 6 inches of warm water  You may also use a sitz bath pan that fits inside a toilet bowl  Sit in the sitz bath for 15 minutes  Do this 3 times a day, and after each bowel movement  The warm water can help decrease pain and swelling  · Keep your anal area clean  Gently wash the area with warm water daily  Soap may irritate the area  After a bowel movement, wipe with moist towelettes or wet toilet paper  Dry toilet paper can irritate the area  How can I help prevent hemorrhoids? · Do not strain to have a bowel movement  Do not sit on the toilet too long  These actions can increase pressure on the tissues in your rectum and anus  · Drink plenty of liquids  Liquids can help prevent constipation   Ask how much liquid to drink each day and which liquids are best for you  · Eat a variety of high-fiber foods  Examples include fruits, vegetables, and whole grains  Ask your healthcare provider how much fiber you need each day  You may need to take a fiber supplement  · Exercise as directed  Exercise, such as walking, may make it easier to have a bowel movement  Ask your healthcare provider to help you create an exercise plan  · Do not have anal sex  Anal sex can weaken the skin around your rectum and anus  · Avoid heavy lifting  This can cause straining and increase your risk for another hemorrhoid  When should I seek immediate care? · You have severe pain in your rectum or around your anus  · You have severe pain in your abdomen and you are vomiting  · You have bleeding from your anus that soaks through your underwear  When should I contact my healthcare provider? · You have frequent and painful bowel movements  · Your hemorrhoid looks or feels more swollen than usual      · You do not have a bowel movement for 2 days or more  · You see or feel tissue coming through your anus  · You have questions or concerns about your condition or care  CARE AGREEMENT:   You have the right to help plan your care  Learn about your health condition and how it may be treated  Discuss treatment options with your healthcare providers to decide what care you want to receive  You always have the right to refuse treatment  The above information is an  only  It is not intended as medical advice for individual conditions or treatments  Talk to your doctor, nurse or pharmacist before following any medical regimen to see if it is safe and effective for you  © Copyright [address] Information is for End User's use only and may not be sold, redistributed or otherwise used for commercial purposes   All illustrations and images included in CareNotes® are the copyrighted property of [employer]  or [employer] [facility]  Diverticulosis   WHAT YOU NEED TO KNOW:   What is diverticulosis? Diverticulosis is a condition that causes small pockets called diverticula to form in your intestine  These pockets make it difficult for bowel movements to pass through your digestive system  What causes diverticulosis? Diverticula form when muscles have to work hard to move bowel movements through the intestine  The force causes bulges to form at weak areas in the intestine  This may happen if you eat foods that are low in fiber  Fiber helps give your bowel movements more bulk so they are larger and easier to move through your colon  The following may increase your risk of diverticulosis:  · A history of constipation    · Age 36 or older    · Obesity    · Lack of exercise    What are the signs and symptoms of diverticulosis? Diverticulosis usually does not cause any signs or symptoms  It may cause any of the following in some people:  · Pain or discomfort in your lower abdomen    · Abdominal bloating    · Constipation or diarrhea    How is diverticulosis diagnosed? Your healthcare provider will examine you and ask about your bowel movements, diet, and symptoms  He or she will also ask about any medical conditions you have or medicines you take  You may need any of the following:  · Blood tests  may be done to check for signs of inflammation  · A barium enema  is an x-ray of your colon that may show diverticula  A tube is put into your anus, and a liquid called barium is put through the tube  Barium is used so that healthcare providers can see your colon more clearly  · Flexible sigmoidoscopy  is a test to look for any changes in your lower intestines and rectum  It may also show the cause of any bleeding or pain  A soft, bendable tube with a light on the end will be put into your anus  It will then be moved forward into your intestine  · A colonoscopy  is used to look at your whole colon   A scope (long bendable tube with a light on the end) is used to take pictures  This test may show diverticula  · A CT scan , or CAT scan, may show diverticula  You may be given contrast liquid before the scan  Tell the healthcare provider if you have ever had an allergic reaction to contrast liquid  How is diverticulosis managed? The goal of treatment is to manage any symptoms you have and prevent other problems such as diverticulitis  Diverticulitis is swelling or infection of the diverticula  Your healthcare provider may recommend any of the following:  · Eat a variety of high-fiber foods  High-fiber foods help you have regular bowel movements  High-fiber foods include cooked beans, fruits, vegetables, and some cereals  Most adults need 25 to 35 grams of fiber each day  Your healthcare provider may recommend that you have more  Ask your healthcare provider how much fiber you need  Increase fiber slowly  You may have abdominal discomfort, bloating, and gas if you add fiber to your diet too quickly  You may need to take a fiber supplement if you are not getting enough fiber from food  · Medicines  to soften your bowel movements may be given  You may also need medicines to treat symptoms such as bloating and pain  · Drink liquids as directed  You may need to drink 2 to 3 liters (8 to 12 cups) of liquids every day  Ask your healthcare provider how much liquid to drink each day and which liquids are best for you  · Apply heat  on your abdomen for 20 to 30 minutes every 2 hours for as many days as directed  Heat helps decrease pain and muscle spasms  How can I help prevent diverticulitis or other symptoms? The following may help decrease your risk for diverticulitis or symptoms, such as bleeding  Talk to your provider about these or other things you can do to prevent problems that may occur with diverticulosis  · Exercise regularly  Ask your healthcare provider about the best exercise plan for you   Exercise can help you have regular bowel movements  Get 30 minutes of exercise on most days of the week  · Maintain a healthy weight  Ask your healthcare provider how much you should weigh  Ask him or her to help you create a weight loss plan if you are overweight  · Do not smoke  Nicotine and other chemicals in cigarettes increase your risk for diverticulitis  Ask your healthcare provider for information if you currently smoke and need help to quit  E-cigarettes or smokeless tobacco still contain nicotine  Talk to your healthcare provider before you use these products  · Ask your healthcare provider if it is safe to take NSAIDs  NSAIDs may increase your risk of diverticulitis  When should I seek immediate care? · You have severe pain on the left side of your lower abdomen  · Your bowel movements are bright or dark red  When should I contact my healthcare provider? · You have a fever and chills  · You feel dizzy or lightheaded  · You have nausea, or you are vomiting  · You have a change in your bowel movements  · You have questions or concerns about your condition or care  CARE AGREEMENT:   You have the right to help plan your care  Learn about your health condition and how it may be treated  Discuss treatment options with your healthcare providers to decide what care you want to receive  You always have the right to refuse treatment  The above information is an  only  It is not intended as medical advice for individual conditions or treatments  Talk to your doctor, nurse or pharmacist before following any medical regimen to see if it is safe and effective for you  © Copyright [address] Information is for End User's use only and may not be sold, redistributed or otherwise used for commercial purposes   All illustrations and images included in CareNotes® are the copyrighted property of [employer]  or [address] [place], [place] [address]

## 2020-12-30 NOTE — H&P
History and Physical -  [facility]  Giovany Tyler 50 y o  male MRN: [id number]    HPI: Giovany Tyler is a 50y o  year old male who presents for colonoscopy to evaluate constipation, upper abdominal pain  Also has history of adenomatous polyps  REVIEW OF SYSTEMS: Per the HPI, and otherwise unremarkable      Historical Information   Past Medical History:   Diagnosis Date    Anterolateral myocardial infarction [facility])     last assessed 10/30/17    Chest pain     Coronary artery disease     Glaucoma     Heart disease     Hypercholesterolemia     Hypertension     Myocardial infarction ([address] )     Uveitis     Visual impairment      Past Surgical History:   Procedure Laterality Date    APPENDECTOMY      CARDIAC CATHETERIZATION      CARDIAC SURGERY      CORONARY STENT PLACEMENT      EYE SURGERY      TONSILLECTOMY       Social History   Social History     Substance and Sexual Activity   Alcohol Use No     Social History     Substance and Sexual Activity   Drug Use Yes    Types: Marijuana     Social History     Tobacco Use   Smoking Status Former Smoker   Smokeless Tobacco Never Used     Family History   Problem Relation Age of Onset    Blindness Mother         retinitis pigmentosis    Heart attack Father         1st heart attack at age 28- had CABG in past    Diabetes Father     Blindness Maternal Aunt         retinitis pigmentosis    Dislocations Family     Heart disease Family     Hypertension Family     Mental illness Neg Hx     Substance Abuse Neg Hx        Meds/Allergies       Current Outpatient Medications:     aspirin (ECOTRIN LOW STRENGTH) 81 mg EC tablet    atorvastatin (LIPITOR) 40 mg tablet    Cholecalciferol (VITAMIN D3) 2000 units TABS    [employer] F[employer] GUMMY BEARS CHILDREN PO    dorzolamide (TRUSOPT) 2 % ophthalmic solution    fluorometholone (FML) 0 1 % ophthalmic suspension    lisinopril (ZESTRIL) 20 mg tablet    metoprolol succinate (TOPROL-XL) 50 mg 24 hr tablet    Sod Picosulfate-Mag Ox-Cit Acd (Clenpiq) 10-3 5-12 MG-GM -GM/160ML SOLN    meloxicam (MOBIC) 7 5 mg tablet    nitroglycerin (NITROSTAT) 0 4 mg SL tablet    Current Facility-Administered Medications:     sodium chloride 0 9 % infusion, 50 mL/hr, Intravenous, Continuous, 50 mL/hr at 12/30/20 1027    Allergies   Allergen Reactions    Penicillins Anaphylaxis    Azithromycin      Annotation - [id number]: Listed in old chart  Objective     /83   Pulse 92   Temp 98 7 °F (37 1 °C) (Temporal)   Resp 16   SpO2 98%     PHYSICAL EXAM    Gen: NAD AAOx3  CV: S1S2 RRR no m/r/g  CHEST: Clear b/l no c/r/w  ABD: soft, +BS NT/ND  EXT: no edema    ASSESSMENT/PLAN:  This is a 50y o  year old male here for colonoscopy, and he is stable and optimized for his procedure

## 2020-12-30 NOTE — PERIOPERATIVE NURSING NOTE
Patient complains of not feeling well  Complains of feeling cold  Blankets applied  Patient afebrile  Temperature 97 8 via temporal thermometer  Complains of stomach not feeling right  Abdomen soft to palpation  Passing flatus  Bowel sounds present  Dr Lion Boyd notified and in to examine patient  Dr Melissa Alfaro in to see patient regarding his BP  No new orders noted

## 2021-01-11 ENCOUNTER — OFFICE VISIT (OUTPATIENT)
Dept: FAMILY MEDICINE CLINIC | Facility: HOSPITAL | Age: 46
End: 2021-01-11
Payer: COMMERCIAL

## 2021-01-11 VITALS
HEART RATE: 84 BPM | BODY MASS INDEX: 26.22 KG/M2 | DIASTOLIC BLOOD PRESSURE: 80 MMHG | SYSTOLIC BLOOD PRESSURE: 130 MMHG | WEIGHT: 173 LBS | HEIGHT: 68 IN

## 2021-01-11 DIAGNOSIS — R10.12 LUQ PAIN: Primary | ICD-10-CM

## 2021-01-11 PROCEDURE — 3725F SCREEN DEPRESSION PERFORMED: CPT | Performed by: INTERNAL MEDICINE

## 2021-01-11 PROCEDURE — 99214 OFFICE O/P EST MOD 30 MIN: CPT | Performed by: INTERNAL MEDICINE

## 2021-01-11 PROCEDURE — 3008F BODY MASS INDEX DOCD: CPT | Performed by: PHYSICIAN ASSISTANT

## 2021-01-11 RX ORDER — PANTOPRAZOLE SODIUM 40 MG/1
40 TABLET, DELAYED RELEASE ORAL DAILY
Qty: 30 TABLET | Refills: 5 | Status: SHIPPED | OUTPATIENT
Start: 2021-01-11 | End: 2021-02-19 | Stop reason: SDUPTHER

## 2021-01-11 NOTE — PROGRESS NOTES
Assessment/Plan:       Diagnoses and all orders for this visit:    LUQ pain  -     FL UGI w/ air routine; Future  -     pantoprazole (PROTONIX) 40 mg tablet; Take 1 tablet (40 mg total) by mouth daily          All of the above diagnoses have been assessed  Additional COMMENTS/PLAN:  Patient be seen back in 4 weeks with attention to his symptoms on empiric Protonix as well as after an upper GI      Subjective:      Patient ID: Joleen Jaffe is a 50 y o  male  HPI     Has had left upper quadrant pain for approximately 1 month  Patient had CT scan as well as colonoscopy which were nondiagnostic  Did have problems with constipation  Uses fiber every day  No SOB  No pleuritic component  The following portions of the patient's history were revised and updated as appropriate: Problem list, allergies, med list, FH, SH, Past medical and surgical histories      Current Outpatient Medications   Medication Sig Dispense Refill    aspirin (ECOTRIN LOW STRENGTH) 81 mg EC tablet Take 81 mg by mouth daily      atorvastatin (LIPITOR) 40 mg tablet Take 1 tablet by mouth once daily 30 tablet 0    Cholecalciferol (VITAMIN D3) 2000 units TABS Take 1 tablet by mouth daily      [employer] FIBER GUMMY BEARS CHILDREN PO Take 2 each by mouth daily      dorzolamide (TRUSOPT) 2 % ophthalmic solution Administer 1 drop into the left eye daily      fluorometholone (FML) 0 1 % ophthalmic suspension Administer 1 drop into the left eye daily      lisinopril (ZESTRIL) 20 mg tablet Take 1 tablet by mouth once daily 90 tablet 3    meloxicam (MOBIC) 7 5 mg tablet TAKE 1 TABLET BY MOUTH TWICE DAILY AS NEEDED FOR MILD OR MODERATE PAIN      metoprolol succinate (TOPROL-XL) 50 mg 24 hr tablet Take 1 tablet (50 mg total) by mouth daily 30 tablet 11    nitroglycerin (NITROSTAT) 0 4 mg SL tablet Place 1 tablet (0 4 mg total) under the tongue every 5 (five) minutes as needed for chest pain 90 tablet 0    pantoprazole (PROTONIX) 40 mg tablet Take 1 tablet (40 mg total) by mouth daily 30 tablet 5     No current facility-administered medications for this visit  Review of Systems   All other systems reviewed and are negative  Objective:    /80 (BP Location: Left arm, Patient Position: Sitting, Cuff Size: Standard)   Pulse 84   Ht 5' 8" (1 727 m)   Wt 78 5 kg (173 lb)   BMI 26 30 kg/m²     BP Readings from Last 3 Encounters:   01/11/21 130/80   12/30/20 (!) 152/102   12/13/20 129/93                  Wt Readings from Last 3 Encounters:   01/11/21 78 5 kg (173 lb)   12/23/20 76 2 kg (168 lb)   12/13/20 78 9 kg (173 lb 15 1 oz)         Physical Exam  Cardiovascular:      Rate and Rhythm: Normal rate and regular rhythm  Pulmonary:      Effort: Pulmonary effort is normal       Breath sounds: Normal breath sounds  Abdominal:      General: Abdomen is flat  Bowel sounds are normal       Palpations: Abdomen is soft  Tenderness: There is no abdominal tenderness  Musculoskeletal:      Right lower leg: No edema  Left lower leg: No edema  Neurological:      Mental Status: He is alert  No visits with results within 2 Week(s) from this visit     Latest known visit with results is:   Admission on 12/13/2020, Discharged on 12/13/2020   Component Date Value Ref Range Status    WBC 12/13/2020 4 92  4 31 - 10 16 Thousand/uL Final    RBC 12/13/2020 5 24  3 88 - 5 62 Million/uL Final    Hemoglobin 12/13/2020 15 5  12 0 - 17 0 g/dL Final    Hematocrit 12/13/2020 46 4  36 5 - 49 3 % Final    MCV 12/13/2020 89  82 - 98 fL Final    MCH 12/13/2020 29 6  26 8 - 34 3 pg Final    MCHC 12/13/2020 33 4  31 4 - 37 4 g/dL Final    RDW 12/13/2020 11 6  11 6 - 15 1 % Final    MPV 12/13/2020 9 4  8 9 - 12 7 fL Final    Platelets 19/04/1333 232  149 - 390 Thousands/uL Final    nRBC 12/13/2020 0  /100 WBCs Final    Neutrophils Relative 12/13/2020 58  43 - 75 % Final    Immat GRANS % 12/13/2020 0  0 - 2 % Final    Lymphocytes Relative 12/13/2020 33  14 - 44 % Final    Monocytes Relative 12/13/2020 8  4 - 12 % Final    Eosinophils Relative 12/13/2020 1  0 - 6 % Final    Basophils Relative 12/13/2020 0  0 - 1 % Final    Neutrophils Absolute 12/13/2020 2 86  1 85 - 7 62 Thousands/µL Final    Immature Grans Absolute 12/13/2020 0 01  0 00 - 0 20 Thousand/uL Final    Lymphocytes Absolute 12/13/2020 1 60  0 60 - 4 47 Thousands/µL Final    Monocytes Absolute 12/13/2020 0 38  0 17 - 1 22 Thousand/µL Final    Eosinophils Absolute 12/13/2020 0 05  0 00 - 0 61 Thousand/µL Final    Basophils Absolute 12/13/2020 0 02  0 00 - 0 10 Thousands/µL Final    Sodium 12/13/2020 137  136 - 145 mmol/L Final    Potassium 12/13/2020 4 2  3 5 - 5 3 mmol/L Final    Chloride 12/13/2020 103  100 - 108 mmol/L Final    CO2 12/13/2020 29  21 - 32 mmol/L Final    ANION GAP 12/13/2020 5  4 - 13 mmol/L Final    BUN 12/13/2020 13  5 - 25 mg/dL Final    Creatinine 12/13/2020 0 99  0 60 - 1 30 mg/dL Final    Standardized to IDMS reference method    Glucose 12/13/2020 92  65 - 140 mg/dL Final    If the patient is fasting, the ADA then defines impaired fasting glucose as > 100 mg/dL and diabetes as > or equal to 123 mg/dL  Specimen collection should occur prior to Sulfasalazine administration due to the potential for falsely depressed results  Specimen collection should occur prior to Sulfapyridine administration due to the potential for falsely elevated results   Calcium 12/13/2020 9 1  8 3 - 10 1 mg/dL Final    AST 12/13/2020 25  5 - 45 U/L Final    Specimen collection should occur prior to Sulfasalazine administration due to the potential for falsely depressed results   ALT 12/13/2020 42  12 - 78 U/L Final    Specimen collection should occur prior to Sulfasalazine administration due to the potential for falsely depressed results       Alkaline Phosphatase 12/13/2020 56  46 - 116 U/L Final    Total Protein 12/13/2020 6 6  6 4 - 8 2 g/dL Final    Albumin 12/13/2020 3 8  3 5 - 5 0 g/dL Final    Total Bilirubin 12/13/2020 0 70  0 20 - 1 00 mg/dL Final    Use of this assay is not recommended for patients undergoing treatment with eltrombopag due to the potential for falsely elevated results   eGFR 12/13/2020 90  ml/min/1 73sq m Final    Lipase 12/13/2020 117  73 - 393 u/L Final    Troponin I 12/13/2020 <0 02  <=0 04 ng/mL Final    3Autoval[employer] override  [employer] Chemistry analyzer 99% cutoff is > 0 04 ng/mL in network labs     o cTnI 99% cutoff is useful only when applied to patients in the clinical setting of myocardial ischemia   o cTnI 99% cutoff should be interpreted in the context of clinical history, ECG findings and possibly cardiac imaging to establish correct diagnosis  o cTnI 99% cutoff may be suggestive but clearly not indicative of a coronary event without the clinical setting of myocardial ischemia   Ventricular Rate 12/13/2020 72  BPM Final    Atrial Rate 12/13/2020 72  BPM Final    IA Interval 12/13/2020 148  ms Final    QRSD Interval 12/13/2020 90  ms Final    QT Interval 12/13/2020 340  ms Final    QTC Interval 12/13/2020 372  ms Final    P Axis 12/13/2020 45  degrees Final    QRS Axis 12/13/2020 99  degrees Final    T Wave Axis 12/13/2020 59  degrees Final         Rosie Bryson MD    Some or all of this note was generated with a voice recognition dictation system and therefore my contain grammatical or spelling errors

## 2021-01-14 ENCOUNTER — TELEPHONE (OUTPATIENT)
Dept: FAMILY MEDICINE CLINIC | Facility: HOSPITAL | Age: 46
End: 2021-01-14

## 2021-01-14 ENCOUNTER — OFFICE VISIT (OUTPATIENT)
Dept: FAMILY MEDICINE CLINIC | Facility: HOSPITAL | Age: 46
End: 2021-01-14
Payer: COMMERCIAL

## 2021-01-14 VITALS
WEIGHT: 174 LBS | SYSTOLIC BLOOD PRESSURE: 110 MMHG | DIASTOLIC BLOOD PRESSURE: 70 MMHG | TEMPERATURE: 98 F | BODY MASS INDEX: 26.46 KG/M2

## 2021-01-14 DIAGNOSIS — E78.00 HYPERCHOLESTEROLEMIA: ICD-10-CM

## 2021-01-14 DIAGNOSIS — L50.9 HIVES: Primary | ICD-10-CM

## 2021-01-14 PROCEDURE — 99213 OFFICE O/P EST LOW 20 MIN: CPT | Performed by: PHYSICIAN ASSISTANT

## 2021-01-14 PROCEDURE — 1036F TOBACCO NON-USER: CPT | Performed by: PHYSICIAN ASSISTANT

## 2021-01-14 PROCEDURE — 3078F DIAST BP <80 MM HG: CPT | Performed by: PHYSICIAN ASSISTANT

## 2021-01-14 PROCEDURE — 3074F SYST BP LT 130 MM HG: CPT | Performed by: PHYSICIAN ASSISTANT

## 2021-01-14 RX ORDER — ATORVASTATIN CALCIUM 40 MG/1
TABLET, FILM COATED ORAL
Qty: 30 TABLET | Refills: 0 | Status: SHIPPED | OUTPATIENT
Start: 2021-01-14 | End: 2021-02-16

## 2021-01-14 NOTE — PROGRESS NOTES
Assessment/Plan:           Problem List Items Addressed This Visit     None      Visit Diagnoses     Hives    -  Primary    Relevant Medications    fluocinonide (LIDEX) 0 05 % cream            Subjective:      Patient ID: Joleen Jaffe is a 50 y o  male  C/o rash affecting  Saw Dr Jonathan Escobar, past Monday, was given meds  For GERD  Yesterday, after work, around 5:30 pm, while showering, felt a burning sensation, and noticed rash forming prior to shower last night  Also having left, upper side/quadrant pain  Seeing GI for evaluation  Started fiber Gummies about 1 month ago  Denies having extra stress, or signs of infection  Denies using any new soaps, detergents or shampoo  Denies increased alcohol use  Review of Systems   Constitutional: Negative for chills, diaphoresis, fatigue and fever  Respiratory: Negative for cough, chest tightness and shortness of breath  Gastrointestinal: Negative for abdominal pain, constipation, diarrhea, nausea and vomiting  Objective:      /70 (BP Location: Left arm, Patient Position: Sitting, Cuff Size: Standard)   Temp 98 °F (36 7 °C)   Wt 78 9 kg (174 lb)   BMI 26 46 kg/m²          Physical Exam  Vitals signs reviewed  Pulmonary:      Effort: Pulmonary effort is normal  No respiratory distress  Musculoskeletal: Normal range of motion  General: No swelling, tenderness, deformity or signs of injury  Right lower leg: No edema  Left lower leg: No edema  Skin:     Findings: Erythema and rash present  Comments: Erythematous rash noted, both sides of body, under both axilla, and entire back  No raised lesions, or papules noted  Rash is flat  Neurological:      General: No focal deficit present  Mental Status: He is alert  Cranial Nerves: No cranial nerve deficit  Sensory: No sensory deficit  Motor: No weakness        Coordination: Coordination normal    Psychiatric:         Mood and Affect: Mood normal          Behavior: Behavior normal          Thought Content:  Thought content normal          Judgment: Judgment normal

## 2021-01-14 NOTE — PATIENT INSTRUCTIONS
Stop GERD meds  Start Zyrtec or Claritin  Continue vitamin D 2000 IU daily  And try Lidex cream for burning and itching  Try Co Q 10-  200 mg

## 2021-01-18 ENCOUNTER — HOSPITAL ENCOUNTER (OUTPATIENT)
Dept: RADIOLOGY | Facility: HOSPITAL | Age: 46
Discharge: HOME | End: 2021-01-18
Attending: INTERNAL MEDICINE
Payer: COMMERCIAL

## 2021-01-18 DIAGNOSIS — R10.12 LUQ PAIN: ICD-10-CM

## 2021-01-18 PROCEDURE — 74246 X-RAY XM UPR GI TRC 2CNTRST: CPT

## 2021-02-12 DIAGNOSIS — E78.00 HYPERCHOLESTEROLEMIA: ICD-10-CM

## 2021-02-16 RX ORDER — ATORVASTATIN CALCIUM 40 MG/1
TABLET, FILM COATED ORAL
Qty: 30 TABLET | Refills: 0 | Status: SHIPPED | OUTPATIENT
Start: 2021-02-16 | End: 2021-02-19

## 2021-02-19 ENCOUNTER — OFFICE VISIT (OUTPATIENT)
Dept: FAMILY MEDICINE CLINIC | Facility: HOSPITAL | Age: 46
End: 2021-02-19
Payer: COMMERCIAL

## 2021-02-19 VITALS
HEIGHT: 68 IN | OXYGEN SATURATION: 98 % | WEIGHT: 175.6 LBS | BODY MASS INDEX: 26.61 KG/M2 | DIASTOLIC BLOOD PRESSURE: 82 MMHG | HEART RATE: 92 BPM | SYSTOLIC BLOOD PRESSURE: 128 MMHG

## 2021-02-19 DIAGNOSIS — H20.9 UVEITIS: ICD-10-CM

## 2021-02-19 DIAGNOSIS — R10.12 LUQ PAIN: Primary | ICD-10-CM

## 2021-02-19 DIAGNOSIS — H40.9 GLAUCOMA OF BOTH EYES, UNSPECIFIED GLAUCOMA TYPE: ICD-10-CM

## 2021-02-19 DIAGNOSIS — E78.00 HYPERCHOLESTEROLEMIA: ICD-10-CM

## 2021-02-19 DIAGNOSIS — R21 RASH: ICD-10-CM

## 2021-02-19 DIAGNOSIS — I10 ESSENTIAL HYPERTENSION: ICD-10-CM

## 2021-02-19 PROBLEM — K63.5 COLON POLYPS: Status: ACTIVE | Noted: 2021-02-19

## 2021-02-19 PROCEDURE — 3008F BODY MASS INDEX DOCD: CPT | Performed by: INTERNAL MEDICINE

## 2021-02-19 PROCEDURE — 1036F TOBACCO NON-USER: CPT | Performed by: INTERNAL MEDICINE

## 2021-02-19 PROCEDURE — 3725F SCREEN DEPRESSION PERFORMED: CPT | Performed by: INTERNAL MEDICINE

## 2021-02-19 PROCEDURE — 99214 OFFICE O/P EST MOD 30 MIN: CPT | Performed by: INTERNAL MEDICINE

## 2021-02-19 RX ORDER — PANTOPRAZOLE SODIUM 40 MG/1
40 TABLET, DELAYED RELEASE ORAL DAILY
Qty: 90 TABLET | Refills: 3 | Status: SHIPPED | OUTPATIENT
Start: 2021-02-19 | End: 2021-07-15 | Stop reason: SDUPTHER

## 2021-02-19 RX ORDER — ATORVASTATIN CALCIUM 40 MG/1
40 TABLET, FILM COATED ORAL DAILY
Qty: 90 TABLET | Refills: 3 | Status: SHIPPED | OUTPATIENT
Start: 2021-02-19 | End: 2021-03-12

## 2021-02-19 RX ORDER — METOPROLOL SUCCINATE 50 MG/1
50 TABLET, EXTENDED RELEASE ORAL DAILY
Qty: 90 TABLET | Refills: 3 | Status: SHIPPED | OUTPATIENT
Start: 2021-02-19 | End: 2021-12-22

## 2021-02-19 NOTE — PROGRESS NOTES
Assessment/Plan:             Problem List Items Addressed This Visit        Cardiovascular and Mediastinum    HTN (hypertension)     bp is now improved         Relevant Medications    metoprolol succinate (TOPROL-XL) 50 mg 24 hr tablet       Musculoskeletal and Integument    Rash     Started rash 1/14- ws seen  By New MCFADDEN- given cream with gradual improvement  Initially he was concerned about pantoprazole as allergic reaction- restarted taking it- now small on middle of back a few days ago and nwo resolved            Other    LUQ pain - Primary     Ongoing issues since Dec 2020- has ongoing discomfort in area  Had ct in December then colonoscopy with dr Liat Mcdaniel- at times  Has tingling and other times has pressure- some improvement with pantoprazole     had upper gi- - has small hiatal hernia and some mild reflux  Starts work at 3 am- muches throughout day- has apple and banana then leftover meal for lunch around 9 am  Then has pm meal a 5 pm- bed at 7 pm         Relevant Medications    pantoprazole (PROTONIX) 40 mg tablet      Other Visit Diagnoses     Hypercholesterolemia        Relevant Medications    atorvastatin (LIPITOR) 40 mg tablet            Subjective:      Patient ID: Nasima Sherman is a 50 y o  male    1  Ongoing luq pain- somewhat better since taking pantoprazole daily- if not improving will have him see   /shon for egd  2  Rash- fine dry patches on upper thorax-? eczema      The following portions of the patient's history were reviewed and updated as appropriate: allergies, current medications and problem list      Review of Systems   Constitutional: Negative for chills and fever  HENT: Negative for congestion and sinus pressure  Eyes:        Left eye loss of of vision with scarring- has had glaucoma  From eye drops   Respiratory: Negative for shortness of breath  Gastrointestinal: Positive for abdominal pain     All other systems reviewed and are negative  Objective:      Current Outpatient Medications:     aspirin (ECOTRIN LOW STRENGTH) 81 mg EC tablet, Take 81 mg by mouth daily, Disp: , Rfl:     atorvastatin (LIPITOR) 40 mg tablet, Take 1 tablet (40 mg total) by mouth daily, Disp: 90 tablet, Rfl: 3    Cholecalciferol (VITAMIN D3) 2000 units TABS, Take 1 tablet by mouth daily, Disp: , Rfl:     [employer] F[employer]ILDREN PO, Take 2 each by mouth daily, Disp: , Rfl:     dorzolamide (TRUSOPT) 2 % ophthalmic solution, Administer 1 drop into the left eye daily, Disp: , Rfl:     fluocinonide (LIDEX) 0 05 % cream, Apply topically 2 (two) times a day, Disp: 30 g, Rfl: 0    fluorometholone (FML) 0 1 % ophthalmic suspension, Administer 1 drop into the left eye daily, Disp: , Rfl:     lisinopril (ZESTRIL) 20 mg tablet, Take 1 tablet by mouth once daily, Disp: 90 tablet, Rfl: 3    metoprolol succinate (TOPROL-XL) 50 mg 24 hr tablet, Take 1 tablet (50 mg total) by mouth daily, Disp: 90 tablet, Rfl: 3    nitroglycerin (NITROSTAT) 0 4 mg SL tablet, Place 1 tablet (0 4 mg total) under the tongue every 5 (five) minutes as needed for chest pain, Disp: 90 tablet, Rfl: 0    pantoprazole (PROTONIX) 40 mg tablet, Take 1 tablet (40 mg total) by mouth daily, Disp: 90 tablet, Rfl: 3    Blood pressure 128/82, pulse 92, height 5' 8" (1 727 m), weight 79 7 kg (175 lb 9 6 oz), SpO2 98 %  Physical Exam  Vitals signs and nursing note reviewed  Constitutional:       General: He is not in acute distress  HENT:      Right Ear: There is no impacted cerumen  Left Ear: There is no impacted cerumen  Mouth/Throat:      Pharynx: No oropharyngeal exudate or posterior oropharyngeal erythema  Eyes:      General: No scleral icterus  Neck:      Musculoskeletal: No neck rigidity or muscular tenderness  Cardiovascular:      Rate and Rhythm: Normal rate and regular rhythm  Heart sounds: No murmur  Pulmonary:      Effort: No respiratory distress  Breath sounds: No stridor  No wheezing  Abdominal:      General: Abdomen is flat  There is no distension  Palpations: There is no mass  Tenderness: There is abdominal tenderness  Comments: Mild luq tenderness   Musculoskeletal:         General: No deformity or signs of injury  Skin:     Coloration: Skin is not jaundiced or pale  Neurological:      Mental Status: He is alert  Psychiatric:         Mood and Affect: Mood normal          Thought Content:  Thought content normal

## 2021-02-19 NOTE — ASSESSMENT & PLAN NOTE
Started rash 1/14- ws seen  By Jimmy MCFADDEN- given cream with gradual improvement   Initially he was concerned about pantoprazole as allergic reaction- restarted taking it- now small on middle of back a few days ago and nwo resolved

## 2021-02-19 NOTE — ASSESSMENT & PLAN NOTE
Ongoing issues since Dec 2020- has ongoing discomfort in area  Had ct in December then colonoscopy with dr Julia Choi- at times  Has tingling and other times has pressure- some improvement with pantoprazole     had upper gi- - has small hiatal hernia and some mild reflux     Starts work at 3 am- muches throughout day- has apple and banana then leftover meal for lunch around 9 am  Then has pm meal a 5 pm- bed at 7 pm

## 2021-02-28 LAB
CHOLEST SERPL-MCNC: 129 MG/DL (ref 100–199)
HDLC SERPL-MCNC: 42 MG/DL
LDLC SERPL CALC-MCNC: 74 MG/DL (ref 0–99)
LDLC/HDLC SERPL: 1.8 RATIO (ref 0–3.6)
SL AMB VLDL CHOLESTEROL CALC: 13 MG/DL (ref 5–40)
TRIGL SERPL-MCNC: 62 MG/DL (ref 0–149)

## 2021-03-12 DIAGNOSIS — E78.00 HYPERCHOLESTEROLEMIA: ICD-10-CM

## 2021-03-12 RX ORDER — ATORVASTATIN CALCIUM 40 MG/1
TABLET, FILM COATED ORAL
Qty: 30 TABLET | Refills: 0 | Status: SHIPPED | OUTPATIENT
Start: 2021-03-12 | End: 2021-05-05

## 2021-04-19 ENCOUNTER — OFFICE VISIT (OUTPATIENT)
Dept: FAMILY MEDICINE CLINIC | Facility: HOSPITAL | Age: 46
End: 2021-04-19
Payer: COMMERCIAL

## 2021-04-19 VITALS
HEART RATE: 74 BPM | SYSTOLIC BLOOD PRESSURE: 112 MMHG | OXYGEN SATURATION: 96 % | HEIGHT: 68 IN | DIASTOLIC BLOOD PRESSURE: 70 MMHG | WEIGHT: 174 LBS | BODY MASS INDEX: 26.37 KG/M2

## 2021-04-19 DIAGNOSIS — R20.8 SENSATION OF FOREIGN BODY IN FINGER: ICD-10-CM

## 2021-04-19 DIAGNOSIS — F19.10 SUBSTANCE ABUSE (HCC): ICD-10-CM

## 2021-04-19 DIAGNOSIS — E78.00 HYPERCHOLESTEREMIA: ICD-10-CM

## 2021-04-19 DIAGNOSIS — I10 ESSENTIAL HYPERTENSION: ICD-10-CM

## 2021-04-19 DIAGNOSIS — R10.12 LUQ PAIN: Primary | ICD-10-CM

## 2021-04-19 PROCEDURE — 1036F TOBACCO NON-USER: CPT | Performed by: INTERNAL MEDICINE

## 2021-04-19 PROCEDURE — 3008F BODY MASS INDEX DOCD: CPT | Performed by: INTERNAL MEDICINE

## 2021-04-19 PROCEDURE — 3074F SYST BP LT 130 MM HG: CPT | Performed by: INTERNAL MEDICINE

## 2021-04-19 PROCEDURE — 3078F DIAST BP <80 MM HG: CPT | Performed by: INTERNAL MEDICINE

## 2021-04-19 PROCEDURE — 3725F SCREEN DEPRESSION PERFORMED: CPT | Performed by: INTERNAL MEDICINE

## 2021-04-19 PROCEDURE — 99214 OFFICE O/P EST MOD 30 MIN: CPT | Performed by: INTERNAL MEDICINE

## 2021-04-19 NOTE — ASSESSMENT & PLAN NOTE
Uses marijuana about once a week-helps release some anxiety issues   unable to get medical marijuana card as he has a carry permit

## 2021-04-19 NOTE — PROGRESS NOTES
Assessment/Plan:             Problem List Items Addressed This Visit        Cardiovascular and Mediastinum    HTN (hypertension)       Other    Substance abuse ([address] )     Uses marijuana about once a week-helps release some anxiety issues  unable to get medical marijuana card as he has a carry permit         Hypercholesteremia    LUQ pain - Primary     2 months ago would have it for days - now gets it for about 2-3 hours when it come- not associated with specific foods         Relevant Orders    Celiac Disease Antibody Profile    Food Specific IgG Allergy (Adult) Panel    Ambulatory referral to Gastroenterology      Other Visit Diagnoses     Sensation of foreign body in finger        Relevant Orders    XR finger right second digit-index            Subjective:      Patient ID: Zo Corral is a 50 y o  male    luq pain- had colonoscopy after episodes in December- now intermittent and less frequent but still there  No change in bowels- using gummy stool softeners     Right index finger soreness- had cat bite there a year ago- still tenderness- was an old cat- took antibiotics and red streaking went away but still feels symptomsAlso had finger sh mashed in machine a few months later      The following portions of the patient's history were reviewed and updated as appropriate: allergies, current medications and problem list      Review of Systems   Constitutional: Negative for fatigue and fever  Gastrointestinal: Positive for abdominal pain  Negative for constipation and diarrhea  All other systems reviewed and are negative          Objective:      Current Outpatient Medications:     aspirin (ECOTRIN LOW STRENGTH) 81 mg EC tablet, Take 81 mg by mouth daily, Disp: , Rfl:     atorvastatin (LIPITOR) 40 mg tablet, Take 1 tablet by mouth once daily, Disp: 30 tablet, Rfl: 0    Cholecalciferol (VITAMIN D3) 2000 units TABS, Take 1 tablet by mouth daily, Disp: , Rfl:     [employer]ILDR[employer] PO, Take 2 each by mouth daily, Disp: , Rfl:     dorzolamide (TRUSOPT) 2 % ophthalmic solution, Administer 1 drop into the left eye daily, Disp: , Rfl:     fluocinonide (LIDEX) 0 05 % cream, Apply topically 2 (two) times a day, Disp: 30 g, Rfl: 0    fluorometholone (FML) 0 1 % ophthalmic suspension, Administer 1 drop into the left eye daily, Disp: , Rfl:     lisinopril (ZESTRIL) 20 mg tablet, Take 1 tablet by mouth once daily, Disp: 90 tablet, Rfl: 3    metoprolol succinate (TOPROL-XL) 50 mg 24 hr tablet, Take 1 tablet (50 mg total) by mouth daily, Disp: 90 tablet, Rfl: 3    nitroglycerin (NITROSTAT) 0 4 mg SL tablet, Place 1 tablet (0 4 mg total) under the tongue every 5 (five) minutes as needed for chest pain, Disp: 90 tablet, Rfl: 0    pantoprazole (PROTONIX) 40 mg tablet, Take 1 tablet (40 mg total) by mouth daily, Disp: 90 tablet, Rfl: 3    Blood pressure 112/70, pulse 74, height 5' 8" (1 727 m), weight 78 9 kg (174 lb), SpO2 96 %  Physical Exam  Vitals signs and nursing note reviewed  Constitutional:       General: He is not in acute distress  Appearance: He is not ill-appearing or diaphoretic  HENT:      Right Ear: Tympanic membrane normal       Left Ear: Tympanic membrane normal       Nose: No congestion or rhinorrhea  Mouth/Throat:      Pharynx: No posterior oropharyngeal erythema  Eyes:      General: No scleral icterus  Right eye: No discharge  Left eye: No discharge  Cardiovascular:      Rate and Rhythm: Normal rate and regular rhythm  Heart sounds: No murmur  Pulmonary:      Effort: Pulmonary effort is normal       Breath sounds: Normal breath sounds  Chest:      Chest wall: No tenderness  Abdominal:      General: Abdomen is flat  There is no distension  Palpations: Abdomen is soft  Tenderness: There is no abdominal tenderness  Comments: luq tenderness is minimal today   Musculoskeletal:         General: No swelling or tenderness     Lymphadenopathy: Cervical: No cervical adenopathy  Skin:     General: Skin is warm  Coloration: Skin is not jaundiced  Psychiatric:         Mood and Affect: Mood normal          Thought Content:  Thought content normal

## 2021-04-19 NOTE — ASSESSMENT & PLAN NOTE
2 months ago would have it for days - now gets it for about 2-3 hours when it come- not associated with specific foods

## 2021-05-03 DIAGNOSIS — E78.00 HYPERCHOLESTEROLEMIA: ICD-10-CM

## 2021-05-04 ENCOUNTER — HOSPITAL ENCOUNTER (OUTPATIENT)
Dept: RADIOLOGY | Facility: HOSPITAL | Age: 46
Discharge: HOME | End: 2021-05-04
Attending: INTERNAL MEDICINE
Payer: COMMERCIAL

## 2021-05-04 DIAGNOSIS — R20.8 SENSATION OF FOREIGN BODY IN FINGER: ICD-10-CM

## 2021-05-04 PROCEDURE — 73140 X-RAY EXAM OF FINGER(S): CPT

## 2021-05-05 RX ORDER — ATORVASTATIN CALCIUM 40 MG/1
TABLET, FILM COATED ORAL
Qty: 30 TABLET | Refills: 0 | Status: SHIPPED | OUTPATIENT
Start: 2021-05-05 | End: 2021-05-06 | Stop reason: SDUPTHER

## 2021-05-06 DIAGNOSIS — E78.00 HYPERCHOLESTEROLEMIA: ICD-10-CM

## 2021-05-07 RX ORDER — ATORVASTATIN CALCIUM 40 MG/1
40 TABLET, FILM COATED ORAL DAILY
Qty: 90 TABLET | Refills: 3 | Status: SHIPPED | OUTPATIENT
Start: 2021-05-07 | End: 2021-06-08

## 2021-05-10 ENCOUNTER — OFFICE VISIT (OUTPATIENT)
Dept: FAMILY MEDICINE CLINIC | Facility: HOSPITAL | Age: 46
End: 2021-05-10
Payer: COMMERCIAL

## 2021-05-10 VITALS
HEART RATE: 62 BPM | DIASTOLIC BLOOD PRESSURE: 80 MMHG | WEIGHT: 174 LBS | SYSTOLIC BLOOD PRESSURE: 136 MMHG | HEIGHT: 68 IN | BODY MASS INDEX: 26.37 KG/M2 | TEMPERATURE: 97.4 F | OXYGEN SATURATION: 99 %

## 2021-05-10 DIAGNOSIS — J32.9 SINUSITIS, UNSPECIFIED CHRONICITY, UNSPECIFIED LOCATION: Primary | ICD-10-CM

## 2021-05-10 DIAGNOSIS — H66.003 ACUTE SUPPURATIVE OTITIS MEDIA OF BOTH EARS WITHOUT SPONTANEOUS RUPTURE OF TYMPANIC MEMBRANES, RECURRENCE NOT SPECIFIED: ICD-10-CM

## 2021-05-10 PROCEDURE — 3079F DIAST BP 80-89 MM HG: CPT | Performed by: PHYSICIAN ASSISTANT

## 2021-05-10 PROCEDURE — 99213 OFFICE O/P EST LOW 20 MIN: CPT | Performed by: PHYSICIAN ASSISTANT

## 2021-05-10 PROCEDURE — 3075F SYST BP GE 130 - 139MM HG: CPT | Performed by: PHYSICIAN ASSISTANT

## 2021-05-10 PROCEDURE — 1036F TOBACCO NON-USER: CPT | Performed by: PHYSICIAN ASSISTANT

## 2021-05-10 PROCEDURE — 3008F BODY MASS INDEX DOCD: CPT | Performed by: PHYSICIAN ASSISTANT

## 2021-05-10 RX ORDER — CEFUROXIME AXETIL 250 MG/1
250 TABLET ORAL EVERY 12 HOURS SCHEDULED
Qty: 20 TABLET | Refills: 0 | Status: SHIPPED | OUTPATIENT
Start: 2021-05-10 | End: 2021-05-20

## 2021-05-10 RX ORDER — FLUTICASONE PROPIONATE 50 MCG
1 SPRAY, SUSPENSION (ML) NASAL DAILY
Qty: 1 BOTTLE | Refills: 3 | Status: SHIPPED | OUTPATIENT
Start: 2021-05-10

## 2021-05-10 RX ORDER — DORZOLAMIDE HYDROCHLORIDE AND TIMOLOL MALEATE 20; 5 MG/ML; MG/ML
SOLUTION/ DROPS OPHTHALMIC
COMMUNITY
Start: 2021-04-17

## 2021-05-10 NOTE — PROGRESS NOTES
Assessment/Plan:           Problem List Items Addressed This Visit     None      Visit Diagnoses     Sinusitis, unspecified chronicity, unspecified location    -  Primary    Relevant Medications    fluticasone (FLONASE) 50 mcg/act nasal spray    Acute suppurative otitis media of both ears without spontaneous rupture of tympanic membranes, recurrence not specified        Relevant Medications    fluticasone (FLONASE) 50 mcg/act nasal spray    cefuroxime (CEFTIN) 250 mg tablet            Subjective:      Patient ID: Michel Owen is a 50 y o  male  C/o neck pain, radiating to throat since yesterday  Today felt  ear ache with both ears  No sore throat, but feels a pressure neck, throat, back of head and teeth  Used nasal spray today  Review of Systems   Constitutional: Negative for chills, diaphoresis, fatigue and fever  HENT: Positive for ear pain  Negative for congestion, rhinorrhea, sinus pressure, sinus pain and sore throat  Respiratory: Negative for cough, chest tightness and shortness of breath  Neurological: Positive for headaches  Negative for dizziness and light-headedness  Objective:      /80   Pulse 62   Ht 5' 8" (1 727 m)   Wt 78 9 kg (174 lb)   SpO2 99%   BMI 26 46 kg/m²          Physical Exam  Vitals signs and nursing note reviewed  Constitutional:       General: He is not in acute distress  Appearance: Normal appearance  He is not ill-appearing, toxic-appearing or diaphoretic  HENT:      Head: Normocephalic and atraumatic  Right Ear: Ear canal and external ear normal  There is no impacted cerumen  Left Ear: Ear canal and external ear normal  There is no impacted cerumen  Ears:      Comments: TM bulging and erythematous bilaterally, congestion noted in middle ears  Nose: Congestion present  No rhinorrhea  Comments: Turbinates inflamed, bilaterally  Mouth/Throat:      Mouth: Mucous membranes are dry        Pharynx: No oropharyngeal exudate or posterior oropharyngeal erythema  Pulmonary:      Effort: Pulmonary effort is normal  No respiratory distress  Breath sounds: Normal breath sounds  No stridor  No wheezing, rhonchi or rales  Chest:      Chest wall: No tenderness  Skin:     Capillary Refill: Capillary refill takes 2 to 3 seconds  Neurological:      General: No focal deficit present  Mental Status: He is alert and oriented to person, place, and time  Cranial Nerves: No cranial nerve deficit  Sensory: No sensory deficit  Motor: No weakness        Coordination: Coordination normal

## 2021-05-13 LAB
ENDOMYSIUM IGA SER QL: NEGATIVE
GLIADIN PEPTIDE IGA SER-ACNC: 3 UNITS (ref 0–19)
GLIADIN PEPTIDE IGG SER-ACNC: 2 UNITS (ref 0–19)
IGA SERPL-MCNC: 82 MG/DL (ref 90–386)
TTG IGA SER-ACNC: <2 U/ML (ref 0–3)
TTG IGG SER-ACNC: 9 U/ML (ref 0–5)

## 2021-05-16 LAB
BASOPHILS # BLD AUTO: 0 X10E3/UL (ref 0–0.2)
BASOPHILS NFR BLD AUTO: 0 %
CLAM IGE QN: <0.1 KU/L
CODFISH IGE QN: <0.1 KU/L
CORN IGE QN: 0.15 KU/L
COW MILK IGE QN: 0.12 KU/L
EGG WHITE IGE QN: <0.1 KU/L
EOSINOPHIL # BLD AUTO: 0.1 X10E3/UL (ref 0–0.4)
EOSINOPHIL NFR BLD AUTO: 1 %
ERYTHROCYTE [DISTWIDTH] IN BLOOD BY AUTOMATED COUNT: 11.8 % (ref 11.6–15.4)
HCT VFR BLD AUTO: 45.8 % (ref 37.5–51)
HGB BLD-MCNC: 15 G/DL (ref 13–17.7)
IMM GRANULOCYTES # BLD: 0 X10E3/UL (ref 0–0.1)
IMM GRANULOCYTES NFR BLD: 0 %
LYMPHOCYTES # BLD AUTO: 2 X10E3/UL (ref 0.7–3.1)
LYMPHOCYTES NFR BLD AUTO: 29 %
Lab: ABNORMAL
MCH RBC QN AUTO: 28.9 PG (ref 26.6–33)
MCHC RBC AUTO-ENTMCNC: 32.8 G/DL (ref 31.5–35.7)
MCV RBC AUTO: 88 FL (ref 79–97)
MONOCYTES # BLD AUTO: 0.5 X10E3/UL (ref 0.1–0.9)
MONOCYTES NFR BLD AUTO: 8 %
NEUTROPHILS # BLD AUTO: 4.1 X10E3/UL (ref 1.4–7)
NEUTROPHILS NFR BLD AUTO: 62 %
PEANUT IGE QN: 0.17 KU/L
PLATELET # BLD AUTO: 280 X10E3/UL (ref 150–450)
RBC # BLD AUTO: 5.19 X10E6/UL (ref 4.14–5.8)
SCALLOP IGE QN: 0.15 KU/L
SESAME SEED IGE QN: 0.22 KU/L
SHRIMP IGE QN: 0.23 KU/L
SOYBEAN IGE QN: 0.15 KU/L
WALNUT IGE QN: 0.12 KU/L
WBC # BLD AUTO: 6.7 X10E3/UL (ref 3.4–10.8)
WHEAT IGE QN: 0.22 KU/L

## 2021-06-04 DIAGNOSIS — E78.00 HYPERCHOLESTEROLEMIA: ICD-10-CM

## 2021-06-08 RX ORDER — ATORVASTATIN CALCIUM 40 MG/1
TABLET, FILM COATED ORAL
Qty: 30 TABLET | Refills: 0 | Status: SHIPPED | OUTPATIENT
Start: 2021-06-08 | End: 2021-06-30 | Stop reason: SDUPTHER

## 2021-06-09 DIAGNOSIS — I10 ESSENTIAL HYPERTENSION: ICD-10-CM

## 2021-06-09 RX ORDER — LISINOPRIL 20 MG/1
20 TABLET ORAL DAILY
Qty: 90 TABLET | Refills: 3 | Status: SHIPPED | OUTPATIENT
Start: 2021-06-09 | End: 2022-07-04

## 2021-06-30 ENCOUNTER — OFFICE VISIT (OUTPATIENT)
Dept: CARDIOLOGY CLINIC | Facility: CLINIC | Age: 46
End: 2021-06-30
Payer: COMMERCIAL

## 2021-06-30 VITALS
WEIGHT: 174.2 LBS | HEIGHT: 68 IN | DIASTOLIC BLOOD PRESSURE: 60 MMHG | SYSTOLIC BLOOD PRESSURE: 104 MMHG | HEART RATE: 73 BPM | BODY MASS INDEX: 26.4 KG/M2

## 2021-06-30 DIAGNOSIS — E78.00 HYPERCHOLESTEREMIA: ICD-10-CM

## 2021-06-30 DIAGNOSIS — I25.118 CORONARY ARTERY DISEASE WITH STABLE ANGINA PECTORIS, UNSPECIFIED VESSEL OR LESION TYPE, UNSPECIFIED WHETHER NATIVE OR TRANSPLANTED HEART (HCC): ICD-10-CM

## 2021-06-30 DIAGNOSIS — I25.10 CORONARY ARTERY DISEASE INVOLVING NATIVE CORONARY ARTERY OF NATIVE HEART WITHOUT ANGINA PECTORIS: Primary | ICD-10-CM

## 2021-06-30 DIAGNOSIS — I10 ESSENTIAL HYPERTENSION: ICD-10-CM

## 2021-06-30 DIAGNOSIS — I25.2 OLD MYOCARDIAL INFARCTION: ICD-10-CM

## 2021-06-30 DIAGNOSIS — E78.00 HYPERCHOLESTEROLEMIA: ICD-10-CM

## 2021-06-30 PROCEDURE — 3008F BODY MASS INDEX DOCD: CPT | Performed by: INTERNAL MEDICINE

## 2021-06-30 PROCEDURE — 99214 OFFICE O/P EST MOD 30 MIN: CPT | Performed by: INTERNAL MEDICINE

## 2021-06-30 PROCEDURE — 1036F TOBACCO NON-USER: CPT | Performed by: INTERNAL MEDICINE

## 2021-06-30 PROCEDURE — 3078F DIAST BP <80 MM HG: CPT | Performed by: INTERNAL MEDICINE

## 2021-06-30 PROCEDURE — 3074F SYST BP LT 130 MM HG: CPT | Performed by: INTERNAL MEDICINE

## 2021-06-30 RX ORDER — ATORVASTATIN CALCIUM 40 MG/1
40 TABLET, FILM COATED ORAL DAILY
Qty: 90 TABLET | Refills: 3 | Status: SHIPPED | OUTPATIENT
Start: 2021-06-30 | End: 2022-07-05

## 2021-06-30 RX ORDER — NITROGLYCERIN 0.4 MG/1
0.4 TABLET SUBLINGUAL
Qty: 25 TABLET | Refills: 3 | Status: SHIPPED | OUTPATIENT
Start: 2021-06-30

## 2021-06-30 NOTE — PROGRESS NOTES
Cardiology Follow Up    Marcy Masterson  1975  [id number]  [address] [address] [phone]    1  Coronary artery disease involving native coronary artery of native heart without angina pectoris     2  Essential hypertension     3  Old myocardial infarction     4  Hypercholesteremia         Interval History:   Cardiology follow-up,  Patient doing well, denies any chest pain or dyspnea  States been compliant with low-cholesterol diet, most recent lipid profile total cholesterol 129 HDL 42 LDL 74, adequate control on high-intensity statin therapy  Compliant with low-sodium diet, blood pressures been well control    No bleeding issues on aspirin therapy patient is active, does not exercise regularly, but his work is very physical   No exertional symptoms    Patient Active Problem List   Diagnosis    Anxiety    Coronary artery disease    Epididymitis, left    Glaucoma    Pain of right heel    HTN (hypertension)    Hypercholesteremia    Impaired fasting glucose    Left foot pain    Lump of skin    Sciatica    Vitamin D deficiency    Old myocardial infarction    Rash    LUQ pain    Colon polyps     Past Medical History:   Diagnosis Date    Anterolateral myocardial infarction [facility])     last assessed 10/30/17    Chest pain     Coronary artery disease     Glaucoma     Heart disease     Hypercholesterolemia     Hypertension     Myocardial infarction ([address] )     Uveitis     Visual impairment      Social History     Socioeconomic History    Marital status: /Civil Union     Spouse name: Not on file    Number of children: Not on file    Years of education: Not on file    Highest education level: Not on file   Occupational History    Occupation: employed   Tobacco Use    Smoking status: Former Smoker    Smokeless tobacco: Never Used   Vaping Use    Vaping Use: Never used   Substance and Sexual Activity    Alcohol use: No    Drug use: Yes     Types: Marijuana    Sexual activity: Not Currently   Other Topics Concern    Not on file   Social History Narrative    Live with wife  Supportive and Safe at home  Sees dentist occasionally  No mental or sub in self      Always uses seatbelt    Exercise: walking    No advance directive    No living will    Occasional caffeine consumption     Social Determinants of Health     Financial Resource Strain:     Difficulty of Paying Living Expenses:    Food Insecurity:     Worried About Running Out of Food in the Last Year:     Ran Out of Food in the Last Year:    Transportation Needs:     Lack of Transportation (Medical):      Lack of Transportation (Non-Medical):    Physical Activity:     Days of Exercise per Week:     Minutes of Exercise per Session:    Stress:     Feeling of Stress :    Social Connections:     Frequency of Communication with Friends and Family:     Frequency of Social Gatherings with Friends and Family:     Attends [employer] Services:     Active Member of Clubs or Organizations:     Attends Club or Organization Meetings:     Marital Status:    Intimate Partner Violence:     Fear of Current or Ex-Partner:     Emotionally Abused:     Physically Abused:     Sexually Abused:       Family History   Problem Relation Age of Onset    Blindness Mother         retinitis pigmentosis    Heart attack Father         1st heart attack at age 28- had CABG in past    Diabetes Father     Blindness Maternal Aunt         retinitis pigmentosis    Dislocations Family     Heart disease Family     Hypertension Family     Mental illness Neg Hx     Substance Abuse Neg Hx      Past Surgical History:   Procedure Laterality Date    APPENDECTOMY      CARDIAC CATHETERIZATION      CARDIAC SURGERY      CORONARY STENT PLACEMENT      EYE SURGERY      TONSILLECTOMY         Current Outpatient Medications:   aspirin (ECOTRIN LOW STRENGTH) 81 mg EC tablet, Take 81 mg by mouth daily, Disp: , Rfl:     atorvastatin (LIPITOR) 40 mg tablet, Take 1 tablet by mouth once daily, Disp: 30 tablet, Rfl: 0    Cholecalciferol (VITAMIN D3) 2000 units TABS, Take 1 tablet by mouth daily, Disp: , Rfl:     [employer] F[employer] GUM[employer] CHILDREN PO, Take 2 each by mouth daily, Disp: , Rfl:     dorzolamide (TRUSOPT) 2 % ophthalmic solution, Administer 1 drop into the left eye daily, Disp: , Rfl:     dorzolamide-timolol (COSOPT) 22 3-6 8 MG/ML ophthalmic solution, INSTILL 1 DROP INTO LEFT EYE TWICE DAILY, Disp: , Rfl:     fluocinonide (LIDEX) 0 05 % cream, Apply topically 2 (two) times a day (Patient not taking: Reported on 5/10/2021), Disp: 30 g, Rfl: 0    fluorometholone (FML) 0 1 % ophthalmic suspension, Administer 1 drop into the left eye daily, Disp: , Rfl:     fluticasone (FLONASE) 50 mcg/act nasal spray, 1 spray into each nostril daily, Disp: 1 Bottle, Rfl: 3    lisinopril (ZESTRIL) 20 mg tablet, Take 1 tablet (20 mg total) by mouth daily, Disp: 90 tablet, Rfl: 3    metoprolol succinate (TOPROL-XL) 50 mg 24 hr tablet, Take 1 tablet (50 mg total) by mouth daily, Disp: 90 tablet, Rfl: 3    nitroglycerin (NITROSTAT) 0 4 mg SL tablet, Place 1 tablet (0 4 mg total) under the tongue every 5 (five) minutes as needed for chest pain, Disp: 90 tablet, Rfl: 0    pantoprazole (PROTONIX) 40 mg tablet, Take 1 tablet (40 mg total) by mouth daily, Disp: 90 tablet, Rfl: 3  Allergies   Allergen Reactions    Penicillins Anaphylaxis and Other (See Comments)    Azithromycin      Annotation - [id number]: Listed in old chart         Labs:  Office Visit on 04/19/2021   Component Date Value    Gliadin IgA 05/11/2021 3     Gliadin IgG 05/11/2021 2     TISSUE TRANSGLUTAMINASE * 05/11/2021 <2     Tissue Transglut Ab IGG 05/11/2021 9*    Endomysial IgA 05/11/2021 Negative     IgA 05/11/2021 82*    White Blood Cell Count 05/11/2021 6 7     Red Blood Cell Count 05/11/2021 5 19     Hemoglobin 05/11/2021 15 0     HCT 05/11/2021 45 8     MCV 05/11/2021 88     MCH 05/11/2021 28 9     MCHC 05/11/2021 32 8     RDW 05/11/2021 11 8     Platelet Count 78/79/2447 280     Neutrophils 05/11/2021 62     Lymphocytes 05/11/2021 29     Monocytes 05/11/2021 8     Eosinophils 05/11/2021 1     Basophils PCT 05/11/2021 0     Neutrophils (Absolute) 05/11/2021 4 1     Lymphocytes (Absolute) 05/11/2021 2 0     Monocytes (Absolute) 05/11/2021 0 5     Eosinophils (Absolute) 05/11/2021 0 1     Basophils ABS 05/11/2021 0 0     Immature Granulocytes 05/11/2021 0     Immature Granulocytes (A* 05/11/2021 0 0     Class Description 05/11/2021 Comment     D203-SnS Egg White 05/11/2021 <0 10     B487-NkX Peanut 05/11/2021 0 17*    C468-LiK Soybean 05/11/2021 0 15*    Milk 05/11/2021 0 12*    CLAM IGE 05/11/2021 <0 10     Shrimp IgE 05/11/2021 0 23*    Z169-SiU [place] 05/11/2021 0 12*    Codfish IgE 05/11/2021 <0 10     SCALLOP IGE 05/11/2021 0 15*    Wheat IgE 05/11/2021 0 22*    CORN IGE 05/11/2021 0 15*    C234-GiD Sesame Seed 05/11/2021 0 22*     Imaging: No results found  Review of Systems:  Review of Systems   Constitutional: Negative for activity change and fatigue  HENT: Negative for hearing loss and nosebleeds  Eyes: Negative for visual disturbance  Respiratory: Negative for apnea, shortness of breath, wheezing and stridor  Cardiovascular: Negative for chest pain, palpitations and leg swelling  Gastrointestinal: Negative for abdominal pain, anal bleeding and blood in stool  Endocrine: Negative for cold intolerance  Genitourinary: Negative for hematuria  Musculoskeletal: Negative for arthralgias, gait problem and myalgias  Skin: Negative for pallor and rash  Allergic/Immunologic: Negative for immunocompromised state  Neurological: Negative for dizziness, syncope and weakness  Hematological: Does not bruise/bleed easily  Psychiatric/Behavioral: Negative for sleep disturbance  The patient is not nervous/anxious  Physical Exam:  Physical Exam  Vitals reviewed  Constitutional:       General: He is not in acute distress  Appearance: Normal appearance  He is normal weight  He is not ill-appearing, toxic-appearing or diaphoretic  Eyes:      General: No scleral icterus  Neck:      Vascular: No carotid bruit  Cardiovascular:      Rate and Rhythm: Normal rate and regular rhythm  Pulses: Normal pulses  Heart sounds: Normal heart sounds  No murmur heard  No friction rub  No gallop  Pulmonary:      Effort: Pulmonary effort is normal  No respiratory distress  Breath sounds: Normal breath sounds  No stridor  No wheezing, rhonchi or rales  Musculoskeletal:      Right lower leg: No edema  Left lower leg: No edema  Skin:     General: Skin is warm and dry  Capillary Refill: Capillary refill takes less than 2 seconds  Coloration: Skin is not jaundiced  Findings: No bruising or erythema  Neurological:      General: No focal deficit present  Mental Status: He is alert  Psychiatric:         Mood and Affect: Mood normal          Discussion/Summary: coronary artery disease, premature  Anterior myocardial infarction at age 37, 10 years ago, PTCA / drug stent of the LAD, concomitant 60 70% marginal disease and 50% RCA and severe disease in the distal RCA after the PDA  Stress test last year left minutes of Maxi protocol, there was no EKG criteria for ischemia nose no criteria for ischemia by nuclear ejection fraction 59%  Good news, the patient has strong family history premature coronary disease, lipids are well control he is due for an echocardiogram     This note was completed in part utilizing [email or web address] direct voice recognition software     Grammatical errors, random word insertion, spelling mistakes, and incomplete sentences may be an occasional consequence of the system secondary to software limitations, ambient noise and hardware issues  At the time of dictation, efforts were made to edit, clarify and /or correct errors  Please read the chart carefully and recognize, using context, where substitutions have occurred  If you have any questions or concerns about the context, text or information contained within the body of this dictation, please contact myself, the provider, for further clarification

## 2021-06-30 NOTE — TELEPHONE ENCOUNTER
atorvastatin (LIPITOR) 40 mg tablet [[id number]    Patient was just in today & forgot to ask for a 90 day refill for this medication

## 2021-07-15 DIAGNOSIS — R10.12 LUQ PAIN: ICD-10-CM

## 2021-07-16 RX ORDER — PANTOPRAZOLE SODIUM 40 MG/1
40 TABLET, DELAYED RELEASE ORAL DAILY
Qty: 90 TABLET | Refills: 3 | Status: SHIPPED | OUTPATIENT
Start: 2021-07-16

## 2021-07-30 ENCOUNTER — CONSULT (OUTPATIENT)
Dept: GASTROENTEROLOGY | Facility: CLINIC | Age: 46
End: 2021-07-30
Payer: COMMERCIAL

## 2021-07-30 ENCOUNTER — HOSPITAL ENCOUNTER (OUTPATIENT)
Dept: NON INVASIVE DIAGNOSTICS | Age: 46
Discharge: HOME | End: 2021-07-30
Payer: COMMERCIAL

## 2021-07-30 VITALS
SYSTOLIC BLOOD PRESSURE: 120 MMHG | DIASTOLIC BLOOD PRESSURE: 70 MMHG | WEIGHT: 172 LBS | HEIGHT: 68 IN | BODY MASS INDEX: 26.07 KG/M2

## 2021-07-30 DIAGNOSIS — Z12.11 SCREENING FOR COLON CANCER: ICD-10-CM

## 2021-07-30 DIAGNOSIS — R10.12 LUQ PAIN: ICD-10-CM

## 2021-07-30 DIAGNOSIS — I25.10 CORONARY ARTERY DISEASE INVOLVING NATIVE CORONARY ARTERY OF NATIVE HEART WITHOUT ANGINA PECTORIS: ICD-10-CM

## 2021-07-30 DIAGNOSIS — R89.4 ABNORMAL CELIAC ANTIBODY PANEL: Primary | ICD-10-CM

## 2021-07-30 DIAGNOSIS — I10 ESSENTIAL HYPERTENSION: ICD-10-CM

## 2021-07-30 DIAGNOSIS — E78.00 HYPERCHOLESTEREMIA: ICD-10-CM

## 2021-07-30 DIAGNOSIS — I25.2 OLD MYOCARDIAL INFARCTION: ICD-10-CM

## 2021-07-30 PROBLEM — R10.11 RIGHT UPPER QUADRANT PAIN: Status: RESOLVED | Noted: 2021-07-30 | Resolved: 2021-07-30

## 2021-07-30 PROBLEM — R10.11 RIGHT UPPER QUADRANT PAIN: Status: ACTIVE | Noted: 2021-07-30

## 2021-07-30 PROCEDURE — 3008F BODY MASS INDEX DOCD: CPT | Performed by: INTERNAL MEDICINE

## 2021-07-30 PROCEDURE — 1036F TOBACCO NON-USER: CPT | Performed by: INTERNAL MEDICINE

## 2021-07-30 PROCEDURE — 93306 TTE W/DOPPLER COMPLETE: CPT | Performed by: INTERNAL MEDICINE

## 2021-07-30 PROCEDURE — 93306 TTE W/DOPPLER COMPLETE: CPT

## 2021-07-30 PROCEDURE — 3078F DIAST BP <80 MM HG: CPT | Performed by: INTERNAL MEDICINE

## 2021-07-30 PROCEDURE — 99243 OFF/OP CNSLTJ NEW/EST LOW 30: CPT | Performed by: INTERNAL MEDICINE

## 2021-07-30 PROCEDURE — 3074F SYST BP LT 130 MM HG: CPT | Performed by: INTERNAL MEDICINE

## 2021-07-30 NOTE — PROGRESS NOTES
[facility] - Outpatient Consultation  Stefani Cleaning 48 y o  male MRN: [id number]  Encounter: [id number]    ASSESSMENT AND PLAN:      1  Abnormal celiac antibody panel  On celiac panel, IgA was low which would be generally mean that the test was not interpretable with respect to a negative test   However given positive tissue Transglugt Ab IgG- patient may have celiac disease and the standard of care would be to do a small-bowel biopsy  Tissue Transglut Ab IGG 0 - 5 U/mL 9High       IgA 90 - 386 mg/dL 82Low       -  EGD with biopsies to rule out celiac disease    2  Internal hemorrhoids   colonoscopy in December 2020 showed prolapsing internal hemorrhoids  Patient is requesting a banding     - Schedule hemorrhoidal banding    3  LUQ pain  Patient was complaining of left upper quadrant pain for about 7 months which is now resolved  - Continue PPI    4  Screening for colon cancer   Last colonoscopy December 2020  Negative for any polyps or masses  However previous colonoscopy 7 years ago did have polyps with a 5 year recall  Next colonoscopy due in December 2025      Followup Appointment: will call with celiac results, follow up PRN  ______________________________________________________________________    Chief Complaint   Patient presents with    Follow-up     abnormal labs       HPI:   Stefani Cleaning is a 48y o  year old male who presents for  Review of abnormal celiac panel as well as left upper quadrant pain that has resolved  Patient was having left upper quadrant pain since December of 2020  He states that it lasted for about 2-3 hours per day and the only thing that made it feel better was going to sleep  This did not wake him from sleep  He was not able to associated with specific foods however said that if he ate a large meal then he would notice more of a stabbing pain versus the discomfort/ irritating pain that usually was    Had a CT scan in December of 2020 that showed sigmoid diverticular disease with segmental bowel wall thickening similar to the prior study and likely reflecting chronic sequelae of an infectious or inflammatory colitis  Colonoscopy in December 2020 showed sigmoid diverticulosis with no stricture or obstruction noted  He did have some prolapsing internal hemorrhoids  No masses or polyps identified  He does have a history of GERD for which he takes Protonix and that is well controlled  Primary care physician ordered a celiac disease antibody profile as well as the specific IgG allergy panel   Which came back abnormal   Patient has not made any changes in his diet and he does not acknowledge any specifics foods that had make this pain better or worse      Historical Information   Past Medical History:   Diagnosis Date    Anterolateral myocardial infarction [facility])     last assessed 10/30/17    Chest pain     Coronary artery disease     Glaucoma     Heart disease     Hypercholesterolemia     Hypertension     [address]ocardial infarction [address] [address]     Uveitis     Visual impairment      Past Surgical History:   Procedure Laterality Date    APPENDECTOMY      CARDIAC CATHETERIZATION      CARDIAC SURGERY      CORONARY STENT PLACEMENT      EYE SURGERY      TONSILLECTOMY       Social History     Substance and Sexual Activity   Alcohol Use No     Social History     Substance and Sexual Activity   Drug Use Yes    Types: Marijuana     Social History     Tobacco Use   Smoking Status Former Smoker   Smokeless Tobacco Never Used     Family History   Problem Relation Age of Onset    Blindness Mother         retinitis pigmentosis    Heart attack Father         1st heart attack at age 28- had CABG in past    Diabetes Father     Blindness Maternal Aunt         retinitis pigmentosis    Dislocations Family     Heart disease Family     Hypertension Family     Mental illness Neg Hx     Substance Abuse Neg Hx     Colon cancer Neg Hx     Colon polyps Neg Hx        Meds/Allergies     Current Outpatient Medications:     aspirin (ECOTRIN LOW STRENGTH) 81 mg EC tablet    atorvastatin (LIPITOR) 40 mg tablet    Cholecalciferol (VITAMIN D3) 2000 units TABS    [employer] F[employer] GUM[employer] CHILDREN PO    dorzolamide (TRUSOPT) 2 % ophthalmic solution    dorzolamide-timolol (COSOPT) 22 3-6 8 MG/ML ophthalmic solution    fluorometholone (FML) 0 1 % ophthalmic suspension    fluticasone (FLONASE) 50 mcg/act nasal spray    lisinopril (ZESTRIL) 20 mg tablet    metoprolol succinate (TOPROL-XL) 50 mg 24 hr tablet    nitroglycerin (Nitrostat) 0 4 mg SL tablet    pantoprazole (PROTONIX) 40 mg tablet    Allergies   Allergen Reactions    Penicillins Anaphylaxis and Other (See Comments)    Azithromycin      Annotation - [id number]: Listed in old chart  PHYSICAL EXAM:    Blood pressure 120/70, height 5' 8" (1 727 m), weight 78 kg (172 lb)  Body mass index is 26 15 kg/m²  General Appearance: NAD, cooperative, alert  Eyes: Anicteric  ENT:  Normocephalic, atraumatic, normal mucosa  Neck:  Supple, symmetrical, trachea midline,   Resp:  Clear to auscultation bilaterally; no rales, rhonchi or wheezing; respirations unlabored   CV:  S1 S2, Regular rate and rhythm; no murmur, rub, or gallop  GI:  Soft, non-tender, non-distended; normal bowel sounds; no masses, no organomegaly   Rectal: Deferred  Musculoskeletal: No cyanosis, clubbing or edema  Normal ROM    Skin:  No jaundice, rashes, or lesions   Heme/Lymph: No palpable cervical lymphadenopathy  Psych: Normal affect, good eye contact  Neuro: No gross deficits, AAOx3    Lab Results:   Lab Results   Component Value Date    WBC 6 7 05/11/2021    HGB 15 0 05/11/2021    HCT 45 8 05/11/2021    MCV 88 05/11/2021     05/11/2021     Lab Results   Component Value Date     09/08/2017    K 4 2 12/13/2020     12/13/2020    CO2 29 12/13/2020    ANIONGAP 2 (L) 12/15/2015    BUN 13 12/13/2020    CREATININE 0 99 2020    GLUCOSE 84 2017    CALCIUM 9 1 2020    AST 25 2020    ALT 42 2020    ALKPHOS 56 2020    PROT 6 9 2017    BILITOT 0 6 2017    EGFR 90 2020     No results found for: IRON, TIBC, FERRITIN  Lab Results   Component Value Date    LIPASE 117 2020       Radiology Results:   Echo complete with contrast if indicated    Result Date: 2021  Narrative: [address], [address] [facility] Transthoracic Echocardiogram 2D, M-mode, Doppler, and Color Doppler Study date:  2021 Patient: Margarita Zavala MR number: [id number] Account number: [de-identified] : 1975 Age: 48 years Gender: Male Status: Outpatient Location: [facility] Height: 68 in Weight: 173 6 lb BP: 104/ 60 mmHg Indications: CAD; HTN; Old MI; Hypercholesterolemia Diagnoses: E78 0 - Pure hypercholesterolemia, I10  - Essential (primary) hypertension, I25 10 - Atherosclerotic heart disease of native coronary artery without angina pectoris, I25 2 - Old myocardial infarction Sonographer:  DELIO Crocker Primary Physician:  Michelle Lakhani DO Referring Physician:  Jada Hamm MD Group:  [id number] [id number] [facility] Interpreting Physician:  Katie Wagoner MD SUMMARY LEFT VENTRICLE: Systolic function was normal  Ejection fraction was estimated to be 65 %  There were no regional wall motion abnormalities  RIGHT ATRIUM: The atrium was mildly dilated  MITRAL VALVE: There was mild regurgitation  TRICUSPID VALVE: There was trace regurgitation  HISTORY: PRIOR HISTORY: IFG; CAD; HTN; HLD; Old anterolateral MI; Vitamin D deficiency; Chest pain; Heart disease; F  smoker PROCEDURE: The study was performed in the [facility]  This was a routine study  The transthoracic approach was used  The study included complete 2D imaging, M-mode, complete spectral Doppler, and color Doppler   Echocardiographic views were limited due to decreased penetration and lung interference  This was a technically difficult study  LEFT VENTRICLE: Size was normal  Systolic function was normal  Ejection fraction was estimated to be 65 %  There were no regional wall motion abnormalities  Wall thickness was normal  DOPPLER: Left ventricular diastolic function parameters were normal  RIGHT VENTRICLE: The size was normal  Systolic function was normal  Wall thickness was normal  LEFT ATRIUM: Size was normal  RIGHT ATRIUM: The atrium was mildly dilated  MITRAL VALVE: Valve structure was normal  There was normal leaflet separation  DOPPLER: The transmitral velocity was within the normal range  There was no evidence for stenosis  There was mild regurgitation  AORTIC VALVE: The valve was trileaflet  Leaflets exhibited normal thickness and normal cuspal separation  DOPPLER: Transaortic velocity was within the normal range  There was no evidence for stenosis  There was no significant regurgitation  TRICUSPID VALVE: The valve structure was normal  There was normal leaflet separation  DOPPLER: The transtricuspid velocity was within the normal range  There was no evidence for stenosis  There was trace regurgitation  PULMONIC VALVE: Not well visualized  DOPPLER: The transpulmonic velocity was within the normal range  There was no significant regurgitation  PERICARDIUM: There was no pericardial effusion  The pericardium was normal in appearance  AORTA: The root exhibited normal size  SYSTEMIC VEINS: IVC: The inferior vena cava was normal in size  PULMONARY VEINS: DOPPLER: Doppler signals were not recordable in the pulmonary vein(s)   SYSTEM MEASUREMENT TABLES 2D %FS: 37 % Ao Diam: 2 92 cm EDV(Teich): 65 35 ml EF(Teich): 67 57 % ESV(Teich): 21 19 ml IVSd: 1 03 cm LA Area: 15 01 cm2 LA Diam: 3 37 cm LVEDV MOD A4C: 121 02 ml LVEF MOD A4C: 55 72 % LVESV MOD A4C: 53 59 ml LVIDd: 3 89 cm LVIDs: 2 45 cm LVLd A4C: 8 91 cm LVLs A4C: 7 47 cm LVPWd: 0 97 cm RA Area: 24 84 cm2 RVIDd: 3 57 cm SV MOD A4C: 67 43 ml SV(Teich): 44 16 ml MM TAPSE: 1 91 cm PW E' Sept: 0 08 m/s E/E' Sept: 8 88 MV A Hieu: 0 78 m/s MV Dec Chilton: 4 05 m/s2 MV DecT: 184 96 ms MV E Hieu: 0 75 m/s MV E/A Ratio: 0 95 MV PHT: 53 64 ms MVA By PHT: 4 1 cm2 [facility] Accredited Echocardiography [facility] Prepared and electronically signed by Suresh Coe MD Signed 30-Jul-2021 13:36:02         REVIEW OF SYSTEMS:    CONSTITUTIONAL: Denies any fever, chills, rigors, and weight loss  HEENT: No earache or tinnitus  Denies hearing loss or visual disturbances  CARDIOVASCULAR: No chest pain or palpitations  RESPIRATORY: Denies any cough, hemoptysis, shortness of breath or dyspnea on exertion  GASTROINTESTINAL: As noted in the History of Present Illness  GENITOURINARY: No problems with urination  Denies any hematuria or dysuria  NEUROLOGIC: No dizziness or vertigo, denies headaches  MUSCULOSKELETAL: Denies any muscle or joint pain  SKIN: Denies skin rashes or itching  ENDOCRINE: Denies excessive thirst  Denies intolerance to heat or cold  PSYCHOSOCIAL: Denies depression or anxiety  Denies any recent memory loss

## 2021-07-30 NOTE — LETTER
July 30, 2021     Faizan Hannah, [address]  [address] [address]    Patient: Arabella Shelley   YOB: 1975   Date of Visit: 7/30/2021       Dear Dr Prasad Harvey: Thank you for referring Nelli Tom to me for evaluation  Below are my notes for this consultation  If you have questions, please do not hesitate to call me  I look forward to following your patient along with you  Sincerely,        Amy Gonzalez MD        CC: No Recipients  Ricardo Babcock, [place]  7/30/2021  3:47 PM  Sign when Signing Visit    [facility] - Outpatient Consultation  Arabella Shelley 48 y o  male MRN: [id number]  Encounter: [id number]    ASSESSMENT AND PLAN:      1  Abnormal celiac antibody panel  Tissue Transglut Ab IGG 0 - 5 U/mL 9High       IgA 90 - 386 mg/dL 82Low       -  EGD with biopsies to rule out celiac disease    2  Internal hemorrhoids   colonoscopy in December 2020 showed prolapsing internal hemorrhoids  Patient is requesting a banding   - Schedule hemorrhoidal banding    3  LUQ pain  Patient was complaining of left upper quadrant pain for about 7 months which is now resolved  - Continue PPI    4  Screening for colon cancer   Last colonoscopy December 2020  Negative for any polyps negative for any polyps  However previous colonoscopy 7 years ago did have polyps with a 5 year recall  Next colonoscopy due in December 2025      Followup Appointment: will call with celiac results, follow up PRN  ______________________________________________________________________    Chief Complaint   Patient presents with    Follow-up     abnormal labs       HPI:   Arabella Shelley is a 48y o  year old male who presents for  Review of abnormal celiac panel as well as left upper quadrant pain that has resolved  Patient was having left upper quadrant pain since December of 2020   He states that it lasted for about 2-3 hours per day and the only thing that made it feel better was going to sleep  This did not wake him from sleep  He was not able to associated with specific foods however said that if he ate a large meal then he would notice more of a stabbing pain versus the discomfort/ irritating pain that usually was  Had a CT scan in December of 2020 that showed sigmoid diverticular disease with segmental bowel wall thickening similar to the prior study and likely reflecting chronic sequelae of an infectious or inflammatory colitis  Colonoscopy in December 2020 showed sigmoid diverticulosis with no stricture or obstruction noted  He did have some prolapsing internal hemorrhoids  No masses or polyps identified  He does have a history of GERD for which he takes Protonix and that is well controlled  Primary care physician ordered a celiac disease antibody profile as well as the specific IgG allergy panel   Which came back abnormal   Patient has not made any changes in his diet and he does not acknowledge any specifics foods that had make this pain better or worse      Historical Information   Past Medical History:   Diagnosis Date    Anterolateral myocardial infarction [facility])     last assessed 10/30/17    Chest pain     Coronary artery disease     Glaucoma     Heart disease     Hypercholesterolemia     Hypertension     Myocardial infarction ([address] )     Uveitis     Visual impairment      Past Surgical History:   Procedure Laterality Date    APPENDECTOMY      CARDIAC CATHETERIZATION      CARDIAC SURGERY      CORONARY STENT PLACEMENT      EYE SURGERY      TONSILLECTOMY       Social History     Substance and Sexual Activity   Alcohol Use No     Social History     Substance and Sexual Activity   Drug Use Yes    Types: Marijuana     Social History     Tobacco Use   Smoking Status Former Smoker   Smokeless Tobacco Never Used     Family History   Problem Relation Age of Onset    Blindness Mother         retinitis pigmentosis    Heart attack Father 1st heart attack at age 28- had CABG in past    Diabetes Father     Blindness Maternal Aunt         retinitis pigmentosis    Dislocations Family     Heart disease Family     Hypertension Family     Mental illness Neg Hx     Substance Abuse Neg Hx     Colon cancer Neg Hx     Colon polyps Neg Hx        Meds/Allergies     Current Outpatient Medications:     aspirin (ECOTRIN LOW STRENGTH) 81 mg EC tablet    atorvastatin (LIPITOR) 40 mg tablet    Cholecalciferol (VITAMIN D3) 2000 units TABS    [employer] GUM[employer] CHILDREN PO    dorzolamide (TRUSOPT) 2 % ophthalmic solution    dorzolamide-timolol (COSOPT) 22 3-6 8 MG/ML ophthalmic solution    fluorometholone (FML) 0 1 % ophthalmic suspension    fluticasone (FLONASE) 50 mcg/act nasal spray    lisinopril (ZESTRIL) 20 mg tablet    metoprolol succinate (TOPROL-XL) 50 mg 24 hr tablet    nitroglycerin (Nitrostat) 0 4 mg SL tablet    pantoprazole (PROTONIX) 40 mg tablet    Allergies   Allergen Reactions    Penicillins Anaphylaxis and Other (See Comments)    Azithromycin      Annotation - [id number]: Listed in old chart  PHYSICAL EXAM:    Blood pressure 120/70, height 5' 8" (1 727 m), weight 78 kg (172 lb)  Body mass index is 26 15 kg/m²  General Appearance: NAD, cooperative, alert  Eyes: Anicteric  ENT:  Normocephalic, atraumatic, normal mucosa  Neck:  Supple, symmetrical, trachea midline,   Resp:  Clear to auscultation bilaterally; no rales, rhonchi or wheezing; respirations unlabored   CV:  S1 S2, Regular rate and rhythm; no murmur, rub, or gallop  GI:  Soft, non-tender, non-distended; normal bowel sounds; no masses, no organomegaly   Rectal: Deferred  Musculoskeletal: No cyanosis, clubbing or edema  Normal ROM    Skin:  No jaundice, rashes, or lesions   Heme/Lymph: No palpable cervical lymphadenopathy  Psych: Normal affect, good eye contact  Neuro: No gross deficits, AAOx3    Lab Results:   Lab Results   Component Value Date    WBC 6 7 2021    HGB 15 0 2021    HCT 45 8 2021    MCV 88 2021     2021     Lab Results   Component Value Date     2017    K 4 2 2020     2020    CO2 29 2020    ANIONGAP 2 (L) 12/15/2015    BUN 13 2020    CREATININE 0 99 2020    GLUCOSE 84 2017    CALCIUM 9 1 2020    AST 25 2020    ALT 42 2020    ALKPHOS 56 2020    PROT 6 9 2017    BILITOT 0 6 2017    EGFR 90 2020     No results found for: IRON, TIBC, FERRITIN  Lab Results   Component Value Date    LIPASE 117 2020       Radiology Results:   Echo complete with contrast if indicated    Result Date: 2021  Narrative: [address] [facility], [address] [place] [address] Transthoracic Echocardiogram 2D, M-mode, Doppler, and Color Doppler Study date:  2021 Patient: Yeyo Martel MR number: [id number] Account number: [de-identified] : 1975 Age: 48 years Gender: Male Status: Outpatient Location: [facility] Height: 68 in Weight: 173 6 lb BP: 104/ 60 mmHg Indications: CAD; HTN; Old MI; Hypercholesterolemia Diagnoses: E78 0 - Pure hypercholesterolemia, I10  - Essential (primary) hypertension, I25 10 - Atherosclerotic heart disease of native coronary artery without angina pectoris, I25 2 - Old myocardial infarction Sonographer:  DELIO Huff Primary Physician:  Rubén Monroe DO Referring Physician:  Jh Chairez MD Group:  [facility] Interpreting Physician:  Rainer Singleton MD SUMMARY LEFT VENTRICLE: Systolic function was normal  Ejection fraction was estimated to be 65 %  There were no regional wall motion abnormalities  RIGHT ATRIUM: The atrium was mildly dilated  MITRAL VALVE: There was mild regurgitation  TRICUSPID VALVE: There was trace regurgitation  HISTORY: PRIOR HISTORY: IFG; CAD; HTN; HLD; Old anterolateral MI; Vitamin D deficiency;  Chest pain; Heart disease; F  smoker PROCEDURE: The study was performed in the [facility]  This was a routine study  The transthoracic approach was used  The study included complete 2D imaging, M-mode, complete spectral Doppler, and color Doppler  Echocardiographic views were limited due to decreased penetration and lung interference  This was a technically difficult study  LEFT VENTRICLE: Size was normal  Systolic function was normal  Ejection fraction was estimated to be 65 %  There were no regional wall motion abnormalities  Wall thickness was normal  DOPPLER: Left ventricular diastolic function parameters were normal  RIGHT VENTRICLE: The size was normal  Systolic function was normal  Wall thickness was normal  LEFT ATRIUM: Size was normal  RIGHT ATRIUM: The atrium was mildly dilated  MITRAL VALVE: Valve structure was normal  There was normal leaflet separation  DOPPLER: The transmitral velocity was within the normal range  There was no evidence for stenosis  There was mild regurgitation  AORTIC VALVE: The valve was trileaflet  Leaflets exhibited normal thickness and normal cuspal separation  DOPPLER: Transaortic velocity was within the normal range  There was no evidence for stenosis  There was no significant regurgitation  TRICUSPID VALVE: The valve structure was normal  There was normal leaflet separation  DOPPLER: The transtricuspid velocity was within the normal range  There was no evidence for stenosis  There was trace regurgitation  PULMONIC VALVE: Not well visualized  DOPPLER: The transpulmonic velocity was within the normal range  There was no significant regurgitation  PERICARDIUM: There was no pericardial effusion  The pericardium was normal in appearance  AORTA: The root exhibited normal size  SYSTEMIC VEINS: IVC: The inferior vena cava was normal in size  PULMONARY VEINS: DOPPLER: Doppler signals were not recordable in the pulmonary vein(s)   SYSTEM MEASUREMENT TABLES 2D %FS: 37 % Ao Diam: 2 92 cm EDV(Teich): 65 35 ml EF(Teich): 67 57 % ESV(Teich): 21 19 ml IVSd: 1 03 cm LA Area: 15 01 cm2 LA Diam: 3 37 cm LVEDV MOD A4C: 121 02 ml LVEF MOD A4C: 55 72 % LVESV MOD A4C: 53 59 ml LVIDd: 3 89 cm LVIDs: 2 45 cm LVLd A4C: 8 91 cm LVLs A4C: 7 47 cm LVPWd: 0 97 cm RA Area: 24 84 cm2 RVIDd: 3 57 cm SV MOD A4C: 67 43 ml SV(Teich): 44 16 ml MM TAPSE: 1 91 cm PW E' Sept: 0 08 m/s E/E' Sept: 8 88 MV A Hieu: 0 78 m/s MV Dec [place]: 4 05 m/s2 MV DecT: 184 96 ms MV E Hieu: 0 75 m/s MV E/A Ratio: 0 95 MV PHT: 53 64 ms MVA By PHT: 4 1 cm2 [facility] Prepared and electronically signed by Santiago Nicholson MD Signed 30-Jul-2021 13:36:02         REVIEW OF SYSTEMS:    CONSTITUTIONAL: Denies any fever, chills, rigors, and weight loss  HEENT: No earache or tinnitus  Denies hearing loss or visual disturbances  CARDIOVASCULAR: No chest pain or palpitations  RESPIRATORY: Denies any cough, hemoptysis, shortness of breath or dyspnea on exertion  GASTROINTESTINAL: As noted in the History of Present Illness  GENITOURINARY: No problems with urination  Denies any hematuria or dysuria  NEUROLOGIC: No dizziness or vertigo, denies headaches  MUSCULOSKELETAL: Denies any muscle or joint pain  SKIN: Denies skin rashes or itching  ENDOCRINE: Denies excessive thirst  Denies intolerance to heat or cold  PSYCHOSOCIAL: Denies depression or anxiety  Denies any recent memory loss

## 2021-08-03 ENCOUNTER — TELEPHONE (OUTPATIENT)
Dept: GASTROENTEROLOGY | Facility: CLINIC | Age: 46
End: 2021-08-03

## 2021-08-19 ENCOUNTER — ANESTHESIA EVENT (OUTPATIENT)
Dept: GASTROENTEROLOGY | Facility: AMBULATORY SURGERY CENTER | Age: 46
End: 2021-08-19

## 2021-08-19 NOTE — ANESTHESIA PREPROCEDURE EVALUATION
Procedure:  EGD    Relevant Problems   CARDIO   (+) Coronary artery disease   (+) HTN (hypertension)   (+) Hypercholesteremia   (+) Old myocardial infarction (S/P Stent palcement)      MUSCULOSKELETAL   (+) Sciatica      NEURO/PSYCH   (+) Anxiety   7/30/21  EF- 65%, No valvular Disease   Normal Stress Test-1/20  Physical Exam    Airway    Mallampati score: II  TM Distance: >3 FB  Neck ROM: full     Dental   No notable dental hx     Cardiovascular      Pulmonary      Other Findings        Anesthesia Plan  ASA Score- 3     Anesthesia Type- IV sedation with anesthesia with ASA Monitors  Additional Monitors:   Airway Plan:           Plan Factors-Exercise tolerance (METS): >4 METS  Chart reviewed  Patient is a current smoker (Marijuana, none since 6 weeks ago)  Patient not instructed to abstain from smoking on day of procedure  Patient did not smoke on day of surgery  Induction- intravenous  Postoperative Plan-     Informed Consent- Anesthetic plan and risks discussed with patient  I personally reviewed this patient with the CRNA  Discussed and agreed on the Anesthesia Plan with the CRNA  Chantal Ozuna

## 2021-08-20 ENCOUNTER — HOSPITAL ENCOUNTER (OUTPATIENT)
Dept: GASTROENTEROLOGY | Facility: AMBULATORY SURGERY CENTER | Age: 46
Discharge: HOME | End: 2021-08-20
Payer: COMMERCIAL

## 2021-08-20 ENCOUNTER — ANESTHESIA (OUTPATIENT)
Dept: GASTROENTEROLOGY | Facility: AMBULATORY SURGERY CENTER | Age: 46
End: 2021-08-20

## 2021-08-20 VITALS
OXYGEN SATURATION: 98 % | SYSTOLIC BLOOD PRESSURE: 121 MMHG | DIASTOLIC BLOOD PRESSURE: 79 MMHG | RESPIRATION RATE: 22 BRPM | TEMPERATURE: 98.4 F | HEART RATE: 55 BPM

## 2021-08-20 DIAGNOSIS — R89.4 ABNORMAL CELIAC ANTIBODY PANEL: ICD-10-CM

## 2021-08-20 PROCEDURE — 43239 EGD BIOPSY SINGLE/MULTIPLE: CPT | Performed by: INTERNAL MEDICINE

## 2021-08-20 RX ORDER — SODIUM CHLORIDE, SODIUM LACTATE, POTASSIUM CHLORIDE, CALCIUM CHLORIDE 600; 310; 30; 20 MG/100ML; MG/100ML; MG/100ML; MG/100ML
50 INJECTION, SOLUTION INTRAVENOUS CONTINUOUS
Status: DISCONTINUED | OUTPATIENT
Start: 2021-08-20 | End: 2021-08-24 | Stop reason: HOSPADM

## 2021-08-20 RX ORDER — LIDOCAINE HYDROCHLORIDE 10 MG/ML
INJECTION, SOLUTION EPIDURAL; INFILTRATION; INTRACAUDAL; PERINEURAL AS NEEDED
Status: DISCONTINUED | OUTPATIENT
Start: 2021-08-20 | End: 2021-08-20

## 2021-08-20 RX ORDER — PROPOFOL 10 MG/ML
INJECTION, EMULSION INTRAVENOUS AS NEEDED
Status: DISCONTINUED | OUTPATIENT
Start: 2021-08-20 | End: 2021-08-20

## 2021-08-20 RX ADMIN — PROPOFOL 60 MG: 10 INJECTION, EMULSION INTRAVENOUS at 10:55

## 2021-08-20 RX ADMIN — LIDOCAINE HYDROCHLORIDE 50 MG: 10 INJECTION, SOLUTION EPIDURAL; INFILTRATION; INTRACAUDAL; PERINEURAL at 10:53

## 2021-08-20 RX ADMIN — PROPOFOL 150 MG: 10 INJECTION, EMULSION INTRAVENOUS at 10:53

## 2021-08-20 RX ADMIN — SODIUM CHLORIDE, SODIUM LACTATE, POTASSIUM CHLORIDE, CALCIUM CHLORIDE 50 ML/HR: 600; 310; 30; 20 INJECTION, SOLUTION INTRAVENOUS at 10:21

## 2021-08-20 NOTE — DISCHARGE INSTRUCTIONS
Upper Endoscopy   WHAT YOU NEED TO KNOW:   An upper endoscopy is also called an upper gastrointestinal (GI) endoscopy, or an esophagogastroduodenoscopy (EGD)  It is a procedure to examine the inside of your esophagus, stomach, and duodenum (first part of the small intestine) with a scope  You may feel bloated, gassy, or have some abdominal discomfort after your procedure  Your throat may be sore for 24 to 36 hours  You may burp or pass gas from air that is still inside your body  DISCHARGE INSTRUCTIONS:   Seek care immediately if:    You have sudden, severe abdominal pain   You have problems swallowing   You have a large amount of black, sticky bowel movements or blood in your bowel movements   You have sudden trouble breathing   You feel weak, lightheaded, or faint or your heart beats faster than normal for you  Contact your healthcare provider if:    You have a fever and chills   You have nausea or are vomiting   Your abdomen is bloated or feels full and hard   You have abdominal pain   You have black, sticky bowel movements or blood in your bowel movements   You have not had a bowel movement for 3 days after your procedure   You have rash or hives   You have questions or concerns about your procedure  Activity:    Do not lift, strain, or run for 24 hours after your procedure   Rest after your procedure  You have been given medicine to relax you  Do not drive or make important decisions until the day after your procedure  Return to your normal activity as directed   Relieve gas and discomfort from bloating by lying on your right side with a heating pad on your abdomen  You may need to take short walks to help the gas move out  Eat small meals until bloating is relieved  Follow up with your healthcare provider as directed: Write down your questions so you remember to ask them during your visits       If you take a blood thinner, please review the specific instructions from your endoscopist about when you should resume it  These can be found in the Recommendation and Your Medication list sections of this After Visit Summary  Celiac Disease   WHAT YOU NEED TO KNOW:   What is celiac disease? Celiac disease is a long-term condition that affects your small intestine  Your immune system reacts to the protein gluten in food and damages your small intestine  You may not be able to absorb vitamins, minerals, and other nutrients from the foods you eat  What increases my risk of celiac disease? The cause of celiac disease is not known  You are at higher risk if you have another autoimmune disease, such as rheumatoid arthritis, or a family member with celiac disease  What are the signs and symptoms of celiac disease? The most common symptom of celiac disease is diarrhea that may smell bad or look oily  You may also have the following:  · Stomach pain, bloating, gas, and weight loss    · Weakness, low energy, and loss of appetite    · Bone pain or osteoporosis (bone loss)    · Missed monthly periods or difficulty getting pregnant    · Numbness or tingling in your legs and muscle cramps    · Mouth sores or a skin rash that itches    How is celiac disease diagnosed? · Blood tests  are used to check for antibodies to gluten  They may also be used to check for anemia and other deficiencies caused by celiac disease  · A sample of your bowel movement  is tested to see if you are absorbing nutrients from your diet  For this test, you will need to eat a high-fat diet for 1 day  Then you will need to collect your bowel movements for 2 days  The samples will be sent to a lab  · Small bowel barium x-rays  are pictures of your abdomen  You will need to swallow a thick liquid called barium  Barium helps the intestines show up better on the x-ray  · An endoscopic tissue biopsy  is a procedure to look at the inside of your small intestine   An endoscope is a bendable tube with a light and camera on the end  Healthcare providers may remove a small amount of tissue for a biopsy  How is celiac disease treated? Since there is no cure for celiac disease, the goal of treatment is to reduce the symptoms  It may take up to 6 months or longer for your intestines to function better  You may need medicine such as steroids to suppress your immune system and decrease inflammation  How do I manage celiac disease? · Do not eat food that contains gluten  This is the most important way to manage your symptoms  Do not eat anything made with wheat, rye, barley, or oats  Gluten is found in additives in many packaged and restaurant foods  Read food labels or ask before you order food  A dietitian can help you plan meals that do not contain gluten  · Ask about supplements  You may need to take supplements that contain iron, folic acid, vitamin C42, calcium, or vitamin D  These may be given as a pill or through an IV  When should I contact my healthcare provider? · You have new symptoms or your symptoms get worse  · You have questions or concerns about your condition or care  When should I seek immediate care or call 911? · You have a fever  · You have severe abdominal pain  · You have blood in your bowel movement  CARE AGREEMENT:   You have the right to help plan your care  Learn about your health condition and how it may be treated  Discuss treatment options with your healthcare providers to decide what care you want to receive  You always have the right to refuse treatment  The above information is an  only  It is not intended as medical advice for individual conditions or treatments  Talk to your doctor, nurse or pharmacist before following any medical regimen to see if it is safe and effective for you    © Copyright [employer] 2021 Information is for End User's use only and may not be sold, redistributed or otherwise used for commercial purposes  All illustrations and images included in CareNotes® are the copyrighted property of A D A M , Inc  or [employer]      Gluten-Free Diet   WHAT Enrique:   What is a gluten-free diet? A gluten-free diet is a meal plan that does not contain any gluten  Gluten is a protein found in wheat, rye, and barley  Gluten is found in many breads, pastas, cereals, cakes, pies, and cookies  Some vitamin supplements and medicines may also contain gluten  You need to follow this diet for the rest of your life if you have celiac disease, dermatitis herpetiformis, or non-celiac gluten sensitivity  Which foods should I avoid? Do not eat foods that contain the following ingredients:  · Barley, barley malt, barley extract, rye, bulgar, semolina, and triticale    · Wheat, wheat bran, wheat germ, and wheat starch     · Wheat varieties such as kamut and spelt    · Bran, couscous, durum, farina, and orzo    · Matzo flour, matzo meal, jay flour     · Oats that are not labeled as gluten-free, unless your dietitian has allowed you to eat a small amount    · Malt extract and malt flavoring    · Einkorn, emmer, faro, panko, seitan, and udon    What foods can I have? Choose foods labeled as gluten-free  You may be able to eat gluten-free oats, or you may be able to eat regular oats in small amounts  Ask your dietitian if oats are safe for you to eat  You may eat foods that are made with the following types of grains and starches:   · Corn, potato starch, and potato flour    · Soy, tapioca, and teff    · Rice, rice bran, quinoa, sago, and sorghum    · Amaranth, arrowroot, and buckwheat    · Flours made from nuts, beans, and seeds    · Flax, millet, and montina  What are examples of gluten-free foods?    · Fresh fruits and vegetables    · Eggs, meat, fish, and poultry    · Dried beans, lentils, and peas    · Beverages such as 100% fruit juice, coffee, tea, cocoa, and soft drinks    · Fats and oils, such as butter, margarine, and vegetable, canola, and olive oils    · Regular, unflavored milk, cottage cheese, most yogurts, and aged cheese such as cheddar cheese    · Cream, sour cream, cream cheese, most ice creams, and sherbets    · Cream of rice, grits, puffed rice, brown rice, and white rice    · Corn tacos and tortillas    What is a sample menu for 1 day? · Breakfast:  Soft boiled eggs, gluten-free toast with butter, and milk    · Morning snack:  Gluten-free rice cakes or crackers    · Lunch:  Tuna salad with tomato and lettuce, and an apple     · Afternoon snack:  Carrot sticks    · Dinner:  Broiled chicken, baked potato, green beans, and sorbet with strawberries    What can I do to make a gluten-free diet part of my lifestyle? · Follow up with your healthcare provider or dietitian as directed  It may take time to learn how to properly follow a gluten-free diet  Your dietitian can help you find ways to fit this diet into your lifestyle  · Learn to read food labels  Gluten is found in many foods and drinks  It may not be clear which foods contain gluten  Include a variety of allowed foods so that you can get all the nutrients you need  · Prepare for restaurant meals  Carry a list of items allowed on this diet with you when you are away from home  Go to the restaurant's website or call ahead to find out if the restaurant has gluten-free meals  Tell the  that you cannot eat wheat, rye, or barley  Ask how food is prepared  · Prepare gluten-free foods separately from other foods  Cross-contamination is when foods that contain gluten get mixed in with gluten-free foods  Prevent cross contamination by cleaning counter tops and cutting boards well to remove any crumbs that contain gluten  Wash cooking utensils, colanders, and pans well before you cook gluten-free foods  Buy a separate toaster to use for gluten-free breads   Buy separate jam, jelly, mayonnaise, or peanut butter to use on gluten-free breads to avoid cross contamination  These foods are also available in squeeze containers  · Include naturally gluten-free foods that are high in iron, folate, and vitamin B12  Foods high in iron and vitamin B12 include meat, fish, poultry (chicken and fish), liver, and eggs  Foods high in folate include broccoli, asparagus, orange juice, legumes, peanuts, walnuts, and sunflower seeds  · Ask your healthcare provider if you need a vitamin and mineral supplement  Celiac disease and dermatitis herpetiformis can cause damage to your intestines  This damage can decrease the absorption of certain vitamins and minerals  Also, many gluten-free cereals, pastas, and breads are not fortified with vitamin B and iron  When should I contact my healthcare provider? · You must take a medicine or vitamin that contains gluten  · Signs and symptoms of your condition get worse  · You are losing weight, without trying  · You have questions or concerns about your condition or care  CARE AGREEMENT:   You have the right to help plan your care  Discuss treatment options with your healthcare provider to decide what care you want to receive  You always have the right to refuse treatment  The above information is an  only  It is not intended as medical advice for individual conditions or treatments  Talk to your doctor, nurse or pharmacist before following any medical regimen to see if it is safe and effective for you  © Copyright [employer] 2021 Information is for End User's use only and may not be sold, redistributed or otherwise used for commercial purposes   All illustrations and images included in CareNotes® are the copyrighted property of A D A [employer] , Inc  or [address] [place] [address]

## 2021-08-20 NOTE — H&P
History and Physical - [place] [address] [facility]  Clarita Robertson 48 y o  male MRN: [id number]      HPI: Clarita Robertson is a 48y o  year old male who presents for abnormal celiac panel  REVIEW OF SYSTEMS: Per the HPI, and otherwise unremarkable      Historical Information   Past Medical History:   Diagnosis Date    Anterolateral myocardial infarction [facility])     last assessed 10/30/17    Chest pain     Coronary artery disease     Glaucoma     Heart disease     Hypercholesterolemia     Hypertension     Myocardial infarction ([address] )     Uveitis     Visual impairment      Past Surgical History:   Procedure Laterality Date    APPENDECTOMY      CARDIAC CATHETERIZATION      CARDIAC SURGERY      CORONARY STENT PLACEMENT      EYE SURGERY      TONSILLECTOMY       Social History   Social History     Substance and Sexual Activity   Alcohol Use No     Social History     Substance and Sexual Activity   Drug Use Yes    Types: Marijuana     Social History     Tobacco Use   Smoking Status Former Smoker   Smokeless Tobacco Never Used     Family History   Problem Relation Age of Onset    Blindness Mother         retinitis pigmentosis    Heart attack Father         1st heart attack at age 28- had CABG in past    Diabetes Father     Blindness Maternal Aunt         retinitis pigmentosis    Dislocations Family     Heart disease Family     Hypertension Family     Mental illness Neg Hx     Substance Abuse Neg Hx     Colon cancer Neg Hx     Colon polyps Neg Hx        Meds/Allergies       Current Outpatient Medications:     aspirin (ECOTRIN LOW STRENGTH) 81 mg EC tablet    atorvastatin (LIPITOR) 40 mg tablet    Cholecalciferol (VITAMIN D3) 2000 units TABS    [employer] PO    dorzolamide-timolol (COSOPT) 22 3-6 8 MG/ML ophthalmic solution    fluorometholone (FML) 0 1 % ophthalmic suspension    fluticasone (FLONASE) 50 mcg/act nasal spray    lisinopril (ZESTRIL) 20 mg tablet    metoprolol succinate (TOPROL-XL) 50 mg 24 hr tablet    pantoprazole (PROTONIX) 40 mg tablet    dorzolamide (TRUSOPT) 2 % ophthalmic solution    nitroglycerin (Nitrostat) 0 4 mg SL tablet    Current Facility-Administered Medications:     lactated ringers infusion, 50 mL/hr, Intravenous, Continuous, 50 mL/hr at 08/20/21 1021    Allergies   Allergen Reactions    Penicillins Anaphylaxis and Other (See Comments)    Azithromycin      Annotation - [id number]: Listed in old chart   Latex Rash       Objective     /87   Pulse 56   Temp 98 4 °F (36 9 °C) (Temporal)   Resp 16   SpO2 98%       PHYSICAL EXAM    Gen: NAD AAOx3  Head: Normocephalic, Atraumatic  CV: S1S2 RRR no m/r/g  CHEST: Clear b/l no c/r/w  ABD: soft, +BS NT/ND no masses  EXT: no edema      ASSESSMENT/PLAN:  This is a 48y o  year old male here for EGD, and he is stable and optimized for his procedure

## 2021-08-20 NOTE — ANESTHESIA POSTPROCEDURE EVALUATION
Post-Op Assessment Note    CV Status:  Stable  Pain Score: 0    Pain management: adequate     Mental Status:  Sleepy   Hydration Status:  Euvolemic   PONV Controlled:  None   Airway Patency:  Patent      Post Op Vitals Reviewed: Yes      Staff: Anesthesiologist, CRNA   Comments: report given to RN; VSS        No complications documented      /68 (08/20/21 1103)    Temp      Pulse 70 (08/20/21 1103)   Resp 20 (08/20/21 1103)    SpO2 95 % (08/20/21 1103)

## 2021-08-26 ENCOUNTER — TELEPHONE (OUTPATIENT)
Dept: GASTROENTEROLOGY | Facility: CLINIC | Age: 46
End: 2021-08-26

## 2021-08-26 NOTE — TELEPHONE ENCOUNTER
Pt states he got a mssg that there was no evidence of celiac but he is supposed to stay on the diet  He would like to review/have explained why if there is no disease he should stay on the diet  CB# is [phone] but [place] "[place] the woods" and gets bad reception  I could not find note/assumed GS called him

## 2021-08-27 NOTE — TELEPHONE ENCOUNTER
I contacted patient and reviewed that celiac was negative on pathology but on allergy testing in May there was noted allergy to wheat though low equivocal we would like to monitor how you feel following gluten free diet

## 2021-10-14 ENCOUNTER — TELEPHONE (OUTPATIENT)
Dept: GASTROENTEROLOGY | Facility: CLINIC | Age: 46
End: 2021-10-14

## 2021-10-15 ENCOUNTER — OFFICE VISIT (OUTPATIENT)
Dept: GASTROENTEROLOGY | Facility: CLINIC | Age: 46
End: 2021-10-15
Payer: COMMERCIAL

## 2021-10-15 VITALS
SYSTOLIC BLOOD PRESSURE: 118 MMHG | HEART RATE: 70 BPM | HEIGHT: 68 IN | WEIGHT: 170 LBS | BODY MASS INDEX: 25.76 KG/M2 | DIASTOLIC BLOOD PRESSURE: 82 MMHG

## 2021-10-15 DIAGNOSIS — K90.0 CELIAC DISEASE: Primary | ICD-10-CM

## 2021-10-15 DIAGNOSIS — K64.2 GRADE III HEMORRHOIDS: ICD-10-CM

## 2021-10-15 DIAGNOSIS — R89.4 ABNORMAL CELIAC ANTIBODY PANEL: ICD-10-CM

## 2021-10-15 DIAGNOSIS — R10.12 LUQ PAIN: ICD-10-CM

## 2021-10-15 PROCEDURE — 3008F BODY MASS INDEX DOCD: CPT | Performed by: INTERNAL MEDICINE

## 2021-10-15 PROCEDURE — 1036F TOBACCO NON-USER: CPT | Performed by: INTERNAL MEDICINE

## 2021-10-15 PROCEDURE — 46221 LIGATION OF HEMORRHOID(S): CPT | Performed by: INTERNAL MEDICINE

## 2021-10-15 PROCEDURE — 99213 OFFICE O/P EST LOW 20 MIN: CPT | Performed by: INTERNAL MEDICINE

## 2021-12-02 ENCOUNTER — OFFICE VISIT (OUTPATIENT)
Dept: GASTROENTEROLOGY | Facility: CLINIC | Age: 46
End: 2021-12-02
Payer: COMMERCIAL

## 2021-12-02 VITALS
HEART RATE: 83 BPM | SYSTOLIC BLOOD PRESSURE: 120 MMHG | HEIGHT: 68 IN | WEIGHT: 172 LBS | DIASTOLIC BLOOD PRESSURE: 82 MMHG | BODY MASS INDEX: 26.07 KG/M2

## 2021-12-02 DIAGNOSIS — K64.2 GRADE III HEMORRHOIDS: Primary | ICD-10-CM

## 2021-12-02 PROCEDURE — 46221 LIGATION OF HEMORRHOID(S): CPT | Performed by: INTERNAL MEDICINE

## 2021-12-20 ENCOUNTER — OFFICE VISIT (OUTPATIENT)
Dept: FAMILY MEDICINE CLINIC | Facility: HOSPITAL | Age: 46
End: 2021-12-20
Payer: COMMERCIAL

## 2021-12-20 VITALS
OXYGEN SATURATION: 99 % | BODY MASS INDEX: 26.19 KG/M2 | DIASTOLIC BLOOD PRESSURE: 76 MMHG | WEIGHT: 172.8 LBS | TEMPERATURE: 98.2 F | HEIGHT: 68 IN | HEART RATE: 92 BPM | SYSTOLIC BLOOD PRESSURE: 126 MMHG

## 2021-12-20 DIAGNOSIS — S30.861A TICK BITE OF ABDOMINAL WALL, INITIAL ENCOUNTER: Primary | ICD-10-CM
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
## 2021-12-20 PROCEDURE — 99213 OFFICE O/P EST LOW 20 MIN: CPT | Performed by: NURSE PRACTITIONER

## 2021-12-20 PROCEDURE — 1036F TOBACCO NON-USER: CPT | Performed by: NURSE PRACTITIONER

## 2021-12-21 ENCOUNTER — OFFICE VISIT (OUTPATIENT)
Dept: GASTROENTEROLOGY | Facility: CLINIC | Age: 46
End: 2021-12-21
Payer: COMMERCIAL

## 2021-12-21 VITALS
WEIGHT: 172 LBS | BODY MASS INDEX: 26.07 KG/M2 | SYSTOLIC BLOOD PRESSURE: 126 MMHG | DIASTOLIC BLOOD PRESSURE: 82 MMHG | HEIGHT: 68 IN

## 2021-12-21 DIAGNOSIS — I10 ESSENTIAL HYPERTENSION: ICD-10-CM

## 2021-12-21 DIAGNOSIS — K64.2 GRADE III HEMORRHOIDS: Primary | ICD-10-CM

## 2021-12-21 PROCEDURE — 3008F BODY MASS INDEX DOCD: CPT | Performed by: NURSE PRACTITIONER

## 2021-12-21 PROCEDURE — 46221 LIGATION OF HEMORRHOID(S): CPT | Performed by: INTERNAL MEDICINE

## 2021-12-22 RX ORDER — METOPROLOL SUCCINATE 50 MG/1
TABLET, EXTENDED RELEASE ORAL
Qty: 30 TABLET | Refills: 0 | Status: SHIPPED | OUTPATIENT
Start: 2021-12-22 | End: 2022-01-25

## 2022-01-20 ENCOUNTER — OFFICE VISIT (OUTPATIENT)
Dept: FAMILY MEDICINE CLINIC | Facility: HOSPITAL | Age: 47
End: 2022-01-20
Payer: COMMERCIAL

## 2022-01-20 VITALS
SYSTOLIC BLOOD PRESSURE: 120 MMHG | HEIGHT: 68 IN | HEART RATE: 85 BPM | WEIGHT: 173.6 LBS | DIASTOLIC BLOOD PRESSURE: 78 MMHG | OXYGEN SATURATION: 97 % | BODY MASS INDEX: 26.31 KG/M2

## 2022-01-20 DIAGNOSIS — R10.12 LEFT UPPER QUADRANT ABDOMINAL PAIN: Primary | ICD-10-CM

## 2022-01-20 PROBLEM — R21 RASH: Status: RESOLVED | Noted: 2021-02-19 | Resolved: 2022-01-20

## 2022-01-20 PROCEDURE — 99214 OFFICE O/P EST MOD 30 MIN: CPT | Performed by: INTERNAL MEDICINE

## 2022-01-20 PROCEDURE — 1036F TOBACCO NON-USER: CPT | Performed by: INTERNAL MEDICINE

## 2022-01-20 PROCEDURE — 3725F SCREEN DEPRESSION PERFORMED: CPT | Performed by: INTERNAL MEDICINE

## 2022-01-20 PROCEDURE — 3008F BODY MASS INDEX DOCD: CPT | Performed by: INTERNAL MEDICINE

## 2022-01-20 NOTE — PROGRESS NOTES
Assessment/Plan:    No problem-specific Assessment & Plan notes found for this encounter  Diagnoses and all orders for this visit:    Left upper quadrant abdominal pain  -     CBC and differential; Future  -     Comprehensive metabolic panel; Future  -     C-reactive protein; Future  -     Sedimentation rate, automated; Future  -     Urinalysis with microscopic; Future        I doubt that patient has celiac disease and I advised him to return to non celiac diet  I doubt that patient has cancer, chronic infection  I doubt  angina presenting with left upper quadrant/lower rib pain  Patient is physically very active and moves sometimes 40,000 lb without chest pain or shortness of breath  I see no indication to repeat CT of the abdomen but will get CBC, CMP, UA, CRP and ESR for completeness  I I think patient's discomfort is of chest wall origin, possible neuropathy  Will try Tylenol 1000 mg 3 times a day  If ineffective will try diclofenac, if ineffective will try Cymbalta  Subjective:      Patient ID: Flavio Cifuentes is a 48 y o  male  Patient presents with chief complaint of left upper quadrant/left lower rib type of discomfort  He woke up 1 day about 2 years ago and felt a pressure-like feeling in the left upper quadrant/left lower anterior rib area  It is nonradiating feeling that sometimes feels like a pressure sometimes a burning sensation, has been intermittent may come and go 40 times a day, is relieved when he lays down and he sleeps  It also is somewhat better after rubbing the area but not consistently  There is no provoking or exacerbating factors  Specifically he denies any worsening of the feeling with eating, fasting, inspiration, motions of the trunk or the back, exertion, urination, defecation      He denies any weight loss, fevers, chills, night sweats, trauma, rash, ulcers, thoracic back pain, dyspnea, palpitations, pleurisy, nausea, dysphagia, dysuria, signs of GI or  bleeding  Four months ago the feeling became more frequent and more severe  Prior to that the feeling was hurting less frequently left severely  He had an EGD that showed no duodenal ulcers or significant gastritis  CT of the abdomen was results reviewed from December 2020 showed no splenomegaly, pancreatic mass, liver mass, retroperitoneal mass or renal stones  Patient's CBC was normal last year  Select panel showed very mildly increased tissue transglutaminase level  Patient has symptoms initially improved on a select diet but later it seems to have made no difference whether he was consuming a select diet or not  Patient does not feel overly anxious or nervous  He present today asking me whether he has celiac disease and what causes the left upper quadrant/lower anterior rib cage discomfort  The following portions of the patient's history were reviewed and updated as appropriate: current medications, past family history, past medical history, past social history, past surgical history and problem list     Review of Systems      Objective:    /78   Pulse 85   Ht 5' 8" (1 727 m)   Wt 78 7 kg (173 lb 9 6 oz)   SpO2 97%   BMI 26 40 kg/m²      Physical Exam  Constitutional:       General: He is not in acute distress  Appearance: He is not ill-appearing or toxic-appearing  Eyes:      Conjunctiva/sclera: Conjunctivae normal    Cardiovascular:      Rate and Rhythm: Normal rate  Heart sounds: No murmur heard  Pulmonary:      Effort: No respiratory distress  Breath sounds: No wheezing or rales  Abdominal:      General: Bowel sounds are normal  There is no distension  Palpations: Abdomen is soft  There is no mass  Tenderness: There is no abdominal tenderness  There is no right CVA tenderness, left CVA tenderness or guarding  Hernia: No hernia is present     Musculoskeletal:         General: No tenderness (Patient has no spinous process tenderness, no tenderness of the cervical or thoracic paraspinal muscles the left side)  Comments: He does have some tenderness on palpation of the lower edge of the left ribcage  The ribs themselves are not tender on palpation   Skin:     General: Skin is warm and dry  Findings: No bruising, erythema, lesion or rash  Neurological:      Mental Status: He is alert and oriented to person, place, and time  Motor: No weakness  Gait: Gait normal    Psychiatric:         Mood and Affect: Mood normal          Behavior: Behavior normal          Thought Content:  Thought content normal          Judgment: Judgment normal              Renetta Sinha MD

## 2022-01-22 LAB
ALBUMIN SERPL-MCNC: 4.6 G/DL (ref 4–5)
ALBUMIN/GLOB SERPL: 2.4 {RATIO} (ref 1.2–2.2)
ALP SERPL-CCNC: 67 IU/L (ref 44–121)
ALT SERPL-CCNC: 26 IU/L (ref 0–44)
APPEARANCE UR: CLEAR
AST SERPL-CCNC: 26 IU/L (ref 0–40)
BACTERIA URNS QL MICRO: NORMAL
BASOPHILS # BLD AUTO: 0 X10E3/UL (ref 0–0.2)
BASOPHILS NFR BLD AUTO: 1 %
BILIRUB SERPL-MCNC: 0.6 MG/DL (ref 0–1.2)
BILIRUB UR QL STRIP: NEGATIVE
BUN SERPL-MCNC: 16 MG/DL (ref 6–24)
BUN/CREAT SERPL: 16 (ref 9–20)
CALCIUM SERPL-MCNC: 9.5 MG/DL (ref 8.7–10.2)
CASTS URNS QL MICRO: NORMAL /LPF
CHLORIDE SERPL-SCNC: 101 MMOL/L (ref 96–106)
CO2 SERPL-SCNC: 24 MMOL/L (ref 20–29)
COLOR UR: YELLOW
CREAT SERPL-MCNC: 0.97 MG/DL (ref 0.76–1.27)
CRP SERPL-MCNC: <1 MG/L (ref 0–10)
EOSINOPHIL # BLD AUTO: 0.1 X10E3/UL (ref 0–0.4)
EOSINOPHIL NFR BLD AUTO: 1 %
EPI CELLS #/AREA URNS HPF: NORMAL /HPF (ref 0–10)
ERYTHROCYTE [DISTWIDTH] IN BLOOD BY AUTOMATED COUNT: 12.2 % (ref 11.6–15.4)
ERYTHROCYTE [SEDIMENTATION RATE] IN BLOOD BY WESTERGREN METHOD: 2 MM/HR (ref 0–15)
GLOBULIN SER-MCNC: 1.9 G/DL (ref 1.5–4.5)
GLUCOSE SERPL-MCNC: 103 MG/DL (ref 65–99)
GLUCOSE UR QL: NEGATIVE
HCT VFR BLD AUTO: 47.7 % (ref 37.5–51)
HGB BLD-MCNC: 15.7 G/DL (ref 13–17.7)
HGB UR QL STRIP: NEGATIVE
IMM GRANULOCYTES # BLD: 0 X10E3/UL (ref 0–0.1)
IMM GRANULOCYTES NFR BLD: 0 %
KETONES UR QL STRIP: NEGATIVE
LEUKOCYTE ESTERASE UR QL STRIP: NEGATIVE
LYMPHOCYTES # BLD AUTO: 2.3 X10E3/UL (ref 0.7–3.1)
LYMPHOCYTES NFR BLD AUTO: 39 %
MCH RBC QN AUTO: 28.9 PG (ref 26.6–33)
MCHC RBC AUTO-ENTMCNC: 32.9 G/DL (ref 31.5–35.7)
MCV RBC AUTO: 88 FL (ref 79–97)
MICRO URNS: NORMAL
MICRO URNS: NORMAL
MONOCYTES # BLD AUTO: 0.5 X10E3/UL (ref 0.1–0.9)
MONOCYTES NFR BLD AUTO: 9 %
NEUTROPHILS # BLD AUTO: 2.9 X10E3/UL (ref 1.4–7)
NEUTROPHILS NFR BLD AUTO: 50 %
NITRITE UR QL STRIP: NEGATIVE
PH UR STRIP: 6 [PH] (ref 5–7.5)
PLATELET # BLD AUTO: 301 X10E3/UL (ref 150–450)
POTASSIUM SERPL-SCNC: 4.9 MMOL/L (ref 3.5–5.2)
PROT SERPL-MCNC: 6.5 G/DL (ref 6–8.5)
PROT UR QL STRIP: NEGATIVE
RBC # BLD AUTO: 5.43 X10E6/UL (ref 4.14–5.8)
RBC #/AREA URNS HPF: NORMAL /HPF (ref 0–2)
SL AMB EGFR AFRICAN AMERICAN: 106 ML/MIN/1.73
SL AMB EGFR NON AFRICAN AMERICAN: 92 ML/MIN/1.73
SODIUM SERPL-SCNC: 139 MMOL/L (ref 134–144)
SP GR UR: 1.02 (ref 1–1.03)
UROBILINOGEN UR STRIP-ACNC: 0.2 MG/DL (ref 0.2–1)
WBC # BLD AUTO: 5.8 X10E3/UL (ref 3.4–10.8)
WBC #/AREA URNS HPF: NORMAL /HPF (ref 0–5)

## 2022-01-25 DIAGNOSIS — I10 ESSENTIAL HYPERTENSION: ICD-10-CM

## 2022-01-25 RX ORDER — METOPROLOL SUCCINATE 50 MG/1
TABLET, EXTENDED RELEASE ORAL
Qty: 30 TABLET | Refills: 0 | Status: SHIPPED | OUTPATIENT
Start: 2022-01-25 | End: 2022-02-22

## 2022-02-10 ENCOUNTER — OFFICE VISIT (OUTPATIENT)
Dept: FAMILY MEDICINE CLINIC | Facility: HOSPITAL | Age: 47
End: 2022-02-10
Payer: COMMERCIAL

## 2022-02-10 VITALS
BODY MASS INDEX: 25.76 KG/M2 | DIASTOLIC BLOOD PRESSURE: 82 MMHG | SYSTOLIC BLOOD PRESSURE: 110 MMHG | HEART RATE: 86 BPM | HEIGHT: 68 IN | OXYGEN SATURATION: 97 % | WEIGHT: 170 LBS | RESPIRATION RATE: 16 BRPM

## 2022-02-10 DIAGNOSIS — R10.12 LUQ PAIN: Primary | ICD-10-CM

## 2022-02-10 PROBLEM — R89.4 ABNORMAL CELIAC ANTIBODY PANEL: Status: RESOLVED | Noted: 2021-07-30 | Resolved: 2022-02-10

## 2022-02-10 PROCEDURE — 99213 OFFICE O/P EST LOW 20 MIN: CPT | Performed by: INTERNAL MEDICINE

## 2022-02-10 PROCEDURE — 1036F TOBACCO NON-USER: CPT | Performed by: INTERNAL MEDICINE

## 2022-02-10 NOTE — PROGRESS NOTES
Assessment/Plan:    No problem-specific Assessment & Plan notes found for this encounter  Diagnoses and all orders for this visit:    LUQ pain          Patient has pain is due to musculoskeletal causes of the lower edge of ribcage on the left side  He will continue taking Tylenol as needed and resting when he is not working  This discomfort is not cardiac, pulmonary, GI in origin  I advised patient to continue gluten free diet  I asked him to call if there is any new developments or any change in his symptoms  Subjective:      Patient ID: Chen Salcido is a 48 y o  male  Patient presents for follow-up of left sided lower chest wall pain  He still has episodic flares of this feeling following shoveling snow or lifting  It is better when he rests or he rubs his left lower anterior rib cage  Tylenol also helps  His discomfort did not get worse after discontinuing gluten free diet    We reviewed labs that revealed no anemia, no leukocytosis normal CRP and ESR, no hematuria  The following portions of the patient's history were reviewed and updated as appropriate: current medications, past family history, past medical history, past social history, past surgical history and problem list     Review of Systems      Objective:    /82   Pulse 86   Resp 16   Ht 5' 8" (1 727 m)   Wt 77 1 kg (170 lb)   SpO2 97%   BMI 25 85 kg/m²      Physical Exam  Constitutional:       General: He is not in acute distress  HENT:      Head: Normocephalic  Cardiovascular:      Rate and Rhythm: Normal rate and regular rhythm  Heart sounds: No murmur heard  Pulmonary:      Effort: No respiratory distress  Breath sounds: No wheezing or rales  Abdominal:      General: Bowel sounds are normal       Tenderness: There is no abdominal tenderness (Patient has no left upper quadrant tenderness or masses felt)     Musculoskeletal:         General: Tenderness ( patient has tenderness of the edge of the ribcage on the left side) present  Skin:     Findings: No rash ( there is no rash, subcutaneous mass, hematoma at left lower edge of the ribcage)  Neurological:      Mental Status: He is alert               Cuauhtemoc Moyer MD

## 2022-02-22 DIAGNOSIS — I10 ESSENTIAL HYPERTENSION: ICD-10-CM

## 2022-02-22 RX ORDER — METOPROLOL SUCCINATE 50 MG/1
TABLET, EXTENDED RELEASE ORAL
Qty: 30 TABLET | Refills: 0 | Status: SHIPPED | OUTPATIENT
Start: 2022-02-22 | End: 2022-03-28

## 2022-03-28 DIAGNOSIS — I10 ESSENTIAL HYPERTENSION: ICD-10-CM

## 2022-03-28 RX ORDER — METOPROLOL SUCCINATE 50 MG/1
TABLET, EXTENDED RELEASE ORAL
Qty: 30 TABLET | Refills: 0 | Status: SHIPPED | OUTPATIENT
Start: 2022-03-28 | End: 2022-04-21

## 2022-04-21 DIAGNOSIS — I10 ESSENTIAL HYPERTENSION: ICD-10-CM

## 2022-04-21 RX ORDER — METOPROLOL SUCCINATE 50 MG/1
TABLET, EXTENDED RELEASE ORAL
Qty: 30 TABLET | Refills: 0 | Status: SHIPPED | OUTPATIENT
Start: 2022-04-21 | End: 2022-04-27 | Stop reason: SDUPTHER

## 2022-04-27 DIAGNOSIS — I10 ESSENTIAL HYPERTENSION: ICD-10-CM

## 2022-04-27 RX ORDER — METOPROLOL SUCCINATE 50 MG/1
50 TABLET, EXTENDED RELEASE ORAL DAILY
Qty: 90 TABLET | Refills: 3 | Status: SHIPPED | OUTPATIENT
Start: 2022-04-27

## 2022-06-29 ENCOUNTER — OFFICE VISIT (OUTPATIENT)
Dept: FAMILY MEDICINE CLINIC | Facility: HOSPITAL | Age: 47
End: 2022-06-29
Payer: OTHER MISCELLANEOUS

## 2022-06-29 ENCOUNTER — HOSPITAL ENCOUNTER (OUTPATIENT)
Dept: RADIOLOGY | Facility: HOSPITAL | Age: 47
Discharge: HOME | End: 2022-06-29
Attending: STUDENT IN AN ORGANIZED HEALTH CARE EDUCATION/TRAINING PROGRAM
Payer: OTHER MISCELLANEOUS

## 2022-06-29 VITALS
DIASTOLIC BLOOD PRESSURE: 84 MMHG | WEIGHT: 177 LBS | HEIGHT: 68 IN | OXYGEN SATURATION: 96 % | SYSTOLIC BLOOD PRESSURE: 118 MMHG | BODY MASS INDEX: 26.83 KG/M2 | HEART RATE: 61 BPM

## 2022-06-29 DIAGNOSIS — S46.101A INJURY OF TENDON OF LONG HEAD OF RIGHT BICEPS, INITIAL ENCOUNTER: ICD-10-CM

## 2022-06-29 DIAGNOSIS — I11.0 HYPERTENSIVE HEART DISEASE WITH HEART FAILURE (HCC): ICD-10-CM

## 2022-06-29 DIAGNOSIS — S46.011A TRAUMATIC INCOMPLETE TEAR OF RIGHT ROTATOR CUFF, INITIAL ENCOUNTER: ICD-10-CM

## 2022-06-29 DIAGNOSIS — M25.511 ACUTE PAIN OF RIGHT SHOULDER: Primary | ICD-10-CM

## 2022-06-29 DIAGNOSIS — M25.511 ACUTE PAIN OF RIGHT SHOULDER: ICD-10-CM

## 2022-06-29 PROCEDURE — 73030 X-RAY EXAM OF SHOULDER: CPT

## 2022-06-29 PROCEDURE — 99214 OFFICE O/P EST MOD 30 MIN: CPT | Performed by: STUDENT IN AN ORGANIZED HEALTH CARE EDUCATION/TRAINING PROGRAM

## 2022-06-29 RX ORDER — NAPROXEN 500 MG/1
500 TABLET ORAL 2 TIMES DAILY WITH MEALS
Qty: 30 TABLET | Refills: 1 | Status: SHIPPED | OUTPATIENT
Start: 2022-06-29

## 2022-06-29 NOTE — PROGRESS NOTES
[facility],     Assessment/Plan:      Diagnosis ICD-10-CM Associated Orders   1  Acute pain of right shoulder  M25 511 XR shoulder 2+ vw right     Ambulatory Referral to Physical Therapy     naproxen (Naprosyn) 500 mg tablet   2  Traumatic incomplete tear of right rotator cuff, initial encounter  S46 011A XR shoulder 2+ vw right     Ambulatory Referral to Physical Therapy     naproxen (Naprosyn) 500 mg tablet   3  Injury of tendon of long head of right biceps, initial encounter  S46 101A XR shoulder 2+ vw right     Ambulatory Referral to Physical Therapy     naproxen (Naprosyn) 500 mg tablet   4  Hypertensive heart disease with heart failure (HCC)  I11 0       PT ordered as well as XR R shoulder - concern for RTC tear   Naprosyn for pain and inflammation  Return in about 6 weeks (around 8/10/2022) for F/U after trial of physical therapy   Patient may call or return to office with any questions or concerns  ______________________________________________________________________  Subjective:     Patient ID: Mari Tripp is a 50 y o  male  HPI  Mari Tripp  Chief Complaint   Patient presents with    Shoulder Pain     Right shoulder     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 caught  Landed on both arms outstretched  Wrists hurt initially, better now  RHD  A few days ago sharp, severe pain, & again last night  Into arm, & the shoulder  Had to start using aleve  No prior XR imaging  Worker's Comp - pulverizing materials  No prior shoulder injury or surgery  The following portions of the patient's history were reviewed and updated as appropriate: allergies, current medications, past medical history, past surgical history and problem list     Review of Systems   Constitutional: Negative for chills and fever  Respiratory: Negative for cough and shortness of breath  Cardiovascular: Negative for chest pain and palpitations  Musculoskeletal: Positive for arthralgias  Negative for back pain  Objective:      Vitals:    06/29/22 1011   BP: 118/84   Pulse: 61   SpO2: 96%      Physical Exam  Vitals reviewed  Constitutional:       General: He is not in acute distress  Appearance: Normal appearance  He is well-developed  He is not ill-appearing  HENT:      Head: Normocephalic and atraumatic  Eyes:      General: No scleral icterus  Right eye: No discharge  Left eye: No discharge  Cardiovascular:      Rate and Rhythm: Normal rate and regular rhythm  Pulses: Normal pulses  Heart sounds: Normal heart sounds  No murmur heard  Pulmonary:      Effort: Pulmonary effort is normal  No respiratory distress  Breath sounds: Normal breath sounds  No stridor  No wheezing  Musculoskeletal:      Cervical back: Normal range of motion  Skin:     General: Skin is warm and dry  Neurological:      Mental Status: He is alert and oriented to person, place, and time  Psychiatric:         Mood and Affect: Mood normal          Behavior: Behavior normal          Thought Content: Thought content normal          Judgment: Judgment normal        RIGHT SHOULDER:  Erythema: no  Swelling: no  Increased Warmth: no    Tenderness: RTC insertion    ROM  Touchdown sign: intact, end limited by pain  External Rotation: Intact  Internal Rotation: Intact    Strength  Abduction: 4/5  ER: 5/5  IR: 5/5    Drop-Arm: negative  Emptycan: +  Belly Press: negative    Blanca: +  Neer: negative  Speeds: +    LEFT SHOULDER:  Strength  Abduction: 5/5  ER: 5/5  IR: 5/5    ROM  Touchdown sign: intact  Empty can: negative      Portions of the record may have been created with voice recognition software  Occasional wrong word or "sound alike" substitutions may have occurred due to the inherent limitations of voice recognition software   Please review the chart carefully and recognize, using context, where substitutions/typographical errors may have occurred

## 2022-06-30 ENCOUNTER — EVALUATION (OUTPATIENT)
Dept: PHYSICAL THERAPY | Facility: CLINIC | Age: 47
End: 2022-06-30
Payer: OTHER MISCELLANEOUS

## 2022-06-30 DIAGNOSIS — G89.29 CHRONIC RIGHT SHOULDER PAIN: Primary | ICD-10-CM

## 2022-06-30 DIAGNOSIS — M25.511 CHRONIC RIGHT SHOULDER PAIN: Primary | ICD-10-CM

## 2022-06-30 DIAGNOSIS — M25.511 ACUTE PAIN OF RIGHT SHOULDER: ICD-10-CM

## 2022-06-30 DIAGNOSIS — S46.011A TRAUMATIC INCOMPLETE TEAR OF RIGHT ROTATOR CUFF, INITIAL ENCOUNTER: ICD-10-CM

## 2022-06-30 DIAGNOSIS — S46.101A INJURY OF TENDON OF LONG HEAD OF RIGHT BICEPS, INITIAL ENCOUNTER: ICD-10-CM

## 2022-06-30 PROCEDURE — 97140 MANUAL THERAPY 1/> REGIONS: CPT | Performed by: PHYSICAL THERAPIST

## 2022-06-30 PROCEDURE — 97161 PT EVAL LOW COMPLEX 20 MIN: CPT | Performed by: PHYSICAL THERAPIST

## 2022-06-30 NOTE — PROGRESS NOTES
PT Evaluation     Today's date: 2022  Patient name: Stefan Araujo  : 1975  MRN: [id number]  Referring provider: Merlinda Ko, DO  Dx:   Encounter Diagnosis     ICD-10-CM    1  Chronic right shoulder pain  M25 511     G89 29                   Assessment  Assessment details: Stefan Araujo is a 48 y o  male presenting to outpatient physical therapy at [place] [address] with complaints of R shoulder pain   They present with decreased range of motion, decreased strength, limited flexibility, poor postural awareness, poor body mechanics, poor balance, decreased tolerance to activity and decreased functional mobility due to Chronic right shoulder pain  (primary encounter diagnosis)  Kristi Foote would benefit from skilled physical therapy to address noted impairments in order to allow for full functional return to work and ADL-related activities  Thank you for the referral!  Impairments: abnormal muscle firing, abnormal or restricted ROM, activity intolerance, impaired balance, impaired physical strength, lacks appropriate home exercise program, pain with function and poor body mechanics  Barriers to therapy: n/a  Understanding of Dx/Px/POC: excellent  Goals  ST   Independent with HEP in 2 weeks  2  Decrease pain by 50% in 3 wks  3   Increase R shoulder flexion ROM to 140 degrees in 3 weeks     LT  Achieve FOTO score of 74/100 in 6 weeks   2   Able to lift 25# OH in 6 weeks  3  Strength = 5/5 R shoulder all planes in 6 weeks      Plan  Plan details: RE in 6 weeks    Patient would benefit from: skilled physical therapy  Planned modality interventions: cryotherapy, electrical stimulation/Russian stimulation and thermotherapy: hydrocollator packs  Planned therapy interventions: abdominal trunk stabilization, manual therapy, neuromuscular re-education, therapeutic activities, therapeutic exercise, body mechanics training and home exercise program  Frequency: 1x week  Duration in weeks: 6  Plan of Care beginning date: 6/30/2022  Plan of Care expiration date: 8/12/2022  Treatment plan discussed with: patient        Subjective Evaluation    History of Present Illness  Mechanism of injury: Pt c/o R shoulder and arm pain  3 months ago Pt reports he was getting off a truck at work and his feet got caught up together, causing him to fall  Pt landed on his hands  Notes a jarring feeling to the shoulders, but denies feeling a pop at the time of injury  Subsequent R shoulder pain  Denies head trauma  The shoulder was getting better, though 1 week ago he started having severe shooting pains that originate in the shoulder joint and radiate down the back of the arm  Rated 7/10  Denies radiation past the elbow, neck pain, RUE N/T  Positive for weakness, but pt reports this is secondary to babying the shoulder for 3 months  Agg with reaching overhead, handstands, frisbee  Pain is eased with 500mg aleve (taken daily) and rest      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[employer], which involves frequent lifting and rotating  He is currently working  Main concern with the shoulder is that something is damaged and could affect his ability to work  Main goal for therapy is to reduce pain        Patient Goals  Patient goals for therapy: decreased edema, decreased pain, improved balance, increased motion, return to work, return to [employer] activities, independence with ADLs/IADLs and increased strength          Objective     Postural Observations    Additional Postural Observation Details  Rounded shoulders bilat    Tenderness     Additional Tenderness Details  TTP with increased tone R UT, LS, pec    (-) painful arc  (-) full, empty can  (+) rodriguez  (-) biceps load  (+) belly press    Cervical/Thoracic Screen   Cervical range of motion within normal limits with the following exceptions: R PROM lower cervical sidebending < L  CTJ restricted  Thoracic range of motion within normal limits    Neurological Testing Sensation     Shoulder   Left Shoulder   Intact: light touch    Right Shoulder   Intact: light touch    Active Range of Motion   Left Shoulder   Flexion: 145 degrees WFL  Extension: 45 degrees WFL  Abduction: 160 degrees WFL  External rotation 0°: 68 degrees WFL  External rotation BTH: T3 WFL  Internal rotation BTB: T9 WFL    Right Shoulder   Flexion: 140 degrees with pain  Extension: 46 degrees WFL  Abduction: 140 degrees with pain  External rotation 0°: 70 degrees WFL  External rotation BTH: T3 WFL and with pain  Internal rotation BTB: L1 with pain    Strength/Myotome Testing     Left Shoulder   Normal muscle strength    Right Shoulder     Planes of Motion   Flexion: 4+   Extension: 4+   Abduction: 4   Adduction: 5   External rotation at 0°: 4+   Internal rotation at 0°: 4+     Isolated Muscles   Biceps: 5   Lower trapezius: 4+   Middle trapezius: 4+   Serratus anterior: 4+   Upper trapezius: 5              Precautions: potential R partial rotator cuff tear; R shoulder strain  Other factors: h/o CVA  Pt goals: reduced pain in order to stay working  EPOC: 8/12/22      HEP:  Access Code: Faisal Gonzalez  URL: [email or web address]  Date: 06/30/2022  Prepared by: Zheng Weber    Exercises  · Standing Shoulder and Trunk Flexion at Table - 15 reps  · Corner Pec Minor Stretch - 15 reps - 5 sec hold  · Seated Cervical Sidebending Stretch - 15 reps - 5 sec hold  · Standing Shoulder Posterior Capsule Stretch - 15 reps - 5 sec hold  · Backward shoulder rolls - 15 reps            Manuals 6/30            cervical STM THERESA            UT active release THERESA            GHJ inf, AP mob THERESA            R sh' PROM                                       Neuro Re-Ed                                                                                                        Ther Ex             UBE             Doorway pec x15            Sh' flex EOT x15            UT stretch x10 ea            Cross body stretch x10 ea            bwd sh' roll x15            Sh' flex arom             Sh' abd arom             Sh' rot btb arom                                                                                           Ther Activity                                       Gait Training                                       Modalities

## 2022-07-04 DIAGNOSIS — E78.00 HYPERCHOLESTEROLEMIA: ICD-10-CM

## 2022-07-04 DIAGNOSIS — I10 ESSENTIAL HYPERTENSION: ICD-10-CM

## 2022-07-04 RX ORDER — LISINOPRIL 20 MG/1
TABLET ORAL
Qty: 30 TABLET | Refills: 0 | Status: SHIPPED | OUTPATIENT
Start: 2022-07-04 | End: 2022-08-02

## 2022-07-05 ENCOUNTER — TELEPHONE (OUTPATIENT)
Dept: CARDIOLOGY CLINIC | Facility: CLINIC | Age: 47
End: 2022-07-05

## 2022-07-05 RX ORDER — ATORVASTATIN CALCIUM 40 MG/1
TABLET, FILM COATED ORAL
Qty: 30 TABLET | Refills: 0 | Status: SHIPPED | OUTPATIENT
Start: 2022-07-05 | End: 2022-08-02

## 2022-07-12 ENCOUNTER — OFFICE VISIT (OUTPATIENT)
Dept: PHYSICAL THERAPY | Facility: CLINIC | Age: 47
End: 2022-07-12
Payer: OTHER MISCELLANEOUS

## 2022-07-12 DIAGNOSIS — G89.29 CHRONIC RIGHT SHOULDER PAIN: Primary | ICD-10-CM

## 2022-07-12 DIAGNOSIS — M25.511 CHRONIC RIGHT SHOULDER PAIN: Primary | ICD-10-CM

## 2022-07-12 PROCEDURE — 97110 THERAPEUTIC EXERCISES: CPT | Performed by: PHYSICAL THERAPIST

## 2022-07-12 PROCEDURE — 97140 MANUAL THERAPY 1/> REGIONS: CPT | Performed by: PHYSICAL THERAPIST

## 2022-07-12 NOTE — PROGRESS NOTES
Daily Note     Today's date: 2022  Patient name: Mari Tripp  : 1975  MRN: [id number]  Referring provider: Feliciano Gonzalez DO  Dx:   Encounter Diagnosis     ICD-10-CM    1  Chronic right shoulder pain  M25 511     G89 29        Start Time: 1445          Subjective: Feeling a little better  Was able to kayak over vacation  Objective: See treatment diary below      Assessment: 8/10 pain reported with doorway pec stretch  UT, LS, SO, SCM and pec continue to be tight and painful to STM  Pain with joint mob and end range ER stretching  Updated HEP with AROM and took off doorway stretching  Ended with heat for symptom control  Tolerated treatment well  Patient demonstrated fatigue post treatment and would benefit from continued PT      Plan: Continue per plan of care  Precautions: potential R partial rotator cuff tear; R shoulder strain  Other factors: h/o CVA  Pt goals: reduced pain in order to stay working  EPOC: 22      HEP:  Access Code: [email or web address]  URL: [email or web address]  Date: 2022  Prepared by: Bonny Egan    Exercises  · Standing Shoulder and Trunk Flexion at Table - 15 reps - 5 sec hold  · Seated Cervical Sidebending Stretch - 15 reps - 5 sec hold  · Standing Shoulder Posterior Capsule Stretch - 15 reps - 5 sec hold  · Standing Shoulder Abduction Full Range - 20 reps  · Standing Shoulder Flexion Full Range - 20 reps  · Standing Shoulder Internal Rotation AROM Behind Back - 20 reps      Manuals            cervical STM THERESA THERESA           UT active release THERESA THERESA           GHJ inf, AP mob THERESA THERESA           R sh' PROM  THERESA                                     Neuro Re-Ed                                                                                                        Ther Ex             UBE  3'/3'           Doorway pec x15 5"x15 P!            Sh' flex EOT x15 5"x15           UT stretch x10 ea 5"x15 ea           Cross body stretch x10 ea 5"x15 ea bwd sh' roll x15            Supine lat pullover  Legs at 90/90 5# x20           Sh' flex arom  x20           Sh' abd arom  x20           Sh' rot btb arom  x20                                                                                         Ther Activity                                       Gait Training                                       Modalities             P  5'

## 2022-07-18 ENCOUNTER — OFFICE VISIT (OUTPATIENT)
Dept: PHYSICAL THERAPY | Facility: CLINIC | Age: 47
End: 2022-07-18
Payer: OTHER MISCELLANEOUS

## 2022-07-18 DIAGNOSIS — M25.511 ACUTE PAIN OF RIGHT SHOULDER: ICD-10-CM

## 2022-07-18 DIAGNOSIS — G89.29 CHRONIC RIGHT SHOULDER PAIN: Primary | ICD-10-CM

## 2022-07-18 DIAGNOSIS — M25.511 CHRONIC RIGHT SHOULDER PAIN: Primary | ICD-10-CM

## 2022-07-18 PROCEDURE — 97110 THERAPEUTIC EXERCISES: CPT

## 2022-07-18 PROCEDURE — 97140 MANUAL THERAPY 1/> REGIONS: CPT

## 2022-07-18 NOTE — PROGRESS NOTES
Daily Note     Today's date: 2022  Patient name: Mari Tripp  : 1975  MRN: [id number]  Referring provider: Feliciano Gonzalez DO  Dx:   Encounter Diagnosis     ICD-10-CM    1  Chronic right shoulder pain  M25 511     G89 29    2  Acute pain of right shoulder  M25 511                   Subjective: Pt was very sore for about a day or two following LV  No overall change      Objective: See treatment diary below      Assessment: Pt continues to have pain with many of the stretches provided  He showed slight improvement in overall tolerance for TE's but was very sore after LV so exercises were not progressed  IR stretch with strap was very painful LV so modified to SL sleeper stretch which was more tolerable  Tolerated treatment well  Patient demonstrated fatigue post treatment and would benefit from continued PT      Plan: Continue per plan of care  Precautions: potential R partial rotator cuff tear; R shoulder strain  Other factors: h/o CVA  Pt goals: reduced pain in order to stay working  EPOC: 22      HEP:  Access Code: [email or web address]  URL: [email or web address]  Date: 2022  Prepared by: Bonny Egan    Exercises  · Standing Shoulder and Trunk Flexion at Table - 15 reps - 5 sec hold  · Seated Cervical Sidebending Stretch - 15 reps - 5 sec hold  · Standing Shoulder Posterior Capsule Stretch - 15 reps - 5 sec hold  · Standing Shoulder Abduction Full Range - 20 reps  · Standing Shoulder Flexion Full Range - 20 reps  · Standing Shoulder Internal Rotation AROM Behind Back - 20 reps      Manuals           cervical STM THERESA CARDENAS WE          UT active release THERESA ROA          GHJ inf, AP mob THERESA THERESA           R sh' PROM  [address] WE                                    Neuro Re-Ed                                                                                                        Ther Ex             UBE  3'/3' 3'/3'          Doorway pec x15 5"x15 P! held          SL IR sleeper stretch   5"x20          Sh' flex EOT x15 5"x15 5"x15          UT stretch x10 ea 5"x15 ea 5"x15 ea          Cross body stretch x10 ea 5"x15 ea 5"x15 ea          bwd sh' roll x15            Supine lat pullover  Legs at 90/90 5# x20 Legs at 90/90 5# x20          Sh' flex arom  x20 x20          Sh' abd arom  x20 x20          Sh' horz abd arom   x20          Sh' rot btb arom  x20 x20                                                                                        Ther Activity                                       Gait Training                                       Modalities             P  5'

## 2022-07-26 ENCOUNTER — OFFICE VISIT (OUTPATIENT)
Dept: PHYSICAL THERAPY | Facility: CLINIC | Age: 47
End: 2022-07-26
Payer: OTHER MISCELLANEOUS

## 2022-07-26 DIAGNOSIS — S46.101A INJURY OF TENDON OF LONG HEAD OF RIGHT BICEPS, INITIAL ENCOUNTER: ICD-10-CM

## 2022-07-26 DIAGNOSIS — M25.511 CHRONIC RIGHT SHOULDER PAIN: Primary | ICD-10-CM

## 2022-07-26 DIAGNOSIS — S46.011A TRAUMATIC INCOMPLETE TEAR OF RIGHT ROTATOR CUFF, INITIAL ENCOUNTER: ICD-10-CM

## 2022-07-26 DIAGNOSIS — G89.29 CHRONIC RIGHT SHOULDER PAIN: Primary | ICD-10-CM

## 2022-07-26 PROCEDURE — 97140 MANUAL THERAPY 1/> REGIONS: CPT | Performed by: PHYSICAL THERAPIST

## 2022-07-26 PROCEDURE — 97110 THERAPEUTIC EXERCISES: CPT | Performed by: PHYSICAL THERAPIST

## 2022-07-26 NOTE — PROGRESS NOTES
Daily Note     Today's date: 2022  Patient name: Bing Zurita  : 1975  MRN: [id number]  Referring provider: Jacob Zhang DO  Dx:   Encounter Diagnosis     ICD-10-CM    1  Chronic right shoulder pain  M25 511     G89 29    2  Traumatic incomplete tear of right rotator cuff, initial encounter  S46 011A    3  Injury of tendon of long head of right biceps, initial encounter  S46 101A        Start Time: 1448          Subjective: Having less of that pinched nerve feeling in the shoulder so overall feeling better than at IE  Still having a lot of pain with doorway pec stretching  Objective: See treatment diary below      Assessment: Told pt to hold on doorway pec  Instead of independent stretching, performed extensive soft tissue rolling of the shoulder and shoulder girdle musculature paired with S/L sh' ext PROM and supine IR PROM  Visible improvements in PROM tolerance and range noted t/o session  Ended with IR behind the back band pull downs for functional IR training  Pt did not mention noticeable change in sx end of session  Tolerated treatment well  Patient demonstrated fatigue post treatment and would benefit from continued PT      Plan: Continue per plan of care  Precautions: potential R partial rotator cuff tear; R shoulder strain  Other factors: h/o CVA  Pt goals: reduced pain in order to stay working  EPOC: 22      HEP:  Access Code: [email or web address]  URL: [email or web address]  Date: 2022  Prepared by: Lilia River    Exercises  · Standing Shoulder and Trunk Flexion at Table - 15 reps - 5 sec hold  · Seated Cervical Sidebending Stretch - 15 reps - 5 sec hold  · Standing Shoulder Posterior Capsule Stretch - 15 reps - 5 sec hold  · Standing Shoulder Abduction Full Range - 20 reps  · Standing Shoulder Flexion Full Range - 20 reps  · Standing Shoulder Internal Rotation AROM Behind Back - 20 reps      Manuals          cervical STM [address] [facility] [place] active release THERESA THERESA WE          GHJ inf, AP mob [address] THERESA  THERESA         R sh' PROM  THERESA WE THERESA         Pec, lat, post/ant sh' roller    THERESA                      Neuro Re-Ed                                                                                                        Ther Ex             UBE  3'/3' 3'/3' 3'/3'         Doorway pec x15 5"x15 P! held          SL IR sleeper stretch   5"x20          Sh' flex EOT x15 5"x15 5"x15          UT stretch x10 ea 5"x15 ea 5"x15 ea          Cross body stretch x10 ea 5"x15 ea 5"x15 ea          bwd sh' roll x15            Supine lat pullover  Legs at 90/90 5# x20 Legs at 90/90 5# x20          Sh' flex arom  x20 x20 x20         Sh' abd arom  x20 x20 x20         Sh' horz abd arom   x20          Sh' rot btb arom  x20 x20          tband IR behind the back pull down    rtb x20                                                                          Ther Activity                                       Gait Training                                       Modalities             [facility]  5'

## 2022-08-02 ENCOUNTER — APPOINTMENT (OUTPATIENT)
Dept: PHYSICAL THERAPY | Facility: CLINIC | Age: 47
End: 2022-08-02
Payer: OTHER MISCELLANEOUS

## 2022-08-02 DIAGNOSIS — E78.00 HYPERCHOLESTEROLEMIA: ICD-10-CM

## 2022-08-02 DIAGNOSIS — I10 ESSENTIAL HYPERTENSION: ICD-10-CM

## 2022-08-02 RX ORDER — ATORVASTATIN CALCIUM 40 MG/1
TABLET, FILM COATED ORAL
Qty: 30 TABLET | Refills: 0 | Status: SHIPPED | OUTPATIENT
Start: 2022-08-02 | End: 2022-09-07

## 2022-08-02 RX ORDER — LISINOPRIL 20 MG/1
TABLET ORAL
Qty: 30 TABLET | Refills: 0 | Status: SHIPPED | OUTPATIENT
Start: 2022-08-02 | End: 2022-09-05

## 2022-08-09 ENCOUNTER — EVALUATION (OUTPATIENT)
Dept: PHYSICAL THERAPY | Facility: CLINIC | Age: 47
End: 2022-08-09
Payer: OTHER MISCELLANEOUS

## 2022-08-09 DIAGNOSIS — G89.29 CHRONIC RIGHT SHOULDER PAIN: Primary | ICD-10-CM

## 2022-08-09 DIAGNOSIS — S46.101A INJURY OF TENDON OF LONG HEAD OF RIGHT BICEPS, INITIAL ENCOUNTER: ICD-10-CM

## 2022-08-09 DIAGNOSIS — M25.511 ACUTE PAIN OF RIGHT SHOULDER: ICD-10-CM

## 2022-08-09 DIAGNOSIS — M25.511 CHRONIC RIGHT SHOULDER PAIN: Primary | ICD-10-CM

## 2022-08-09 DIAGNOSIS — S46.011A TRAUMATIC INCOMPLETE TEAR OF RIGHT ROTATOR CUFF, INITIAL ENCOUNTER: ICD-10-CM

## 2022-08-09 PROCEDURE — 97110 THERAPEUTIC EXERCISES: CPT | Performed by: PHYSICAL THERAPIST

## 2022-08-09 PROCEDURE — 97140 MANUAL THERAPY 1/> REGIONS: CPT | Performed by: PHYSICAL THERAPIST

## 2022-08-09 NOTE — PROGRESS NOTES
PT Re-Evaluation  and PT Discharge    Today's date: 2022  Patient name: Chen Salcido  : 1975  MRN: [id number]  Referring provider: Isaiah Edwards DO  Dx:   Encounter Diagnosis     ICD-10-CM    1  Chronic right shoulder pain  M25 511     G89 29    2  Injury of tendon of long head of right biceps, initial encounter  S46 101A    3  Traumatic incomplete tear of right rotator cuff, initial encounter  S46 011A    4  Acute pain of right shoulder  M25 511                   Assessment  Assessment details: Chen Salcido is a 48 y o  male presenting to outpatient physical therapy at [place] [address] with complaints of R shoulder pain   They present with decreased range of motion, decreased strength, limited flexibility, poor postural awareness, poor body mechanics, poor balance, decreased tolerance to activity and decreased functional mobility due to Chronic right shoulder pain  (primary encounter diagnosis)  [facility] to address noted impairments in order to allow for full functional return to work and ADL-related activities  Thank you for the referral!    RE: 22  Chen Salcido has attended physical therapy for 5 visits  In this time, they have made significant improvements in symptoms and function, as noted by subjective report, improved balance, ROM, strength, flexibility and activity tolerance  They have made positive goal progress with FOTO score improvement from 64 at initial evaluation to 79 currently  Recommend d/c to HEP at this time  Impairments: abnormal muscle firing, abnormal or restricted ROM, activity intolerance, impaired balance, impaired physical strength, lacks appropriate home exercise program, pain with function and poor body mechanics  Barriers to therapy: n/a  Understanding of Dx/Px/POC: excellent  Goals  ST   Independent with HEP in 2 weeks - met  2   Decrease pain by 50% in 3 wks - met  3   Increase R shoulder flexion ROM to 140 degrees in 3 weeks - met    LT  Achieve FOTO score of 74/100 in 6 weeks - not met  2   Able to lift 25# OH in 6 weeks - met  3  Strength = 5/5 R shoulder all planes in 6 weeks - met      Plan  Plan details: D/c to HEP  Patient would benefit from: skilled physical therapy  Planned modality interventions: cryotherapy, electrical stimulation/Russian stimulation and thermotherapy: hydrocollator packs  Planned therapy interventions: abdominal trunk stabilization, manual therapy, neuromuscular re-education, therapeutic activities, therapeutic exercise, body mechanics training and home exercise program  Treatment plan discussed with: patient        Subjective Evaluation    History of Present Illness  Mechanism of injury: Pt c/o R shoulder and arm pain  3 months ago Pt reports he was getting off a truck at work and his feet got caught up together, causing him to fall  Pt landed on his hands  Notes a jarring feeling to the shoulders, but denies feeling a pop at the time of injury  Subsequent R shoulder pain  Denies head trauma  The shoulder was getting better, though 1 week ago he started having severe shooting pains that originate in the shoulder joint and radiate down the back of the arm  Rated 7/10  Denies radiation past the elbow, neck pain, RUE N/T  Positive for weakness, but pt reports this is secondary to babying the shoulder for 3 months  Agg with reaching overhead, handstands, frisbee  Pain is eased with 500mg aleve (taken daily) and rest      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[employer], which involves frequent lifting and rotating  He is currently working  Main concern with the shoulder is that something is damaged and could affect his ability to work  Main goal for therapy is to reduce pain  RE: 22  Pt reports overall 80% improvement since starting therapy as noted by decreased shooting pain that would radiate down the arm  He feels stronger and more mobile   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  He has not been doing handstands, but is able to play frisbee and other sports  He is ready for d/c from therapy and will continue the HEP at this time        Patient Goals  Patient goals for therapy: decreased edema, decreased pain, improved balance, increased motion, return to work, return to [employer] activities, independence with ADLs/IADLs and increased strength          Objective     Postural Observations    Additional Postural Observation Details  Rounded shoulders bilat    Tenderness     Additional Tenderness Details  TTP with increased tone R UT, LS, pec    (-) painful arc  (-) full, empty can  (+) rodriguez  (-) biceps load  (-) belly press    Cervical/Thoracic Screen   Cervical range of motion within normal limits with the following exceptions: R PROM lower cervical sidebending < L  CTJ restricted  Thoracic range of motion within normal limits    Neurological Testing     Sensation     Shoulder   Left Shoulder   Intact: light touch    Right Shoulder   Intact: light touch    Active Range of Motion   Left Shoulder   Flexion: 145 degrees WFL  Extension: 45 degrees WFL  Abduction: 160 degrees WFL  External rotation 0°: 68 degrees WFL  External rotation BTH: T3 WFL  Internal rotation BTB: T9 WFL    Right Shoulder   Flexion: 150 degrees WFL  Extension: 46 degrees WFL  Abduction: 165 degrees WFL  External rotation 0°: 70 degrees WFL  External rotation BTH: T3 WFL and with pain  Internal rotation BTB: T11 with pain    Strength/Myotome Testing     Left Shoulder   Normal muscle strength    Right Shoulder     Planes of Motion   Flexion: 5   Extension: 5   Abduction: 5   Adduction: 5   External rotation at 0°: 5   Internal rotation at 0°: 5     Isolated Muscles   Biceps: 5   Lower trapezius: 4+   Middle trapezius: 4+   Serratus anterior: 5   Upper trapezius: 5              Precautions: potential R partial rotator cuff tear; R shoulder strain  Other factors: h/o CVA  Pt goals: reduced pain in order to stay working  EPOC: 8/12/22      HEP:  Access Code: [email or web address]  URL: [email or web address]  Date: 07/12/2022  Prepared by: Bonny Egan    Exercises  · Standing Shoulder and Trunk Flexion at Table - 15 reps - 5 sec hold  · Seated Cervical Sidebending Stretch - 15 reps - 5 sec hold  · Standing Shoulder Posterior Capsule Stretch - 15 reps - 5 sec hold  · Standing Shoulder Abduction Full Range - 20 reps  · Standing Shoulder Flexion Full Range - 20 reps  · Standing Shoulder Internal Rotation AROM Behind Back - 20 reps      Manuals 6/30 7/12 7/18 7/26 8/9        cervical STM THERESA THERESA WE THERESA THERESA        UT active release THERESA THERESA WE          GHJ inf, AP mob [address] [facility] THERESA  THERESA THERESA        R sh' PROM  [address] [facility] WE [address] [facility] THERESA        Pec, lat, post/ant sh' roller    [address]        RE     [address]                     Neuro Re-Ed                                                                                                        Ther Ex             UBE  3'/3' 3'/3' 3'/3' 3'/3'        Doorway pec x15 5"x15 P! held          SL IR sleeper stretch   5"x20          Sh' flex EOT x15 5"x15 5"x15          UT stretch x10 ea 5"x15 ea 5"x15 ea          Cross body stretch x10 ea 5"x15 ea 5"x15 ea          bwd sh' roll x15            Supine lat pullover  Legs at 90/90 5# x20 Legs at 90/90 5# x20          Sh' flex arom  x20 x20 x20         Sh' abd arom  x20 x20 x20         Sh' horz abd arom   x20          Sh' rot btb arom  x20 x20          tband IR behind the back pull down    rtb x20         Supine flyes     x20                                                            Ther Activity                                       Gait Training                                       Modalities             [facility]  5'

## 2022-09-05 DIAGNOSIS — I10 ESSENTIAL HYPERTENSION: ICD-10-CM

## 2022-09-05 RX ORDER — LISINOPRIL 20 MG/1
TABLET ORAL
Qty: 30 TABLET | Refills: 0 | Status: SHIPPED | OUTPATIENT
Start: 2022-09-05 | End: 2022-10-19 | Stop reason: SDUPTHER

## 2022-09-27 ENCOUNTER — TELEPHONE (OUTPATIENT)
Dept: CARDIOLOGY CLINIC | Facility: CLINIC | Age: 47
End: 2022-09-27

## 2022-09-28 ENCOUNTER — TELEPHONE (OUTPATIENT)
Dept: CARDIOLOGY CLINIC | Facility: CLINIC | Age: 47
End: 2022-09-28

## 2022-09-28 ENCOUNTER — OFFICE VISIT (OUTPATIENT)
Dept: CARDIOLOGY CLINIC | Facility: CLINIC | Age: 47
End: 2022-09-28
Payer: COMMERCIAL

## 2022-09-28 VITALS
HEART RATE: 75 BPM | DIASTOLIC BLOOD PRESSURE: 80 MMHG | HEIGHT: 68 IN | SYSTOLIC BLOOD PRESSURE: 112 MMHG | WEIGHT: 178 LBS | BODY MASS INDEX: 26.98 KG/M2

## 2022-09-28 DIAGNOSIS — E78.00 HYPERCHOLESTEREMIA: ICD-10-CM

## 2022-09-28 DIAGNOSIS — I10 PRIMARY HYPERTENSION: ICD-10-CM

## 2022-09-28 DIAGNOSIS — I25.118 CORONARY ARTERY DISEASE WITH STABLE ANGINA PECTORIS, UNSPECIFIED VESSEL OR LESION TYPE, UNSPECIFIED WHETHER NATIVE OR TRANSPLANTED HEART (HCC): ICD-10-CM

## 2022-09-28 DIAGNOSIS — I25.10 CORONARY ARTERY DISEASE INVOLVING NATIVE CORONARY ARTERY OF NATIVE HEART WITHOUT ANGINA PECTORIS: Primary | ICD-10-CM

## 2022-09-28 DIAGNOSIS — I25.2 OLD MYOCARDIAL INFARCTION: ICD-10-CM

## 2022-09-28 PROCEDURE — 93000 ELECTROCARDIOGRAM COMPLETE: CPT | Performed by: INTERNAL MEDICINE

## 2022-09-28 PROCEDURE — 99214 OFFICE O/P EST MOD 30 MIN: CPT | Performed by: INTERNAL MEDICINE

## 2022-09-28 RX ORDER — NITROGLYCERIN 0.4 MG/1
0.4 TABLET SUBLINGUAL
Qty: 25 TABLET | Refills: 3 | Status: SHIPPED | OUTPATIENT
Start: 2022-09-28 | End: 2022-09-29 | Stop reason: SDUPTHER

## 2022-09-28 NOTE — TELEPHONE ENCOUNTER
Patient asked while checking out of today's appointment for a 2nd Nitro to be put in as Dr Maribel Angela did in the past   So a total of 2 called in        Thank you

## 2022-09-28 NOTE — PROGRESS NOTES
Cardiology Follow Up    Mari Aw  1975  [id number]  [address] [facility] [address] [phone]    1  Coronary artery disease involving native coronary artery of native heart without angina pectoris     2  Primary hypertension     3  Old myocardial infarction     4  Hypercholesteremia         Interval History:  Cardiology follow-up  Patient continues to do well from a cardiac point of view denies any chest pain or dyspnea  He is active mostly at work, no regular exercise  Sleep hygiene is poor because of his work schedule  His father at age 67 recently passed away from myocardial infarction  No significant bleeding issues on chronic aspirin therapy  Patient states been compliant with low-cholesterol diet, lipids last year total cholesterol 129, HDL 42 LDL 74  He is on high-intensity statin therapy  Compliant low-sodium diet, blood pressures been well control      Patient Active Problem List   Diagnosis    Anxiety    Coronary artery disease    Glaucoma    Pain of right heel    HTN (hypertension)    Hypercholesteremia    Impaired fasting glucose    Lump of skin    Sciatica    Vitamin D deficiency    Old myocardial infarction    LUQ pain    Colon polyps    Grade III hemorrhoids    Hypertensive heart disease with heart failure (HCC)     Past Medical History:   Diagnosis Date    Anterolateral myocardial infarction [facility])     last assessed 10/30/17    Chest pain     Coronary artery disease     Glaucoma     Heart disease     Hypercholesterolemia     Hypertension     Myocardial infarction ([address] )     Uveitis     Visual impairment      Social History     Socioeconomic History    Marital status: /Civil Union     Spouse name: Not on file    Number of children: Not on file    Years of education: Not on file    Highest education level: Not on file   Occupational History  Occupation: employed   Tobacco Use    Smoking status: Former Smoker    Smokeless tobacco: Never Used   Vaping Use    Vaping Use: Never used   Substance and Sexual Activity    Alcohol use: No    Drug use: Yes     Types: Marijuana    Sexual activity: Not Currently   Other Topics Concern    Not on file   Social History Narrative    Live with wife  Supportive and Safe at home  Sees dentist occasionally        No mental or sub in self      Always uses seatbelt    Exercise: walking    No advance directive    No living will    Occasional caffeine consumption     Social Determinants of Health     Financial Resource Strain: Not on file   Food Insecurity: Not on file   Transportation Needs: Not on file   Physical Activity: Not on file   Stress: Not on file   Social Connections: Not on file   Intimate Partner Violence: Not on file   Housing Stability: Not on file      Family History   Problem Relation Age of Onset    Blindness Mother         retinitis pigmentosis    Heart attack Father         1st heart attack at age 28- had CABG in past    Diabetes Father     Blindness Maternal Aunt         retinitis pigmentosis    Dislocations Family     Heart disease Family     Hypertension Family     Mental illness Neg Hx     Substance Abuse Neg Hx     Colon cancer Neg Hx     Colon polyps Neg Hx      Past Surgical History:   Procedure Laterality Date    APPENDECTOMY      CARDIAC CATHETERIZATION      CARDIAC SURGERY      CORONARY STENT PLACEMENT      EYE SURGERY      TONSILLECTOMY         Current Outpatient Medications:     aspirin (ECOTRIN LOW STRENGTH) 81 mg EC tablet, Take 81 mg by mouth daily, Disp: , Rfl:     atorvastatin (LIPITOR) 40 mg tablet, Take 1 tablet by mouth once daily, Disp: 30 tablet, Rfl: 11    Cholecalciferol (VITAMIN D3) 2000 units TABS, Take 1 tablet by mouth daily, Disp: , Rfl:     [employer] F[employer]ER GUMMY [employer] CHILDREN PO, Take 2 each by mouth daily, Disp: , Rfl:     dorzolamide-timolol (COSOPT) 22 3-6 8 MG/ML ophthalmic solution, INSTILL 1 DROP INTO LEFT EYE TWICE DAILY, Disp: , Rfl:     fluorometholone (FML) 0 1 % ophthalmic suspension, Administer 1 drop into the left eye daily, Disp: , Rfl:     fluticasone (FLONASE) 50 mcg/act nasal spray, 1 spray into each nostril daily, Disp: 1 Bottle, Rfl: 3    lisinopril (ZESTRIL) 20 mg tablet, Take 1 tablet by mouth once daily, Disp: 30 tablet, Rfl: 0    metoprolol succinate (TOPROL-XL) 50 mg 24 hr tablet, Take 1 tablet (50 mg total) by mouth daily, Disp: 90 tablet, Rfl: 3    naproxen (Naprosyn) 500 mg tablet, Take 1 tablet (500 mg total) by mouth 2 (two) times a day with meals For 7d, then up to twice a day as needed for pain, Disp: 30 tablet, Rfl: 1    nitroglycerin (Nitrostat) 0 4 mg SL tablet, Place 1 tablet (0 4 mg total) under the tongue every 5 (five) minutes as needed for chest pain, Disp: 25 tablet, Rfl: 3    pantoprazole (PROTONIX) 40 mg tablet, Take 1 tablet (40 mg total) by mouth daily, Disp: 90 tablet, Rfl: 3  Allergies   Allergen Reactions    Penicillins Anaphylaxis and Other (See Comments)    Azithromycin      Annotation - [id number]: Listed in old chart   Latex Rash       Labs:  No visits with results within 6 Month(s) from this visit     Latest known visit with results is:   Orders Only on 01/21/2022   Component Date Value    White Blood Cell Count 01/21/2022 5 8     Red Blood Cell Count 01/21/2022 5 43     Hemoglobin 01/21/2022 15 7     HCT 01/21/2022 47 7     MCV 01/21/2022 88     MCH 01/21/2022 28 9     MCHC 01/21/2022 32 9     RDW 01/21/2022 12 2     Platelet Count 12/78/8827 301     Neutrophils 01/21/2022 50     Lymphocytes 01/21/2022 39     Monocytes 01/21/2022 9     Eosinophils 01/21/2022 1     Basophils PCT 01/21/2022 1     Neutrophils (Absolute) 01/21/2022 2 9     Lymphocytes (Absolute) 01/21/2022 2 3     Monocytes (Absolute) 01/21/2022 0 5     Eosinophils (Absolute) 01/21/2022 0 1     Basophils ABS 01/21/2022 0 0     Immature Granulocytes 01/21/2022 0     Immature Granulocytes (A* 01/21/2022 0 0     Glucose, Random 01/21/2022 103 (A)    BUN 01/21/2022 16     Creatinine 01/21/2022 0 97     eGFR Non  01/21/2022 92     eGFR  01/21/2022 106     SL AMB BUN/CREATININE RA* 01/21/2022 16     Sodium 01/21/2022 139     Potassium 01/21/2022 4 9     Chloride 01/21/2022 101     CO2 01/21/2022 24     CALCIUM 01/21/2022 9 5     Protein, Total 01/21/2022 6 5     Albumin 01/21/2022 4 6     Globulin, Total 01/21/2022 1 9     Albumin/Globulin Ratio 01/21/2022 2 4 (A)    TOTAL BILIRUBIN 01/21/2022 0 6     Alk Phos Isoenzymes 01/21/2022 67     AST 01/21/2022 26     ALT 01/21/2022 26     Specific Gravity 01/21/2022 1 019     Ph 01/21/2022 6 0     Color UA 01/21/2022 Yellow     Urine Appearance 01/21/2022 Clear     Leukocyte Esterase 01/21/2022 Negative     Protein 01/21/2022 Negative     Glucose, 24 HR Urine 01/21/2022 Negative     Ketone, Urine 01/21/2022 Negative     Blood, Urine 01/21/2022 Negative     Bilirubin, Urine 01/21/2022 Negative     Urobilinogen Urine 01/21/2022 0 2     SL AMB NITRITES URINE, Q* 01/21/2022 Negative     Microscopic Examination 01/21/2022 Comment     Microscopic Examination 01/21/2022 See below:     SL AMB WBC, URINE 01/21/2022 None seen     RBC, Urine 01/21/2022 None seen     Epithelial Cells (non re* 01/21/2022 None seen     Casts 01/21/2022 None seen     Bacteria, Urine 01/21/2022 None seen     Sedimentation Rate 01/21/2022 2     C-Reactive Protein, Quant 01/21/2022 <1      Imaging: No results found  Review of Systems:  Review of Systems   Constitutional: Negative for activity change and fatigue  HENT: Negative for nosebleeds  Eyes: Negative for visual disturbance  Respiratory: Negative for apnea, shortness of breath, wheezing and stridor  Cardiovascular: Negative for chest pain, palpitations and leg swelling  Gastrointestinal: Negative for abdominal pain, anal bleeding and blood in stool  Endocrine: Negative for cold intolerance  Genitourinary: Negative for hematuria  Musculoskeletal: Negative for gait problem and myalgias  Skin: Negative for pallor and rash  Allergic/Immunologic: Negative for immunocompromised state  Neurological: Negative for dizziness, syncope and weakness  Hematological: Does not bruise/bleed easily  Psychiatric/Behavioral: Negative for sleep disturbance  The patient is not nervous/anxious  Physical Exam:  Physical Exam  Vitals reviewed  Constitutional:       General: He is not in acute distress  Appearance: Normal appearance  He is normal weight  He is not ill-appearing, toxic-appearing or diaphoretic  Eyes:      General: No scleral icterus  Neck:      Vascular: No carotid bruit  Cardiovascular:      Rate and Rhythm: Normal rate and regular rhythm  Pulses: Normal pulses  Heart sounds: Normal heart sounds  No murmur heard  No friction rub  No gallop  Pulmonary:      Effort: Pulmonary effort is normal  No respiratory distress  Breath sounds: Normal breath sounds  No stridor  No wheezing, rhonchi or rales  Musculoskeletal:      Right lower leg: No edema  Left lower leg: No edema  Skin:     General: Skin is warm and dry  Capillary Refill: Capillary refill takes less than 2 seconds  Coloration: Skin is not jaundiced or pale  Findings: No bruising  Neurological:      Mental Status: He is alert and oriented to person, place, and time  Psychiatric:         Mood and Affect: Mood normal          Discussion/Summary:  Coronary artery disease, premature, status post anterior myocardial infarction  Two thousand fifteen age 37  PTCA/drug stent of the LAD  Residual 60% marginal 50% RCA lesions  Stress test 2020 he did 11 minutes on a Maxi protocol there was no EKG criteria for ischemia or symptoms    Echocardiogram 2021 revealed normal left systolic function with normal diastolic parameters and mild mitral insufficiency  Continue current medications, will check lipid profile  This note was completed in part utilizing [employer] direct voice recognition software  Grammatical errors, random word insertion, spelling mistakes, and incomplete sentences may be an occasional consequence of the system secondary to software limitations, ambient noise and hardware issues  At the time of dictation, efforts were made to edit, clarify and /or correct errors  Please read the chart carefully and recognize, using context, where substitutions have occurred  If you have any questions or concerns about the context, text or information contained within the body of this dictation, please contact myself, the provider, for further clarification

## 2022-09-29 DIAGNOSIS — I25.118 CORONARY ARTERY DISEASE WITH STABLE ANGINA PECTORIS, UNSPECIFIED VESSEL OR LESION TYPE, UNSPECIFIED WHETHER NATIVE OR TRANSPLANTED HEART (HCC): ICD-10-CM

## 2022-09-29 RX ORDER — NITROGLYCERIN 0.4 MG/1
0.4 TABLET SUBLINGUAL
Qty: 25 TABLET | Refills: 3 | Status: SHIPPED | OUTPATIENT
Start: 2022-09-29

## 2022-10-19 DIAGNOSIS — I10 ESSENTIAL HYPERTENSION: ICD-10-CM

## 2022-10-19 RX ORDER — LISINOPRIL 20 MG/1
20 TABLET ORAL DAILY
Qty: 30 TABLET | Refills: 5 | Status: SHIPPED | OUTPATIENT
Start: 2022-10-19

## 2022-10-31 ENCOUNTER — OFFICE VISIT (OUTPATIENT)
Dept: FAMILY MEDICINE CLINIC | Facility: HOSPITAL | Age: 47
End: 2022-10-31

## 2022-10-31 VITALS
DIASTOLIC BLOOD PRESSURE: 80 MMHG | TEMPERATURE: 97.1 F | BODY MASS INDEX: 27.71 KG/M2 | HEIGHT: 68 IN | HEART RATE: 72 BPM | WEIGHT: 182.8 LBS | OXYGEN SATURATION: 96 % | SYSTOLIC BLOOD PRESSURE: 122 MMHG

## 2022-10-31 DIAGNOSIS — H92.01 ACUTE PAIN OF RIGHT EAR: Primary | ICD-10-CM

## 2022-10-31 NOTE — PROGRESS NOTES
Name: Laquita Garcia      : 1975      MRN: [id number]  Encounter Provider: VICTOR MANUEL Manzanares  Encounter Date: 10/31/2022   Encounter department: [facility] [address]  Acute pain of right ear  Comments:  consult entered to f/u with ENT for further eval (r/o cholesteatoma)  Orders:  -     Ambulatory Referral to Otolaryngology; Future           Subjective        Woke with ear pain last night and continues w/discomfort today  No meds taken for  Has been healthy otherwise without recent illness  Wears ear plugs regularly  Last OM was last year w/o symptoms since  Denies hx of TM tubes or other ear surgeries  Review of Systems   Constitutional: Negative for chills and fever  HENT: Positive for ear pain (right ear)  Negative for congestion, rhinorrhea and sinus pressure          Current Outpatient Medications on File Prior to Visit   Medication Sig   • aspirin (ECOTRIN LOW STRENGTH) 81 mg EC tablet Take 81 mg by mouth daily   • atorvastatin (LIPITOR) 40 mg tablet Take 1 tablet by mouth once daily   • Cholecalciferol (VITAMIN D3) 2000 units TABS Take 1 tablet by mouth daily   • [employer] FIBER GUMMY BEARS CHILDREN PO Take 2 each by mouth daily   • dorzolamide-timolol (COSOPT) 22 3-6 8 MG/ML ophthalmic solution INSTILL 1 DROP INTO LEFT EYE TWICE DAILY   • fluorometholone (FML) 0 1 % ophthalmic suspension Administer 1 drop into the left eye daily   • lisinopril (ZESTRIL) 20 mg tablet Take 1 tablet (20 mg total) by mouth daily   • metoprolol succinate (TOPROL-XL) 50 mg 24 hr tablet Take 1 tablet (50 mg total) by mouth daily   • pantoprazole (PROTONIX) 40 mg tablet Take 1 tablet (40 mg total) by mouth daily   • fluticasone (FLONASE) 50 mcg/act nasal spray 1 spray into each nostril daily   • naproxen (Naprosyn) 500 mg tablet Take 1 tablet (500 mg total) by mouth 2 (two) times a day with meals For 7d, then up to twice a day as needed for pain (Patient not taking: Reported on 9/28/2022)   • nitroglycerin (Nitrostat) 0 4 mg SL tablet Place 1 tablet (0 4 mg total) under the tongue every 5 (five) minutes as needed for chest pain       Objective     /80 (Patient Position: Sitting, Cuff Size: Standard)   Pulse 72   Temp (!) 97 1 °F (36 2 °C) (Tympanic)   Ht 5' 8" (1 727 m)   Wt 82 9 kg (182 lb 12 8 oz)   SpO2 96%   BMI 27 79 kg/m²       Physical Exam  Vitals reviewed  Constitutional:       General: He is not in acute distress  Appearance: Normal appearance  HENT:      Head: Normocephalic  Right Ear: Ear canal normal       Left Ear: Tympanic membrane and ear canal normal       Ears:     Pulmonary:      Effort: Pulmonary effort is normal  No respiratory distress  Musculoskeletal:      Cervical back: Normal range of motion  Lymphadenopathy:      Cervical: No cervical adenopathy  Skin:     General: Skin is warm and dry  Neurological:      General: No focal deficit present  Mental Status: He is alert and oriented to person, place, and time     Psychiatric:         Mood and Affect: Mood normal          Behavior: Behavior normal           VICTOR MANUEL Hernandez

## 2022-11-22 DIAGNOSIS — R10.12 LUQ PAIN: ICD-10-CM

## 2022-11-22 RX ORDER — PANTOPRAZOLE SODIUM 40 MG/1
40 TABLET, DELAYED RELEASE ORAL DAILY
Qty: 90 TABLET | Refills: 3 | Status: SHIPPED | OUTPATIENT
Start: 2022-11-22

## 2023-01-26 ENCOUNTER — TELEPHONE (OUTPATIENT)
Dept: FAMILY MEDICINE CLINIC | Facility: HOSPITAL | Age: 48
End: 2023-01-26

## 2023-03-24 ENCOUNTER — OFFICE VISIT (OUTPATIENT)
Dept: FAMILY MEDICINE CLINIC | Facility: HOSPITAL | Age: 48
End: 2023-03-24

## 2023-03-24 VITALS
BODY MASS INDEX: 26.83 KG/M2 | DIASTOLIC BLOOD PRESSURE: 82 MMHG | HEART RATE: 68 BPM | OXYGEN SATURATION: 98 % | RESPIRATION RATE: 16 BRPM | HEIGHT: 68 IN | WEIGHT: 177 LBS | SYSTOLIC BLOOD PRESSURE: 126 MMHG | TEMPERATURE: 98.5 F

## 2023-03-24 DIAGNOSIS — M62.838 MUSCLE SPASM: ICD-10-CM

## 2023-03-24 DIAGNOSIS — H69.82 DYSFUNCTION OF LEFT EUSTACHIAN TUBE: Primary | ICD-10-CM

## 2023-03-24 RX ORDER — FLUTICASONE PROPIONATE 50 MCG
1 SPRAY, SUSPENSION (ML) NASAL DAILY
Qty: 16 G | Refills: 5 | Status: SHIPPED | OUTPATIENT
Start: 2023-03-24

## 2023-03-24 RX ORDER — CYCLOBENZAPRINE HCL 10 MG
10 TABLET ORAL 3 TIMES DAILY PRN
Qty: 30 TABLET | Refills: 0 | Status: SHIPPED | OUTPATIENT
Start: 2023-03-24

## 2023-03-24 RX ORDER — CYCLOBENZAPRINE HCL 10 MG
10 TABLET ORAL 3 TIMES DAILY PRN
COMMUNITY
End: 2023-03-24 | Stop reason: SDUPTHER

## 2023-03-24 NOTE — PROGRESS NOTES
Assessment/Plan:    No problem-specific Assessment & Plan notes found for this encounter  Diagnoses and all orders for this visit:    Dysfunction of left eustachian tube  -     fluticasone (FLONASE) 50 mcg/act nasal spray; 1 spray into each nostril daily    Muscle spasm  -     cyclobenzaprine (FLEXERIL) 10 mg tablet; Take 1 tablet (10 mg total) by mouth 3 (three) times a day as needed for muscle spasms    Other orders  -     Discontinue: cyclobenzaprine (FLEXERIL) 10 mg tablet; Take 10 mg by mouth 3 (three) times a day as needed for muscle spasms        I suspect patient has left eustachian tube dysfunction as his getting over an upper respiratory viral infection most likely  I will treat him with Flonase nasal spray  There was no evidence of acute otitis media  Subjective:      Patient ID: Smith Quigley is a 50 y o  male  Patient presents for evaluation of the left ear  He has muffled hearing some popping in the left ear  He is getting over an upper respiratory infection that has been present for 2 weeks and is improving  He had nasal congestion compos no dripping  Denies fever, shortness of breath  He is concerned whether he has a left-sided middle ear infection  He also asked me to give him a refill on his Flexeril that he uses as needed for muscle spasms and muscle aches  Patient presents for follow-up of chronic conditions as detailed in the assessment and plan  The following portions of the patient's history were reviewed and updated as appropriate: current medications, past family history, past medical history, past social history, past surgical history and problem list     Review of Systems      Objective:    /82   Pulse 68   Temp 98 5 °F (36 9 °C) (Tympanic)   Resp 16   Ht 5' 8" (1 727 m)   Wt 80 3 kg (177 lb)   SpO2 98%   BMI 26 91 kg/m²      Physical Exam  Constitutional:       General: He is not in acute distress       Appearance: He is not toxic-appearing  HENT:      Right Ear: Tympanic membrane and ear canal normal  There is no impacted cerumen  Left Ear: Ear canal normal  There is no impacted cerumen  Ears:      Comments: Patient had retraction of the left tympanic membrane but there was no evidence of middle ear infection, light reflex of the tympanic membrane was normal   There was no fluid behind the left TM     Nose: Rhinorrhea (scant clear discharge is present in both nostrils) present  Mouth/Throat:      Pharynx: No oropharyngeal exudate or posterior oropharyngeal erythema  Neurological:      Mental Status: He is alert               Kristy Morales MD

## 2023-05-18 DIAGNOSIS — R73.01 IMPAIRED FASTING GLUCOSE: Primary | ICD-10-CM

## 2023-05-18 DIAGNOSIS — I10 PRIMARY HYPERTENSION: ICD-10-CM

## 2023-05-18 DIAGNOSIS — E78.00 HYPERCHOLESTEREMIA: ICD-10-CM

## 2023-05-18 DIAGNOSIS — E55.9 VITAMIN D DEFICIENCY: ICD-10-CM

## 2023-06-01 DIAGNOSIS — I10 ESSENTIAL HYPERTENSION: ICD-10-CM

## 2023-06-02 RX ORDER — METOPROLOL SUCCINATE 50 MG/1
50 TABLET, EXTENDED RELEASE ORAL DAILY
Qty: 90 TABLET | Refills: 3 | Status: SHIPPED | OUTPATIENT
Start: 2023-06-02

## 2023-06-29 DIAGNOSIS — I10 ESSENTIAL HYPERTENSION: ICD-10-CM

## 2023-06-29 RX ORDER — LISINOPRIL 20 MG/1
20 TABLET ORAL DAILY
Qty: 30 TABLET | Refills: 5 | Status: SHIPPED | OUTPATIENT
Start: 2023-06-29

## 2023-09-18 DIAGNOSIS — E78.00 HYPERCHOLESTEROLEMIA: ICD-10-CM

## 2023-09-18 RX ORDER — ATORVASTATIN CALCIUM 40 MG/1
TABLET, FILM COATED ORAL
Qty: 30 TABLET | Refills: 3 | Status: SHIPPED | OUTPATIENT
Start: 2023-09-18

## 2023-12-19 DIAGNOSIS — R10.12 LUQ PAIN: ICD-10-CM

## 2023-12-19 RX ORDER — PANTOPRAZOLE SODIUM 40 MG/1
40 TABLET, DELAYED RELEASE ORAL DAILY
Qty: 30 TABLET | Refills: 0 | Status: SHIPPED | OUTPATIENT
Start: 2023-12-19

## 2023-12-21 ENCOUNTER — OFFICE VISIT (OUTPATIENT)
Dept: FAMILY MEDICINE CLINIC | Facility: HOSPITAL | Age: 48
End: 2023-12-21
Payer: COMMERCIAL

## 2023-12-21 VITALS
WEIGHT: 180 LBS | DIASTOLIC BLOOD PRESSURE: 70 MMHG | BODY MASS INDEX: 27.37 KG/M2 | RESPIRATION RATE: 16 BRPM | HEART RATE: 75 BPM | SYSTOLIC BLOOD PRESSURE: 118 MMHG | TEMPERATURE: 98.3 F

## 2023-12-21 DIAGNOSIS — I11.0 HYPERTENSIVE HEART DISEASE WITH HEART FAILURE (HCC): ICD-10-CM

## 2023-12-21 DIAGNOSIS — H92.02 ACUTE EAR PAIN, LEFT: Primary | ICD-10-CM

## 2023-12-21 DIAGNOSIS — I10 PRIMARY HYPERTENSION: ICD-10-CM

## 2023-12-21 PROCEDURE — 99214 OFFICE O/P EST MOD 30 MIN: CPT | Performed by: NURSE PRACTITIONER

## 2023-12-21 NOTE — PROGRESS NOTES
[facility]   Belinda RUSH    Assessment/Plan:      Diagnosis ICD-10-CM Associated Orders   1. Acute ear pain, left  H92.02       2. Primary hypertension  I10       3. Hypertensive heart disease with heart failure (HCC)  I11.0         Reprinted labwork   Symptom management, if fever/chills, change in appetite or worsening symptoms please return for further assessment  Return in about 4 weeks (around 1/18/2024) for Annual physical, F/U after bloodwork.  Patient may call or return to office with any questions or concerns.     ______________________________________________________________________  Subjective:     Patient ID: Eloy Thomas is a 48 y.o. male.  Eloy Thomas  Chief Complaint   Patient presents with    Earache     Left ear/neck pain x 4 days.      Intermittent left ear pain since Monday night.  Hx recurrent ear infections and works around loud machines.  Denies fever/chills, feels otherwise well.  Not vaccinated for influenza/pneumonia/COVID nor interested.  Requesting labwork be reprinted, ordered in May 2023.            BMI Counseling: Body mass index is 27.37 kg/m². The BMI is above normal. Nutrition recommendations include encouraging healthy choices of fruits and vegetables, moderation in carbohydrate intake, increasing intake of lean protein and reducing intake of cholesterol. Exercise recommendations include exercising 3-5 times per week and strength training exercises. Rationale for BMI follow-up plan is due to patient being overweight or obese.         The following portions of the patient's history were reviewed and updated as appropriate: allergies, current medications, past family history, past medical history, past social history, past surgical history, and problem list.    Review of Systems   Constitutional: Negative.  Negative for activity change, appetite change, chills, fatigue and fever.   HENT:  Positive for ear pain. Negative for postnasal drip, sinus  pressure, sinus pain, sore throat and trouble swallowing.    Eyes: Negative.    Respiratory: Negative.  Negative for cough and shortness of breath.    Cardiovascular: Negative.  Negative for chest pain and leg swelling.   Gastrointestinal: Negative.  Negative for constipation and diarrhea.   Endocrine: Negative.    Genitourinary: Negative.  Negative for dysuria.   Musculoskeletal: Negative.    Skin: Negative.    Allergic/Immunologic: Negative.  Negative for immunocompromised state.   Neurological: Negative.  Negative for dizziness and light-headedness.   Hematological: Negative.    Psychiatric/Behavioral:  Positive for sleep disturbance.          Objective:      Vitals:    12/21/23 0910   BP: 118/70   Pulse: 75   Resp: 16   Temp: 98.3 °F (36.8 °C)      Physical Exam  Vitals and nursing note reviewed.   Constitutional:       Appearance: Normal appearance.   HENT:      Head: Normocephalic and atraumatic.      Right Ear: Tympanic membrane and external ear normal. No swelling or tenderness. No mastoid tenderness. Tympanic membrane is not erythematous or bulging.      Left Ear: Tympanic membrane and external ear normal. No swelling or tenderness. No mastoid tenderness. Tympanic membrane is not erythematous or bulging.      Ears:      Comments: Canals with scant erythema bilaterally.  Admits to Qtip use.  Negative tragus pull.  TM WNL bilaterally.     Nose: Nose normal. No congestion or rhinorrhea.      Mouth/Throat:      Mouth: Mucous membranes are moist.      Pharynx: Oropharynx is clear. No posterior oropharyngeal erythema.   Eyes:      Extraocular Movements: Extraocular movements intact.      Conjunctiva/sclera: Conjunctivae normal.      Pupils: Pupils are equal, round, and reactive to light.   Cardiovascular:      Rate and Rhythm: Normal rate and regular rhythm.      Pulses: Normal pulses.      Heart sounds: Normal heart sounds.   Pulmonary:      Effort: Pulmonary effort is normal.      Breath sounds: Normal breath  "sounds.   Abdominal:      General: Bowel sounds are normal.      Palpations: Abdomen is soft.   Musculoskeletal:         General: Normal range of motion.      Cervical back: Normal range of motion and neck supple. No tenderness.   Lymphadenopathy:      Cervical: No cervical adenopathy.   Skin:     General: Skin is warm and dry.   Neurological:      General: No focal deficit present.      Mental Status: He is alert and oriented to person, place, and time.   Psychiatric:         Mood and Affect: Mood normal.         Behavior: Behavior normal.         Thought Content: Thought content normal.         Judgment: Judgment normal.           Portions of the record may have been created with voice recognition software. Occasional wrong word or \"sound alike\" substitutions may have occurred due to the inherent limitations of voice recognition software. Please review the chart carefully and recognize, using context, where substitutions/typographical errors may have occurred.     "

## 2024-01-04 LAB
CHOLEST SERPL-MCNC: 146 MG/DL (ref 100–199)
HDLC SERPL-MCNC: 42 MG/DL
LDLC SERPL CALC-MCNC: 79 MG/DL (ref 0–99)
LDLC/HDLC SERPL: 1.9 RATIO (ref 0–3.6)
SL AMB VLDL CHOLESTEROL CALC: 25 MG/DL (ref 5–40)
TRIGL SERPL-MCNC: 143 MG/DL (ref 0–149)

## 2024-01-29 DIAGNOSIS — I10 ESSENTIAL HYPERTENSION: ICD-10-CM

## 2024-01-29 DIAGNOSIS — E78.00 HYPERCHOLESTEROLEMIA: ICD-10-CM

## 2024-01-29 DIAGNOSIS — R10.12 LUQ PAIN: ICD-10-CM

## 2024-01-29 RX ORDER — ATORVASTATIN CALCIUM 40 MG/1
40 TABLET, FILM COATED ORAL DAILY
Qty: 30 TABLET | Refills: 5 | Status: SHIPPED | OUTPATIENT
Start: 2024-01-29

## 2024-01-29 RX ORDER — PANTOPRAZOLE SODIUM 40 MG/1
40 TABLET, DELAYED RELEASE ORAL DAILY
Qty: 30 TABLET | Refills: 5 | Status: SHIPPED | OUTPATIENT
Start: 2024-01-29

## 2024-01-29 RX ORDER — LISINOPRIL 20 MG/1
20 TABLET ORAL DAILY
Qty: 30 TABLET | Refills: 5 | Status: SHIPPED | OUTPATIENT
Start: 2024-01-29

## 2024-05-09 ENCOUNTER — TELEPHONE (OUTPATIENT)
Age: 49
End: 2024-05-09

## 2024-05-09 NOTE — TELEPHONE ENCOUNTER
Patient is requesting an insurance referral for the following specialty:      Test Name / Order Name:     DX Code: H40.9   2024 ICD-10-CM Diagnosis Code H40. 9: Unspecified glaucoma.    Date Of Service: 5/9 7:30am    Location/Facility Name/Address/Phone #: [facility]  Address: [address] [place] [address] Phone: [phone]    Location / Facility NPI: Provider Information for [id number]    [facility] Phone # To Reach The Patient:     [phone] (

## 2024-05-15 ENCOUNTER — NURSE TRIAGE (OUTPATIENT)
Age: 49
End: 2024-05-15

## 2024-05-15 ENCOUNTER — TELEPHONE (OUTPATIENT)
Dept: FAMILY MEDICINE CLINIC | Facility: HOSPITAL | Age: 49
End: 2024-05-15

## 2024-05-15 NOTE — TELEPHONE ENCOUNTER
"Returned call to patient. Not having chest pain. He moved a certain way yesterday and felt a pain and now he has what he says looks like a bulging line or bubble on the right side of his chest. Feels muscular but would like to be seen. No appts available advised to go to  if no appointments can be found. Called office and spoke with them who state they will talk to Dr. Nunes and then call patient. Patient requested voicemail be left as he is working and will check. Please advise.       Reason for Disposition   Patient says chest pain feels exactly the same as previously diagnosed 'heartburn' and describes burning in chest and accompanying sour taste in mouth    Answer Assessment - Initial Assessment Questions  1. LOCATION: \"Where does it hurt?\"        Right side.   2. RADIATION: \"Does the pain go anywhere else?\" (e.g., into neck, jaw, arms, back)      denies  3. ONSET: \"When did the chest pain begin?\" (Minutes, hours or days)       Yesterday   4. PATTERN \"Does the pain come and go, or has it been constant since it started?\"  \"Does it get worse with exertion?\"       Intermittent with movement   5. DURATION: \"How long does it last\" (e.g., seconds, minutes, hours)      minutes  6. SEVERITY: \"How bad is the pain?\"  (e.g., Scale 1-10; mild, moderate, or severe)     - MILD (1-3): doesn't interfere with normal activities      - MODERATE (4-7): interferes with normal activities or awakens from sleep     - SEVERE (8-10): excruciating pain, unable to do any normal activities        Mild to moderate  7. CARDIAC RISK FACTORS: \"Do you have any history of heart problems or risk factors for heart disease?\" (e.g., angina, prior heart attack; diabetes, high blood pressure, high cholesterol, smoker, or strong family history of heart disease)      denies  8. PULMONARY RISK FACTORS: \"Do you have any history of lung disease?\"  (e.g., blood clots in lung, asthma, emphysema, birth control pills)      denies  9. CAUSE: \"What do you think " "is causing the chest pain?\"      Unsure  10. OTHER SYMPTOMS: \"Do you have any other symptoms?\" (e.g., dizziness, nausea, vomiting, sweating, fever, difficulty breathing, cough)        Bulging area  11. PREGNANCY: \"Is there any chance you are pregnant?\" \"When was your last menstrual period?\"        N.a    Protocols used: Chest Pain-ADULT-[place]    "

## 2024-05-15 NOTE — TELEPHONE ENCOUNTER
Called patient back as PCP has not responded yet. Advised patitent to go to urgent care. Patient verbalzied understanding.

## 2024-05-15 NOTE — TELEPHONE ENCOUNTER
Eloy called through the Access [facility] and spoke to a Nurse - she called us - he called because he moved a certain way yesterday now he feels he has a bubble inside the right side of the chest  - she was asking if we could squeeze him in anywhere or should he go to urgent care or ER he has no chest pain

## 2024-05-15 NOTE — TELEPHONE ENCOUNTER
Regarding: Chest Pain  ----- Message from Henri LOMAX sent at 5/15/2024  9:24 AM EDT -----  Chest Pain, bulging line appeared and it hurts. Schedule appointment for 5/20/24. Please call

## 2024-05-16 DIAGNOSIS — R10.9 ABDOMINAL DISCOMFORT: Primary | ICD-10-CM

## 2024-05-16 NOTE — TELEPHONE ENCOUNTER
Patient calling back. He went to [place] urgent care and they did not do EKG or Chest xray. Advised him to contact PCP to get ultrasound order. They did not feel it was heart related. Please advise. Patient states he will drop off paperwork from urgent care visit at next appt he does not have a way to send in. If he does not answer leave message as he is working and will listen when he has a chance.

## 2024-05-16 NOTE — TELEPHONE ENCOUNTER
Left detailed message on pt ans machine. Pt has ov w/Elda 05/20, if he wishes to change appt per Dr. Nunes - I left a message to call tiki . ANNIE

## 2024-05-18 ENCOUNTER — HOSPITAL ENCOUNTER (EMERGENCY)
Facility: HOSPITAL | Age: 49
Discharge: HOME | End: 2024-05-18
Attending: EMERGENCY MEDICINE | Admitting: EMERGENCY MEDICINE
Payer: COMMERCIAL

## 2024-05-18 ENCOUNTER — APPOINTMENT (OUTPATIENT)
Dept: RADIOLOGY | Facility: HOSPITAL | Age: 49
End: 2024-05-18
Payer: COMMERCIAL

## 2024-05-18 VITALS
DIASTOLIC BLOOD PRESSURE: 93 MMHG | SYSTOLIC BLOOD PRESSURE: 129 MMHG | HEART RATE: 61 BPM | OXYGEN SATURATION: 97 % | TEMPERATURE: 97.5 F | RESPIRATION RATE: 15 BRPM

## 2024-05-18 DIAGNOSIS — R07.9 CHEST PAIN, UNSPECIFIED: Primary | ICD-10-CM

## 2024-05-18 LAB
2HR DELTA HS TROPONIN: 0 NG/L
ALBUMIN SERPL BCP-MCNC: 4.4 G/DL (ref 3.5–5)
ALP SERPL-CCNC: 56 U/L (ref 34–104)
ALT SERPL W P-5'-P-CCNC: 22 U/L (ref 7–52)
ANION GAP SERPL CALCULATED.3IONS-SCNC: 3 MMOL/L (ref 4–13)
AST SERPL W P-5'-P-CCNC: 20 U/L (ref 13–39)
BASOPHILS # BLD AUTO: 0.02 THOUSANDS/ÂΜL (ref 0–0.1)
BASOPHILS NFR BLD AUTO: 0 % (ref 0–1)
BILIRUB SERPL-MCNC: 0.69 MG/DL (ref 0.2–1)
BUN SERPL-MCNC: 13 MG/DL (ref 5–25)
CALCIUM SERPL-MCNC: 9.1 MG/DL (ref 8.4–10.2)
CARDIAC TROPONIN I PNL SERPL HS: 3 NG/L
CARDIAC TROPONIN I PNL SERPL HS: 3 NG/L
CHLORIDE SERPL-SCNC: 107 MMOL/L (ref 96–108)
CO2 SERPL-SCNC: 28 MMOL/L (ref 21–32)
CREAT SERPL-MCNC: 1.03 MG/DL (ref 0.6–1.3)
D DIMER PPP FEU-MCNC: 0.27 UG/ML FEU
EOSINOPHIL # BLD AUTO: 0.05 THOUSAND/ÂΜL (ref 0–0.61)
EOSINOPHIL NFR BLD AUTO: 1 % (ref 0–6)
ERYTHROCYTE [DISTWIDTH] IN BLOOD BY AUTOMATED COUNT: 11.5 % (ref 11.6–15.1)
GFR SERPL CREATININE-BSD FRML MDRD: 84 ML/MIN/1.73SQ M
GLUCOSE SERPL-MCNC: 107 MG/DL (ref 65–140)
HCT VFR BLD AUTO: 46 % (ref 36.5–49.3)
HGB BLD-MCNC: 15.1 G/DL (ref 12–17)
IMM GRANULOCYTES # BLD AUTO: 0.02 THOUSAND/UL (ref 0–0.2)
IMM GRANULOCYTES NFR BLD AUTO: 0 % (ref 0–2)
LYMPHOCYTES # BLD AUTO: 2.06 THOUSANDS/ÂΜL (ref 0.6–4.47)
LYMPHOCYTES NFR BLD AUTO: 40 % (ref 14–44)
MCH RBC QN AUTO: 29 PG (ref 26.8–34.3)
MCHC RBC AUTO-ENTMCNC: 32.8 G/DL (ref 31.4–37.4)
MCV RBC AUTO: 88 FL (ref 82–98)
MONOCYTES # BLD AUTO: 0.44 THOUSAND/ÂΜL (ref 0.17–1.22)
MONOCYTES NFR BLD AUTO: 9 % (ref 4–12)
NEUTROPHILS # BLD AUTO: 2.61 THOUSANDS/ÂΜL (ref 1.85–7.62)
NEUTS SEG NFR BLD AUTO: 50 % (ref 43–75)
NRBC BLD AUTO-RTO: 0 /100 WBCS
PLATELET # BLD AUTO: 238 THOUSANDS/UL (ref 149–390)
PMV BLD AUTO: 9.5 FL (ref 8.9–12.7)
POTASSIUM SERPL-SCNC: 4.8 MMOL/L (ref 3.5–5.3)
PROT SERPL-MCNC: 6.6 G/DL (ref 6.4–8.4)
RBC # BLD AUTO: 5.21 MILLION/UL (ref 3.88–5.62)
SODIUM SERPL-SCNC: 138 MMOL/L (ref 135–147)
WBC # BLD AUTO: 5.2 THOUSAND/UL (ref 4.31–10.16)

## 2024-05-18 PROCEDURE — 36415 COLL VENOUS BLD VENIPUNCTURE: CPT

## 2024-05-18 PROCEDURE — 99285 EMERGENCY DEPT VISIT HI MDM: CPT

## 2024-05-18 PROCEDURE — 99285 EMERGENCY DEPT VISIT HI MDM: CPT | Performed by: PHYSICIAN ASSISTANT

## 2024-05-18 PROCEDURE — 80053 COMPREHEN METABOLIC PANEL: CPT | Performed by: EMERGENCY MEDICINE

## 2024-05-18 PROCEDURE — 84484 ASSAY OF TROPONIN QUANT: CPT | Performed by: EMERGENCY MEDICINE

## 2024-05-18 PROCEDURE — 71046 X-RAY EXAM CHEST 2 VIEWS: CPT

## 2024-05-18 PROCEDURE — 85025 COMPLETE CBC W/AUTO DIFF WBC: CPT | Performed by: EMERGENCY MEDICINE

## 2024-05-18 PROCEDURE — 85379 FIBRIN DEGRADATION QUANT: CPT | Performed by: PHYSICIAN ASSISTANT

## 2024-05-18 PROCEDURE — 93005 ELECTROCARDIOGRAM TRACING: CPT

## 2024-05-18 NOTE — DISCHARGE INSTRUCTIONS
Rest, tylenol/motrin for discomfort.  Follow up with PCP for recheck in 2-3 days.  Return to ER if symptoms worsen.

## 2024-05-18 NOTE — ED PROVIDER NOTES
History  Chief Complaint   Patient presents with    Chest Pain     Pt reports chest pain that started around 8am. Reports just waking up when this started. Sharp chest pain that radiates to the back. Hx of heart attack . Denies any sob. Reports that the pain comes and goes. Denies worsening with movement.      Patient is a 49 y/o M with h/o HTN, hyperlipidemia that presents to the ED with right sided chest pain that he woke up this morning with around 8:30AM.  He states the pain is constant and radiates to his back.  He denies SOB.  No leg pain or swelling.  No recent surgeries or history of blood clots.  Nothing makes the pain worse or better.  He denies trauma to chest, but states he was cleaning up leaves yesterday.        History provided by:  Patient  Chest Pain  Associated symptoms: no abdominal pain, no cough, no dizziness, no fever, no nausea, no numbness, no palpitations, no shortness of breath, not vomiting and no weakness        Prior to Admission Medications   Prescriptions Last Dose Informant Patient Reported? Taking?   [employer] PO  Self Yes Yes   Sig: Take 2 each by mouth daily   Cholecalciferol (VITAMIN D3) 2000 units TABS  Self Yes Yes   Sig: Take 1 tablet by mouth daily   aspirin (ECOTRIN LOW STRENGTH) 81 mg EC tablet  Self Yes Yes   Sig: Take 81 mg by mouth daily   atorvastatin (LIPITOR) 40 mg tablet   No Yes   Sig: Take 1 tablet (40 mg total) by mouth daily   cyclobenzaprine (FLEXERIL) 10 mg tablet   No Yes   Sig: Take 1 tablet (10 mg total) by mouth 3 (three) times a day as needed for muscle spasms   dorzolamide-timolol (COSOPT) 22.3-6.8 MG/ML ophthalmic solution  Self Yes Yes   Sig: INSTILL 1 DROP INTO LEFT EYE TWICE DAILY   fluorometholone (FML) 0.1 % ophthalmic suspension  Self Yes Yes   Sig: Administer 1 drop into the left eye daily   fluticasone (FLONASE) 50 mcg/act nasal spray   No Yes   Si spray into each nostril daily   lisinopril (ZESTRIL) 20 mg tablet   No  Yes   Sig: Take 1 tablet (20 mg total) by mouth daily   metoprolol succinate (TOPROL-XL) 50 mg 24 hr tablet   No Yes   Sig: Take 1 tablet (50 mg total) by mouth daily   nitroglycerin (Nitrostat) 0.4 mg SL tablet   No Yes   Sig: Place 1 tablet (0.4 mg total) under the tongue every 5 (five) minutes as needed for chest pain   pantoprazole (PROTONIX) 40 mg tablet   No Yes   Sig: Take 1 tablet (40 mg total) by mouth daily      Facility-Administered Medications: None       Past Medical History:   Diagnosis Date    Anterolateral myocardial infarction (HCC)     last assessed 10/30/17    Chest pain     Coronary artery disease     Glaucoma     Heart disease     Hypercholesterolemia     Hypertension     Myocardial infarction (HCC)     Uveitis     Visual impairment        Past Surgical History:   Procedure Laterality Date    APPENDECTOMY      CARDIAC CATHETERIZATION      CARDIAC SURGERY      CORONARY STENT PLACEMENT      EYE SURGERY      TONSILLECTOMY         Family History   Problem Relation Age of Onset    Blindness Mother         retinitis pigmentosis    Heart attack Father         1st heart attack at age 35- had CABG in past    Diabetes Father     Blindness Maternal Aunt         retinitis pigmentosis    Dislocations Family     Heart disease Family     Hypertension Family     Mental illness Neg Hx     Substance Abuse Neg Hx     Colon cancer Neg Hx     Colon polyps Neg Hx      I have reviewed and agree with the history as documented.    E-Cigarette/Vaping    E-Cigarette Use Never User      E-Cigarette/Vaping Substances    Nicotine No     THC No     CBD No     Flavoring No     Other No     Unknown No      Social History     Tobacco Use    Smoking status: Former    Smokeless tobacco: Never   Vaping Use    Vaping status: Never Used   Substance Use Topics    Alcohol use: No    Drug use: Yes     Types: Marijuana       Review of Systems   Constitutional:  Negative for chills and fever.   HENT: Negative.     Respiratory:  Negative  for cough and shortness of breath.    Cardiovascular:  Positive for chest pain. Negative for palpitations and leg swelling.   Gastrointestinal:  Negative for abdominal pain, diarrhea, nausea and vomiting.   Genitourinary:  Negative for dysuria.   Skin:  Negative for color change and rash.   Neurological:  Negative for dizziness, weakness and numbness.   Psychiatric/Behavioral:  Negative for confusion.    All other systems reviewed and are negative.      Physical Exam  Physical Exam  Vitals and nursing note reviewed.   Constitutional:       General: He is not in acute distress.     Appearance: Normal appearance. He is well-developed and well-groomed. He is not ill-appearing or diaphoretic.   HENT:      Head: Normocephalic and atraumatic.      Right Ear: Hearing normal.      Left Ear: Hearing normal.      Nose: Nose normal.      Mouth/Throat:      Mouth: Mucous membranes are moist.   Eyes:      Conjunctiva/sclera: Conjunctivae normal.   Cardiovascular:      Rate and Rhythm: Normal rate and regular rhythm.      Heart sounds: Normal heart sounds.   Pulmonary:      Effort: Pulmonary effort is normal.      Breath sounds: Normal breath sounds. No wheezing, rhonchi or rales.   Chest:      Chest wall: No deformity, swelling or tenderness.   Abdominal:      General: Abdomen is flat. Bowel sounds are normal.      Palpations: Abdomen is soft.      Tenderness: There is no abdominal tenderness.   Musculoskeletal:         General: No tenderness. Normal range of motion.      Cervical back: Normal range of motion.      Right lower leg: No edema.      Left lower leg: No edema.   Skin:     General: Skin is warm and dry.      Coloration: Skin is not jaundiced or pale.      Findings: No rash.   Neurological:      General: No focal deficit present.      Mental Status: He is alert and oriented to person, place, and time.      Motor: No weakness.   Psychiatric:         Mood and Affect: Mood normal.         Behavior: Behavior is  cooperative.         Vital Signs  ED Triage Vitals [05/18/24 0949]   Temperature Pulse Respirations Blood Pressure SpO2   97.5 °F (36.4 °C) 71 20 (!) 151/109 99 %      Temp Source Heart Rate Source Patient Position - Orthostatic VS BP Location FiO2 (%)   Temporal Monitor Sitting Left arm --      Pain Score       7           Vitals:    05/18/24 0949 05/18/24 1030 05/18/24 1130 05/18/24 1200   BP: (!) 151/109 125/86 124/84 129/93   Pulse: 71 63 59 61   Patient Position - Orthostatic VS: Sitting Lying Lying Lying         Visual Acuity      ED Medications  Medications - No data to display    Diagnostic Studies  Results Reviewed       Procedure Component Value Units Date/Time    HS Troponin I 2hr [[id number]]  (Normal) Collected: 05/18/24 1159    Lab Status: Final result Specimen: Blood from Arm, Right Updated: 05/18/24 1227     hs TnI 2hr 3 ng/L      Delta 2hr hsTnI 0 ng/L     D-Dimer [[id number]]  (Normal) Collected: 05/18/24 1038    Lab Status: Final result Specimen: Blood from Arm, Right Updated: 05/18/24 1119     D-Dimer, Quant 0.27 ug/ml FEU     Narrative:      In the evaluation for possible pulmonary embolism, in the appropriate (Well's Score of 4 or less) patient, the age adjusted d-dimer cutoff for this patient can be calculated as:    Age x 0.01 (in ug/mL) for Age-adjusted D-dimer exclusion threshold for a patient over 50 years.    HS Troponin 0hr (reflex protocol) [[id number]]  (Normal) Collected: 05/18/24 0958    Lab Status: Final result Specimen: Blood from Arm, Right Updated: 05/18/24 1032     hs TnI 0hr 3 ng/L     Comprehensive metabolic panel [[id number]]  (Abnormal) Collected: 05/18/24 0958    Lab Status: Final result Specimen: Blood from Arm, Right Updated: 05/18/24 1027     Sodium 138 mmol/L      Potassium 4.8 mmol/L      Chloride 107 mmol/L      CO2 28 mmol/L      ANION GAP 3 mmol/L      BUN 13 mg/dL      Creatinine 1.03 mg/dL      Glucose 107 mg/dL      Calcium 9.1 mg/dL      AST 20 U/L      ALT 22 U/L       Alkaline Phosphatase 56 U/L      Total Protein 6.6 g/dL      Albumin 4.4 g/dL      Total Bilirubin 0.69 mg/dL      eGFR 84 ml/min/1.73sq m     Narrative:      National Kid[employer] Disease Foundation guidelines for Chronic Kidney Disease (CKD):     Stage 1 with normal or high GFR (GFR > 90 mL/min/1.73 square meters)    Stage 2 Mild CKD (GFR = 60-89 mL/min/1.73 square meters)    Stage 3A Moderate CKD (GFR = 45-59 mL/min/1.73 square meters)    Stage 3B Moderate CKD (GFR = 30-44 mL/min/1.73 square meters)    Stage 4 Severe CKD (GFR = 15-29 mL/min/1.73 square meters)    Stage 5 End Stage CKD (GFR <15 mL/min/1.73 square meters)  Note: GFR calculation is accurate only with a steady state creatinine    CBC and differential [[id number]]  (Abnormal) Collected: 05/18/24 0958    Lab Status: Final result Specimen: Blood from Arm, Right Updated: 05/18/24 1009     WBC 5.20 Thousand/uL      RBC 5.21 Million/uL      Hemoglobin 15.1 g/dL      Hematocrit 46.0 %      MCV 88 fL      MCH 29.0 pg      MCHC 32.8 g/dL      RDW 11.5 %      MPV 9.5 fL      Platelets 238 Thousands/uL      nRBC 0 /100 WBCs      Segmented % 50 %      Immature Grans % 0 %      Lymphocytes % 40 %      Monocytes % 9 %      Eosinophils Relative 1 %      Basophils Relative 0 %      Absolute Neutrophils 2.61 Thousands/µL      Absolute Immature Grans 0.02 Thousand/uL      Absolute Lymphocytes 2.06 Thousands/µL      Absolute Monocytes 0.44 Thousand/µL      Eosinophils Absolute 0.05 Thousand/µL      Basophils Absolute 0.02 Thousands/µL                    XR chest pa & lateral   ED Interpretation by Zohreh Jorge PA-C (05/18 1024)   No acute abnormalities.                  Procedures  ECG 12 Lead Documentation Only    Date/Time: 5/18/2024 10:10 AM    Performed by: Zohreh Jorge PA-C  Authorized by: Zohreh Jorge PA-C    Indications / Diagnosis:  Chest pain  ECG reviewed by me, the ED Provider: yes    Patient location:  ED  Previous ECG:      Previous ECG:  Unavailable  Rate:     ECG rate:  67  Rhythm:     Rhythm: sinus rhythm    Conduction:     Conduction: abnormal      Abnormal conduction: incomplete RBBB    ST segments:     ST segments:  Normal           ED Course             HEART Risk Score      Flowsheet Row Most Recent Value   Heart Score Risk Calculator    History 0 Filed at: 05/18/2024 1227   ECG 0 Filed at: 05/18/2024 1227   Age 1 Filed at: 05/18/2024 1227   Risk Factors 2 Filed at: 05/18/2024 1227   Troponin 0 Filed at: 05/18/2024 1227   HEART Score 3 Filed at: 05/18/2024 1227                          SBIRT 20yo+      Flowsheet Row Most Recent Value   Initial Alcohol Screen: US AUDIT-C     1. How often do you have a drink containing alcohol? 0 Filed at: 05/18/2024 1022   2. How many drinks containing alcohol do you have on a typical day you are drinking?  0 Filed at: 05/18/2024 1022   3a. Male UNDER 65: How often do you have five or more drinks on one occasion? 0 Filed at: 05/18/2024 1022   Audit-C Score 0 Filed at: 05/18/2024 1022   NICHOL: How many times in the past year have you...    Used an illegal drug or used a prescription medication for non-medical reasons? Never Filed at: 05/18/2024 1022            Wells' Criteria for PE      Flowsheet Row Most Recent Value   Wells' Criteria for PE    Clinical signs and symptoms of DVT 0 Filed at: 05/18/2024 1242   PE is primary diagnosis or equally likely 0 Filed at: 05/18/2024 1242   HR >100 0 Filed at: 05/18/2024 1242   Immobilization at least 3 days or Surgery in the previous 4 weeks 0 Filed at: 05/18/2024 1242   Previous, objectively diagnosed PE or DVT 0 Filed at: 05/18/2024 1242   Hemoptysis 0 Filed at: 05/18/2024 1242   Malignancy with treatment within 6 months or palliative 0 Filed at: 05/18/2024 1242   Wells' Criteria Total 0 Filed at: 05/18/2024 1242                  Medical Decision Making  Patient with right sided chest pain, will order labs, CXR, EKG to r/o ACS, pneumonia,  pneumothorax.  Patient with normal Ddimer, wells score 0, no concern for PE.  Most likely MSK, advised f/u with PCP for recheck.  REturn precautions given.     Amount and/or Complexity of Data Reviewed  Labs: ordered.  Radiology: independent interpretation performed.  ECG/medicine tests: ordered and independent interpretation performed.             Disposition  Final diagnoses:   Chest pain, unspecified     Time reflects when diagnosis was documented in both MDM as applicable and the Disposition within this note       Time User Action Codes Description Comment    5/18/2024 12:28 PM Zohreh Jorge [R07.9] Chest pain, unspecified           ED Disposition       ED Disposition   Discharge    Condition   Stable    Date/Time   Sat May 18, 2024 1228    Comment   Eloy Thomas discharge to home/self care.                   Follow-up Information       Follow up With Specialties Details Why Contact Info Additional Information    Fannie Nunes,  Internal Medicine Schedule an appointment as soon as possible for a visit in 2 days For recheck [address] [place], [place] [id number] [phone]        [facility] Emergency Department Emergency Medicine Go to  If symptoms worsen [address] [facility] [address] [phone] [facility] Emergency Department, [address], [place] [address]            Discharge Medication List as of 5/18/2024 12:30 PM        CONTINUE these medications which have NOT CHANGED    Details   aspirin (ECOTRIN LOW STRENGTH) 81 mg EC tablet Take 81 mg by mouth daily, Historical Med      atorvastatin (LIPITOR) 40 mg tablet Take 1 tablet (40 mg total) by mouth daily, Starting Mon 1/29/2024, Normal      Cholecalciferol (VITAMIN D3) 2000 units TABS Take 1 tablet by mouth daily, Starting Thu 12/17/2015, Historical Med      [employer] PO Take 2 each by mouth daily, Historical Med       cyclobenzaprine (FLEXERIL) 10 mg tablet Take 1 tablet (10 mg total) by mouth 3 (three) times a day as needed for muscle spasms, Starting Fri 3/24/2023, Normal      dorzolamide-timolol (COSOPT) 22.3-6.8 MG/ML ophthalmic solution INSTILL 1 DROP INTO LEFT EYE TWICE DAILY, Historical Med      fluorometholone (FML) 0.1 % ophthalmic suspension Administer 1 drop into the left eye daily, Historical Med      fluticasone (FLONASE) 50 mcg/act nasal spray 1 spray into each nostril daily, Starting Fri 3/24/2023, Normal      lisinopril (ZESTRIL) 20 mg tablet Take 1 tablet (20 mg total) by mouth daily, Starting Mon 1/29/2024, Normal      metoprolol succinate (TOPROL-XL) 50 mg 24 hr tablet Take 1 tablet (50 mg total) by mouth daily, Starting Fri 6/2/2023, Normal      nitroglycerin (Nitrostat) 0.4 mg SL tablet Place 1 tablet (0.4 mg total) under the tongue every 5 (five) minutes as needed for chest pain, Starting Thu 9/29/2022, Normal      pantoprazole (PROTONIX) 40 mg tablet Take 1 tablet (40 mg total) by mouth daily, Starting Mon 1/29/2024, Normal             No discharge procedures on file.    PDMP Review       None            ED Provider  Electronically Signed by             Zohreh Jorge PA-C  05/18/24 8692

## 2024-05-19 LAB
ATRIAL RATE: 61 BPM
ATRIAL RATE: 67 BPM
ATRIAL RATE: 68 BPM
P AXIS: 47 DEGREES
P AXIS: 50 DEGREES
P AXIS: 64 DEGREES
PR INTERVAL: 142 MS
PR INTERVAL: 144 MS
PR INTERVAL: 146 MS
QRS AXIS: 102 DEGREES
QRS AXIS: 102 DEGREES
QRS AXIS: 106 DEGREES
QRSD INTERVAL: 92 MS
QRSD INTERVAL: 92 MS
QRSD INTERVAL: 96 MS
QT INTERVAL: 362 MS
QT INTERVAL: 362 MS
QT INTERVAL: 366 MS
QTC INTERVAL: 364 MS
QTC INTERVAL: 384 MS
QTC INTERVAL: 386 MS
T WAVE AXIS: 43 DEGREES
T WAVE AXIS: 44 DEGREES
T WAVE AXIS: 54 DEGREES
VENTRICULAR RATE: 61 BPM
VENTRICULAR RATE: 67 BPM
VENTRICULAR RATE: 68 BPM

## 2024-05-19 PROCEDURE — 93010 ELECTROCARDIOGRAM REPORT: CPT | Performed by: INTERNAL MEDICINE

## 2024-05-31 ENCOUNTER — HOSPITAL ENCOUNTER (OUTPATIENT)
Dept: ULTRASOUND IMAGING | Facility: HOSPITAL | Age: 49
Discharge: HOME | End: 2024-05-31
Attending: INTERNAL MEDICINE

## 2024-05-31 DIAGNOSIS — R10.9 ABDOMINAL DISCOMFORT: ICD-10-CM

## 2024-06-09 DIAGNOSIS — I10 ESSENTIAL HYPERTENSION: ICD-10-CM

## 2024-06-09 RX ORDER — METOPROLOL SUCCINATE 50 MG/1
50 TABLET, EXTENDED RELEASE ORAL DAILY
Qty: 30 TABLET | Refills: 5 | Status: SHIPPED | OUTPATIENT
Start: 2024-06-09

## 2024-06-16 ENCOUNTER — HOSPITAL ENCOUNTER (OUTPATIENT)
Dept: ULTRASOUND IMAGING | Facility: HOSPITAL | Age: 49
Discharge: HOME | End: 2024-06-16
Payer: COMMERCIAL

## 2024-06-16 DIAGNOSIS — R10.9 ABDOMINAL DISCOMFORT: ICD-10-CM

## 2024-06-16 PROCEDURE — 76705 ECHO EXAM OF ABDOMEN: CPT

## 2024-06-28 ENCOUNTER — TELEPHONE (OUTPATIENT)
Dept: FAMILY MEDICINE CLINIC | Facility: HOSPITAL | Age: 49
End: 2024-06-28

## 2024-06-28 NOTE — TELEPHONE ENCOUNTER
----- Message from Fannie Nunes DO sent at 6/27/2024  8:51 PM EDT -----  Please call the patient regarding his us of abdomen results- has mild fatty liver changes- no other concerns

## 2024-07-13 DIAGNOSIS — E78.00 HYPERCHOLESTEROLEMIA: ICD-10-CM

## 2024-07-13 DIAGNOSIS — R10.12 LUQ PAIN: ICD-10-CM

## 2024-07-13 DIAGNOSIS — I10 ESSENTIAL HYPERTENSION: ICD-10-CM

## 2024-07-13 RX ORDER — PANTOPRAZOLE SODIUM 40 MG/1
40 TABLET, DELAYED RELEASE ORAL DAILY
Qty: 30 TABLET | Refills: 0 | Status: SHIPPED | OUTPATIENT
Start: 2024-07-13

## 2024-07-13 RX ORDER — ATORVASTATIN CALCIUM 40 MG/1
40 TABLET, FILM COATED ORAL DAILY
Qty: 30 TABLET | Refills: 0 | Status: SHIPPED | OUTPATIENT
Start: 2024-07-13

## 2024-07-13 RX ORDER — LISINOPRIL 20 MG/1
20 TABLET ORAL DAILY
Qty: 30 TABLET | Refills: 0 | Status: SHIPPED | OUTPATIENT
Start: 2024-07-13

## 2024-08-28 DIAGNOSIS — I10 ESSENTIAL HYPERTENSION: ICD-10-CM

## 2024-08-28 DIAGNOSIS — E78.00 HYPERCHOLESTEROLEMIA: ICD-10-CM

## 2024-08-28 DIAGNOSIS — R10.12 LUQ PAIN: ICD-10-CM

## 2024-08-28 RX ORDER — LISINOPRIL 20 MG/1
20 TABLET ORAL DAILY
Qty: 30 TABLET | Refills: 5 | Status: SHIPPED | OUTPATIENT
Start: 2024-08-28

## 2024-08-28 RX ORDER — ATORVASTATIN CALCIUM 40 MG/1
40 TABLET, FILM COATED ORAL DAILY
Qty: 30 TABLET | Refills: 5 | Status: SHIPPED | OUTPATIENT
Start: 2024-08-28

## 2024-08-28 RX ORDER — PANTOPRAZOLE SODIUM 40 MG/1
40 TABLET, DELAYED RELEASE ORAL DAILY
Qty: 30 TABLET | Refills: 5 | Status: SHIPPED | OUTPATIENT
Start: 2024-08-28

## 2024-10-09 ENCOUNTER — OFFICE VISIT (OUTPATIENT)
Dept: URGENT CARE | Facility: CLINIC | Age: 49
End: 2024-10-09
Payer: COMMERCIAL

## 2024-10-09 ENCOUNTER — APPOINTMENT (EMERGENCY)
Dept: CT IMAGING | Facility: HOSPITAL | Age: 49
End: 2024-10-09
Payer: COMMERCIAL

## 2024-10-09 ENCOUNTER — HOSPITAL ENCOUNTER (EMERGENCY)
Facility: HOSPITAL | Age: 49
Discharge: HOME | End: 2024-10-09
Attending: EMERGENCY MEDICINE
Payer: COMMERCIAL

## 2024-10-09 VITALS
WEIGHT: 175 LBS | BODY MASS INDEX: 26.52 KG/M2 | RESPIRATION RATE: 17 BRPM | HEIGHT: 68 IN | OXYGEN SATURATION: 97 % | TEMPERATURE: 98.5 F | SYSTOLIC BLOOD PRESSURE: 115 MMHG | DIASTOLIC BLOOD PRESSURE: 86 MMHG | HEART RATE: 61 BPM

## 2024-10-09 VITALS
SYSTOLIC BLOOD PRESSURE: 136 MMHG | TEMPERATURE: 98.1 F | RESPIRATION RATE: 16 BRPM | HEART RATE: 68 BPM | DIASTOLIC BLOOD PRESSURE: 90 MMHG | OXYGEN SATURATION: 97 %

## 2024-10-09 DIAGNOSIS — R10.32 LEFT LOWER QUADRANT ABDOMINAL PAIN: Primary | ICD-10-CM

## 2024-10-09 DIAGNOSIS — R10.9 ABDOMINAL PAIN: Primary | ICD-10-CM

## 2024-10-09 DIAGNOSIS — K57.92 ACUTE DIVERTICULITIS: ICD-10-CM

## 2024-10-09 LAB
ALBUMIN SERPL BCG-MCNC: 4.5 G/DL (ref 3.5–5)
ALP SERPL-CCNC: 62 U/L (ref 34–104)
ALT SERPL W P-5'-P-CCNC: 29 U/L (ref 7–52)
ANION GAP SERPL CALCULATED.3IONS-SCNC: 6 MMOL/L (ref 4–13)
AST SERPL W P-5'-P-CCNC: 25 U/L (ref 13–39)
BASOPHILS # BLD AUTO: 0.01 THOUSANDS/ΜL (ref 0–0.1)
BASOPHILS NFR BLD AUTO: 0 % (ref 0–1)
BILIRUB SERPL-MCNC: 1.1 MG/DL (ref 0.2–1)
BILIRUB UR QL STRIP: NEGATIVE
BUN SERPL-MCNC: 10 MG/DL (ref 5–25)
CALCIUM SERPL-MCNC: 9.3 MG/DL (ref 8.4–10.2)
CHLORIDE SERPL-SCNC: 105 MMOL/L (ref 96–108)
CLARITY UR: CLEAR
CO2 SERPL-SCNC: 29 MMOL/L (ref 21–32)
COLOR UR: YELLOW
CREAT SERPL-MCNC: 0.94 MG/DL (ref 0.6–1.3)
EOSINOPHIL # BLD AUTO: 0.05 THOUSAND/ΜL (ref 0–0.61)
EOSINOPHIL NFR BLD AUTO: 1 % (ref 0–6)
ERYTHROCYTE [DISTWIDTH] IN BLOOD BY AUTOMATED COUNT: 11.9 % (ref 11.6–15.1)
GFR SERPL CREATININE-BSD FRML MDRD: 93 ML/MIN/1.73SQ M
GLUCOSE SERPL-MCNC: 88 MG/DL (ref 65–140)
GLUCOSE UR STRIP-MCNC: NEGATIVE MG/DL
HCT VFR BLD AUTO: 45.5 % (ref 36.5–49.3)
HGB BLD-MCNC: 16.4 G/DL (ref 12–17)
HGB UR QL STRIP.AUTO: NEGATIVE
IMM GRANULOCYTES # BLD AUTO: 0.02 THOUSAND/UL (ref 0–0.2)
IMM GRANULOCYTES NFR BLD AUTO: 0 % (ref 0–2)
KETONES UR STRIP-MCNC: NEGATIVE MG/DL
LEUKOCYTE ESTERASE UR QL STRIP: NEGATIVE
LIPASE SERPL-CCNC: 21 U/L (ref 11–82)
LYMPHOCYTES # BLD AUTO: 2.16 THOUSANDS/ΜL (ref 0.6–4.47)
LYMPHOCYTES NFR BLD AUTO: 36 % (ref 14–44)
MCH RBC QN AUTO: 31.5 PG (ref 26.8–34.3)
MCHC RBC AUTO-ENTMCNC: 36 G/DL (ref 31.4–37.4)
MCV RBC AUTO: 88 FL (ref 82–98)
MONOCYTES # BLD AUTO: 0.6 THOUSAND/ΜL (ref 0.17–1.22)
MONOCYTES NFR BLD AUTO: 10 % (ref 4–12)
NEUTROPHILS # BLD AUTO: 3.15 THOUSANDS/ΜL (ref 1.85–7.62)
NEUTS SEG NFR BLD AUTO: 53 % (ref 43–75)
NITRITE UR QL STRIP: NEGATIVE
NRBC BLD AUTO-RTO: 0 /100 WBCS
PH UR STRIP.AUTO: 7 [PH]
PLATELET # BLD AUTO: 296 THOUSANDS/UL (ref 149–390)
PMV BLD AUTO: 10.2 FL (ref 8.9–12.7)
POTASSIUM SERPL-SCNC: 4 MMOL/L (ref 3.5–5.3)
PROT SERPL-MCNC: 6.9 G/DL (ref 6.4–8.4)
PROT UR STRIP-MCNC: NEGATIVE MG/DL
RBC # BLD AUTO: 5.2 MILLION/UL (ref 3.88–5.62)
SODIUM SERPL-SCNC: 140 MMOL/L (ref 135–147)
SP GR UR STRIP.AUTO: 1.02 (ref 1–1.03)
UROBILINOGEN UR STRIP-ACNC: <2 MG/DL
WBC # BLD AUTO: 5.99 THOUSAND/UL (ref 4.31–10.16)

## 2024-10-09 PROCEDURE — 85025 COMPLETE CBC W/AUTO DIFF WBC: CPT | Performed by: PHYSICIAN ASSISTANT

## 2024-10-09 PROCEDURE — 80053 COMPREHEN METABOLIC PANEL: CPT | Performed by: PHYSICIAN ASSISTANT

## 2024-10-09 PROCEDURE — 74177 CT ABD & PELVIS W/CONTRAST: CPT

## 2024-10-09 PROCEDURE — 81003 URINALYSIS AUTO W/O SCOPE: CPT | Performed by: PHYSICIAN ASSISTANT

## 2024-10-09 PROCEDURE — 99213 OFFICE O/P EST LOW 20 MIN: CPT | Performed by: FAMILY MEDICINE

## 2024-10-09 PROCEDURE — 36415 COLL VENOUS BLD VENIPUNCTURE: CPT | Performed by: PHYSICIAN ASSISTANT

## 2024-10-09 PROCEDURE — 83690 ASSAY OF LIPASE: CPT | Performed by: PHYSICIAN ASSISTANT

## 2024-10-09 PROCEDURE — 99284 EMERGENCY DEPT VISIT MOD MDM: CPT

## 2024-10-09 PROCEDURE — 99285 EMERGENCY DEPT VISIT HI MDM: CPT | Performed by: PHYSICIAN ASSISTANT

## 2024-10-09 RX ORDER — METRONIDAZOLE 500 MG/1
500 TABLET ORAL EVERY 8 HOURS SCHEDULED
Qty: 30 TABLET | Refills: 0 | Status: SHIPPED | OUTPATIENT
Start: 2024-10-09 | End: 2024-10-19

## 2024-10-09 RX ORDER — METRONIDAZOLE 500 MG/1
500 TABLET ORAL ONCE
Status: COMPLETED | OUTPATIENT
Start: 2024-10-09 | End: 2024-10-09

## 2024-10-09 RX ORDER — CIPROFLOXACIN 500 MG/1
500 TABLET, FILM COATED ORAL 2 TIMES DAILY
Qty: 20 TABLET | Refills: 0 | Status: SHIPPED | OUTPATIENT
Start: 2024-10-09 | End: 2024-10-19

## 2024-10-09 RX ORDER — CIPROFLOXACIN 500 MG/1
500 TABLET, FILM COATED ORAL ONCE
Status: COMPLETED | OUTPATIENT
Start: 2024-10-09 | End: 2024-10-09

## 2024-10-09 RX ADMIN — IOHEXOL 100 ML: 350 INJECTION, SOLUTION INTRAVENOUS at 15:37

## 2024-10-09 RX ADMIN — METRONIDAZOLE 500 MG: 500 TABLET ORAL at 16:27

## 2024-10-09 RX ADMIN — CIPROFLOXACIN 500 MG: 500 TABLET ORAL at 16:27

## 2024-10-09 NOTE — PROGRESS NOTES
[facility]        NAME: Eloy Thomas is a 49 y.o. male  : 1975    MRN: [id number]  DATE: 2024  TIME: 2:12 PM    Assessment and Plan   Left lower quadrant abdominal pain [R10.32]  1. Left lower quadrant abdominal pain              Patient Instructions   Referral to ED for LLQ pain evaluation.     Follow up with PCP in 3-5 days.  Proceed to  ER if symptoms worsen.    If tests have been performed at [facility], our office will contact you with results if changes need to be made to the care plan discussed with you at the visit.  [facility]    Chief Complaint     Chief Complaint   Patient presents with    Abdominal Pain     Patient with pain across his lower abdomen. Patient states cramping and soreness, with increased pain on the LLQ x2 days.          History of Present Illness       49-year-old male with previous history of acute diverticulitis presenting with abdominal pain.  He states pain is located across the bottom of his belly and radiates as a sharp sensation to the left lower side of his belly.  He currently rates his pain as a 7 out of 10 however at its worst it does quickly become a 10 out of 10.  Denies any nausea or vomiting.    Abdominal Pain        Review of Systems   Review of Systems   Constitutional: Negative.    HENT: Negative.     Eyes: Negative.    Respiratory: Negative.     Cardiovascular: Negative.    Gastrointestinal:  Positive for abdominal pain.   Genitourinary: Negative.    Skin: Negative.    Allergic/Immunologic: Negative.    Neurological: Negative.    Hematological: Negative.    Psychiatric/Behavioral: Negative.           Current Medications       Current Outpatient Medications:     aspirin (ECOTRIN LOW STRENGTH) 81 mg EC tablet, Take 81 mg by mouth daily, Disp: , Rfl:     atorvastatin (LIPITOR) 40 mg tablet, Take 1 tablet by mouth once daily, Disp: 30 tablet, Rfl: 5    Cholecalciferol (VITAMIN D3) 2000 units TABS,  Take 1 tablet by mouth daily, Disp: , Rfl:     [employer] PO, Take 2 each by mouth daily, Disp: , Rfl:     cyclobenzaprine (FLEXERIL) 10 mg tablet, Take 1 tablet (10 mg total) by mouth 3 (three) times a day as needed for muscle spasms, Disp: 30 tablet, Rfl: 0    dorzolamide-timolol (COSOPT) 22.3-6.8 MG/ML ophthalmic solution, INSTILL 1 DROP INTO LEFT EYE TWICE DAILY, Disp: , Rfl:     fluorometholone (FML) 0.1 % ophthalmic suspension, Administer 1 drop into the left eye daily, Disp: , Rfl:     fluticasone (FLONASE) 50 mcg/act nasal spray, 1 spray into each nostril daily, Disp: 16 g, Rfl: 5    lisinopril (ZESTRIL) 20 mg tablet, Take 1 tablet by mouth once daily, Disp: 30 tablet, Rfl: 5    metoprolol succinate (TOPROL-XL) 50 mg 24 hr tablet, Take 1 tablet by mouth once daily, Disp: 30 tablet, Rfl: 5    nitroglycerin (Nitrostat) 0.4 mg SL tablet, Place 1 tablet (0.4 mg total) under the tongue every 5 (five) minutes as needed for chest pain, Disp: 25 tablet, Rfl: 3    pantoprazole (PROTONIX) 40 mg tablet, Take 1 tablet by mouth once daily, Disp: 30 tablet, Rfl: 5    Current Allergies     Allergies as of 10/09/2024 - Reviewed 10/09/2024   Allergen Reaction Noted    Penicillins Anaphylaxis and Other (See Comments) 09/11/2015    Azithromycin  10/29/2015    Latex Rash 08/20/2021            The following portions of the patient's history were reviewed and updated as appropriate: allergies, current medications, past family history, past medical history, past social history, past surgical history and problem list.     Past Medical History:   Diagnosis Date    Anterolateral myocardial infarction (HCC)     last assessed 10/30/17    Chest pain     Coronary artery disease     Glaucoma     Heart disease     Hypercholesterolemia     Hypertension     Myocardial infarction (HCC)     Uveitis     Visual impairment        Past Surgical History:   Procedure Laterality Date    APPENDECTOMY      CARDIAC CATHETERIZATION       CARDIAC SURGERY      CORONARY STENT PLACEMENT      EYE SURGERY      TONSILLECTOMY         Family History   Problem Relation Age of Onset    Blindness Mother         retinitis pigmentosis    Heart attack Father         1st heart attack at age 35- had CABG in past    Diabetes Father     Blindness Maternal Aunt         retinitis pigmentosis    Dislocations Family     Heart disease Family     Hypertension Family     Mental illness Neg Hx     Substance Abuse Neg Hx     Colon cancer Neg Hx     Colon polyps Neg Hx          Medications have been verified.        Objective   /90   Pulse 68   Temp 98.1 °F (36.7 °C)   Resp 16   SpO2 97%   No LMP for male patient.       Physical Exam     Physical Exam  Constitutional:       Appearance: He is well-developed.   HENT:      Head: Normocephalic.   Eyes:      Pupils: Pupils are equal, round, and reactive to light.   Cardiovascular:      Rate and Rhythm: Normal rate and regular rhythm.   Pulmonary:      Effort: Pulmonary effort is normal.   Abdominal:      General: Abdomen is flat. Bowel sounds are normal.      Palpations: Abdomen is soft.      Tenderness: There is abdominal tenderness in the left lower quadrant. There is guarding and rebound.   Musculoskeletal:         General: Normal range of motion.      Cervical back: Normal range of motion.   Skin:     General: Skin is warm.   Neurological:      Mental Status: He is alert.

## 2024-10-09 NOTE — ED PROVIDER NOTES
Final diagnoses:   Abdominal pain   Acute diverticulitis     ED Disposition       ED Disposition   Discharge    Condition   Stable    Date/Time   Wed Oct 9, 2024  4:16 PM    Comment   Eloy Thomas discharge to home/self care.                   Assessment & Plan       Medical Decision Making  Amount and/or Complexity of Data Reviewed  Labs: ordered.  Radiology: ordered.    Risk  Prescription drug management.    Patient with lower abdominal pain, will order labs, CT scan to r/o diverticulitis, colitis, cystitis.  Patient with acute diverticulitis on CT scan, no perforation, no abscess, will d/c on oral abx with strict return precautions.  Patient instructed to f/u with GI for recheck and possible colonoscopy.         Medications   iohexol (OMNIPAQUE) 350 MG/ML injection (MULTI-DOSE) 100 mL (100 mL Intravenous Given 10/9/24 1537)   metroNIDAZOLE (FLAGYL) tablet 500 mg (500 mg Oral Given 10/9/24 1627)   ciprofloxacin (CIPRO) tablet 500 mg (500 mg Oral Given 10/9/24 1627)       ED Risk Strat Scores                                               History of Present Illness       Chief Complaint   Patient presents with    Abdominal Pain     Comes to ed from urgent care for lower abdominal pain x 2 days. +diarrhea. Denies N/V. Hx of diverticulitis.       Past Medical History:   Diagnosis Date    Anterolateral myocardial infarction (HCC)     last assessed 10/30/17    Chest pain     Coronary artery disease     Glaucoma     Heart disease     Hypercholesterolemia     Hypertension     Myocardial infarction (HCC)     Uveitis     Visual impairment       Past Surgical History:   Procedure Laterality Date    APPENDECTOMY      CARDIAC CATHETERIZATION      CARDIAC SURGERY      CORONARY STENT PLACEMENT      EYE SURGERY      TONSILLECTOMY        Family History   Problem Relation Age of Onset    Blindness Mother         retinitis pigmentosis    Heart attack Father         1st heart attack at age 35- had CABG in past    Diabetes  Father     Blindness Maternal Aunt         retinitis pigmentosis    Dislocations Family     Heart disease Family     Hypertension Family     Mental illness Neg Hx     Substance Abuse Neg Hx     Colon cancer Neg Hx     Colon polyps Neg Hx       Social History     Tobacco Use    Smoking status: Former    Smokeless tobacco: Never   Vaping Use    Vaping status: Never Used   Substance Use Topics    Alcohol use: No    Drug use: Yes     Types: Marijuana      E-Cigarette/Vaping    E-Cigarette Use Never User       E-Cigarette/Vaping Substances    Nicotine No     THC No     CBD No     Flavoring No     Other No     Unknown No       I have reviewed and agree with the history as documented.       Abdominal Pain  Associated symptoms: diarrhea    Associated symptoms: no chest pain, no chills, no cough, no dysuria, no fever, no nausea, no shortness of breath and no vomiting      Patient is a 48 y/o M that presents to the ED with lower abdominal pain that started yesterday.  He has a history of diverticulitis and this feels similar.  No fevers/chills, nausea or vomiting. He had diarrhea yesterday.  No urinary symptoms.  Pain is worse with movement, better at rest.  He took advil for the pain, but it didn't help.     Review of Systems   Constitutional:  Negative for chills and fever.   HENT: Negative.     Respiratory:  Negative for cough and shortness of breath.    Cardiovascular:  Negative for chest pain, palpitations and leg swelling.   Gastrointestinal:  Positive for abdominal pain and diarrhea. Negative for blood in stool, nausea and vomiting.   Genitourinary:  Negative for dysuria.   Musculoskeletal:  Negative for back pain and neck pain.   Skin:  Negative for color change and rash.   Neurological:  Negative for dizziness, weakness and light-headedness.   Psychiatric/Behavioral:  Negative for confusion.    All other systems reviewed and are negative.          Objective       ED Triage Vitals [10/09/24 1404]   Temperature Pulse  Blood Pressure Respirations SpO2 Patient Position - Orthostatic VS   98.5 °F (36.9 °C) 69 120/99 16 98 % Sitting      Temp Source Heart Rate Source BP Location FiO2 (%) Pain Score    Oral Monitor Right arm -- 7      Vitals      Date and Time Temp Pulse SpO2 Resp BP Pain Score FACES Pain Rating User   10/09/24 1615 -- 61 97 % 17 115/86 -- -- SS   10/09/24 1404 98.5 °F (36.9 °C) 69 98 % 16 120/99 7 -- BY            Physical Exam  Vitals and nursing note reviewed.   Constitutional:       General: He is not in acute distress.     Appearance: He is well-developed, well-groomed and normal weight. He is not ill-appearing or diaphoretic.   HENT:      Head: Normocephalic and atraumatic.      Nose: Nose normal.      Mouth/Throat:      Mouth: Mucous membranes are moist.      Pharynx: Oropharynx is clear.   Eyes:      Conjunctiva/sclera: Conjunctivae normal.   Cardiovascular:      Rate and Rhythm: Normal rate and regular rhythm.      Heart sounds: Normal heart sounds.   Pulmonary:      Effort: Pulmonary effort is normal.      Breath sounds: Normal breath sounds. No wheezing, rhonchi or rales.   Abdominal:      General: Abdomen is flat. Bowel sounds are normal.      Palpations: Abdomen is soft.      Tenderness: There is abdominal tenderness in the right lower quadrant, suprapubic area and left lower quadrant. There is no guarding or rebound.   Musculoskeletal:         General: Normal range of motion.      Cervical back: Normal range of motion.      Right lower leg: No edema.      Left lower leg: No edema.   Skin:     General: Skin is warm and dry.      Coloration: Skin is not jaundiced or pale.      Findings: No rash.   Neurological:      General: No focal deficit present.      Mental Status: He is alert and oriented to person, place, and time.      Motor: No weakness.   Psychiatric:         Mood and Affect: Mood normal.         Behavior: Behavior is cooperative.         Results Reviewed       Procedure Component Value Units  Date/Time    Comprehensive metabolic panel [[id number]]  (Abnormal) Collected: 10/09/24 1444    Lab Status: Final result Specimen: Blood from Arm, Right Updated: 10/09/24 1513     Sodium 140 mmol/L      Potassium 4.0 mmol/L      Chloride 105 mmol/L      CO2 29 mmol/L      ANION GAP 6 mmol/L      BUN 10 mg/dL      Creatinine 0.94 mg/dL      Glucose 88 mg/dL      Calcium 9.3 mg/dL      AST 25 U/L      ALT 29 U/L      Alkaline Phosphatase 62 U/L      Total Protein 6.9 g/dL      Albumin 4.5 g/dL      Total Bilirubin 1.10 mg/dL      eGFR 93 ml/min/1.73sq m     Narrative:      National Kidney Disease Foundation guidelines for Chronic Kidney Disease (CKD):     Stage 1 with normal or high GFR (GFR > 90 mL/min/1.73 square meters)    Stage 2 Mild CKD (GFR = 60-89 mL/min/1.73 square meters)    Stage 3A Moderate CKD (GFR = 45-59 mL/min/1.73 square meters)    Stage 3B Moderate CKD (GFR = 30-44 mL/min/1.73 square meters)    Stage 4 Severe CKD (GFR = 15-29 mL/min/1.73 square meters)    Stage 5 End Stage CKD (GFR <15 mL/min/1.73 square meters)  Note: GFR calculation is accurate only with a steady state creatinine    Lipase [[id number]]  (Normal) Collected: 10/09/24 1444    Lab Status: Final result Specimen: Blood from Arm, Right Updated: 10/09/24 1513     Lipase 21 u/L     CBC and differential [[id number]] Collected: 10/09/24 1444    Lab Status: Final result Specimen: Blood from Arm, Right Updated: 10/09/24 1456     WBC 5.99 Thousand/uL      RBC 5.20 Million/uL      Hemoglobin 16.4 g/dL      Hematocrit 45.5 %      MCV 88 fL      MCH 31.5 pg      MCHC 36.0 g/dL      RDW 11.9 %      MPV 10.2 fL      Platelets 296 Thousands/uL      nRBC 0 /100 WBCs      Segmented % 53 %      Immature Grans % 0 %      Lymphocytes % 36 %      Monocytes % 10 %      Eosinophils Relative 1 %      Basophils Relative 0 %      Absolute Neutrophils 3.15 Thousands/µL      Absolute Immature Grans 0.02 Thousand/uL      Absolute Lymphocytes 2.16 Thousands/µL       Absolute Monocytes 0.60 Thousand/µL      Eosinophils Absolute 0.05 Thousand/µL      Basophils Absolute 0.01 Thousands/µL     UA w Reflex to Microscopic w Reflex to Culture [[id number]] Collected: 10/09/24 1430    Lab Status: Final result Specimen: Urine, Clean Catch Updated: 10/09/24 1445     Color, UA Yellow     Clarity, UA Clear     Specific Gravity, UA 1.020     pH, UA 7.0     Leukocytes, UA Negative     Nitrite, UA Negative     Protein, UA Negative mg/dl      Glucose, UA Negative mg/dl      Ketones, UA Negative mg/dl      Urobilinogen, UA <2.0 mg/dl      Bilirubin, UA Negative     Occult Blood, UA Negative            CT abdomen pelvis with contrast   Final Interpretation by Ajit Morel MD (10/09 161)      Uncomplicated sigmoid diverticulitis.         Workstation performed: [id number]             Procedures    ED Medication and Procedure Management   Prior to Admission Medications   Prescriptions Last Dose Informant Patient Reported? Taking?   [facility] FIBER GUMMY BEARS CHILDREN PO  Self Yes No   Sig: Take 2 each by mouth daily   Cholecalciferol (VITAMIN D3) 2000 units TABS  Self Yes No   Sig: Take 1 tablet by mouth daily   aspirin (ECOTRIN LOW STRENGTH) 81 mg EC tablet  Self Yes No   Sig: Take 81 mg by mouth daily   atorvastatin (LIPITOR) 40 mg tablet   No No   Sig: Take 1 tablet by mouth once daily   cyclobenzaprine (FLEXERIL) 10 mg tablet   No No   Sig: Take 1 tablet (10 mg total) by mouth 3 (three) times a day as needed for muscle spasms   dorzolamide-timolol (COSOPT) 22.3-6.8 MG/ML ophthalmic solution  Self Yes No   Sig: INSTILL 1 DROP INTO LEFT EYE TWICE DAILY   fluorometholone (FML) 0.1 % ophthalmic suspension  Self Yes No   Sig: Administer 1 drop into the left eye daily   fluticasone (FLONASE) 50 mcg/act nasal spray   No No   Si spray into each nostril daily   lisinopril (ZESTRIL) 20 mg tablet   No No   Sig: Take 1 tablet by mouth once daily   metoprolol succinate (TOPROL-XL) 50 mg 24 hr tablet    No No   Sig: Take 1 tablet by mouth once daily   nitroglycerin (Nitrostat) 0.4 mg SL tablet   No No   Sig: Place 1 tablet (0.4 mg total) under the tongue every 5 (five) minutes as needed for chest pain   pantoprazole (PROTONIX) 40 mg tablet   No No   Sig: Take 1 tablet by mouth once daily      Facility-Administered Medications: None     Patient's Medications   Discharge Prescriptions    CIPROFLOXACIN (CIPRO) 500 MG TABLET    Take 1 tablet (500 mg total) by mouth 2 (two) times a day for 10 days       Start Date: 10/9/2024 End Date: 10/19/2024       Order Dose: 500 mg       Quantity: 20 tablet    Refills: 0    METRONIDAZOLE (FLAGYL) 500 MG TABLET    Take 1 tablet (500 mg total) by mouth every 8 (eight) hours for 10 days       Start Date: 10/9/2024 End Date: 10/19/2024       Order Dose: 500 mg       Quantity: 30 tablet    Refills: 0     No discharge procedures on file.  ED SEPSIS DOCUMENTATION   Time reflects when diagnosis was documented in both MDM as applicable and the Disposition within this note       Time User Action Codes Description Comment    10/9/2024  4:16 PM Zohreh Jorge [R10.9] Abdominal pain     10/9/2024  4:16 PM Zohreh Jorge [K57.92] Acute diverticulitis                  Zohreh Jorge PA-C  10/09/24 0404

## 2024-10-09 NOTE — DISCHARGE INSTRUCTIONS
Rest, increase fluids.  Clear liquid diet for next 48 hours, then slowly advance food.  Take antibiotics as directed.  Follow up with GI doctor for recheck in 1 week.  Return to ER if symptoms worsen, vomiting, fevers.

## 2024-10-10 ENCOUNTER — DOCUMENTATION (OUTPATIENT)
Dept: ADMINISTRATIVE | Facility: OTHER | Age: 49
End: 2024-10-10

## 2024-10-10 VITALS — DIASTOLIC BLOOD PRESSURE: 90 MMHG | SYSTOLIC BLOOD PRESSURE: 136 MMHG

## 2024-10-10 NOTE — PROGRESS NOTES
Blood pressure elevated  Appointment department: [facility]  Appointment provider: Rupert Joseph,   Blood pressure   10/09/24 1332 136/90   10/09/24 1317 136/90 10/10/24 10:00 AM    Patient was called after the Urgent Care visit Patient declined to schedule appointment.    And doesn't have a Blood Pressure Machine at home. He stated that he is on medication and doesn't want to follow up with pcp at this time.     Thank you.  Alejandra Lyons MA  [facility]

## 2024-11-13 ENCOUNTER — TELEPHONE (OUTPATIENT)
Age: 49
End: 2024-11-13

## 2024-11-13 NOTE — TELEPHONE ENCOUNTER
Patient is requesting an insurance referral for the following specialty:      Test Name / Order Name:     DX Code: Did not know    Date Of Service: 11/18    Location/Facility Name/Address/Phone #: [facility] [phone].    Location / Facility NPI: Did not know    Best Phone # To Reach The Patient:  [phone].

## 2024-11-14 DIAGNOSIS — Z01.00 EYE EXAM NORMAL: ICD-10-CM

## 2024-11-14 DIAGNOSIS — H40.9 GLAUCOMA, UNSPECIFIED GLAUCOMA TYPE, UNSPECIFIED LATERALITY: Primary | ICD-10-CM

## 2024-11-18 NOTE — TELEPHONE ENCOUNTER
Yael called stating that Dr. Pérez needs to be an OD instead of MD on his insurance referral.       NPI # [id number]

## 2024-12-16 DIAGNOSIS — I10 ESSENTIAL HYPERTENSION: ICD-10-CM

## 2024-12-17 RX ORDER — METOPROLOL SUCCINATE 50 MG/1
50 TABLET, EXTENDED RELEASE ORAL DAILY
Qty: 30 TABLET | Refills: 0 | Status: SHIPPED | OUTPATIENT
Start: 2024-12-17 | End: 2024-12-24 | Stop reason: SDUPTHER

## 2024-12-24 ENCOUNTER — OFFICE VISIT (OUTPATIENT)
Dept: FAMILY MEDICINE CLINIC | Facility: HOSPITAL | Age: 49
End: 2024-12-24
Payer: COMMERCIAL

## 2024-12-24 VITALS
DIASTOLIC BLOOD PRESSURE: 78 MMHG | SYSTOLIC BLOOD PRESSURE: 120 MMHG | WEIGHT: 180 LBS | HEIGHT: 68 IN | OXYGEN SATURATION: 98 % | BODY MASS INDEX: 27.28 KG/M2 | HEART RATE: 79 BPM

## 2024-12-24 DIAGNOSIS — M25.561 ACUTE PAIN OF RIGHT KNEE: ICD-10-CM

## 2024-12-24 DIAGNOSIS — Z00.00 ANNUAL PHYSICAL EXAM: Primary | ICD-10-CM

## 2024-12-24 DIAGNOSIS — E78.00 HYPERCHOLESTEROLEMIA: ICD-10-CM

## 2024-12-24 DIAGNOSIS — E78.00 HYPERCHOLESTEREMIA: ICD-10-CM

## 2024-12-24 DIAGNOSIS — I10 ESSENTIAL HYPERTENSION: ICD-10-CM

## 2024-12-24 DIAGNOSIS — Z12.5 SCREENING FOR PROSTATE CANCER: ICD-10-CM

## 2024-12-24 DIAGNOSIS — R10.12 LUQ PAIN: ICD-10-CM

## 2024-12-24 DIAGNOSIS — I10 PRIMARY HYPERTENSION: ICD-10-CM

## 2024-12-24 DIAGNOSIS — E55.9 VITAMIN D DEFICIENCY: ICD-10-CM

## 2024-12-24 DIAGNOSIS — R17 ELEVATED BILIRUBIN: ICD-10-CM

## 2024-12-24 DIAGNOSIS — I25.10 CORONARY ARTERY DISEASE INVOLVING NATIVE CORONARY ARTERY OF NATIVE HEART WITHOUT ANGINA PECTORIS: ICD-10-CM

## 2024-12-24 DIAGNOSIS — I25.2 OLD MYOCARDIAL INFARCTION: ICD-10-CM

## 2024-12-24 PROBLEM — I11.0 HYPERTENSIVE HEART DISEASE WITH HEART FAILURE (HCC): Status: RESOLVED | Noted: 2022-06-29 | Resolved: 2024-12-24

## 2024-12-24 PROCEDURE — 99396 PREV VISIT EST AGE 40-64: CPT | Performed by: INTERNAL MEDICINE

## 2024-12-24 RX ORDER — ATORVASTATIN CALCIUM 40 MG/1
40 TABLET, FILM COATED ORAL DAILY
Qty: 90 TABLET | Refills: 3 | Status: SHIPPED | OUTPATIENT
Start: 2024-12-24

## 2024-12-24 RX ORDER — PANTOPRAZOLE SODIUM 20 MG/1
20 TABLET, DELAYED RELEASE ORAL DAILY
Qty: 90 TABLET | Refills: 1 | Status: SHIPPED | OUTPATIENT
Start: 2024-12-24

## 2024-12-24 RX ORDER — LISINOPRIL 20 MG/1
20 TABLET ORAL DAILY
Qty: 90 TABLET | Refills: 3 | Status: SHIPPED | OUTPATIENT
Start: 2024-12-24

## 2024-12-24 RX ORDER — METOPROLOL SUCCINATE 50 MG/1
50 TABLET, EXTENDED RELEASE ORAL DAILY
Qty: 90 TABLET | Refills: 3 | Status: SHIPPED | OUTPATIENT
Start: 2024-12-24

## 2024-12-24 NOTE — PROGRESS NOTES
Adult Annual Physical  Name: Eloy Thomas      : 1975      MRN: [id number]  Encounter Provider: Fannie Nunes DO  Encounter Date: 2024   Encounter department: [facility] [address]    Assessment & Plan  Hypercholesterolemia    Orders:  •  atorvastatin (LIPITOR) 40 mg tablet; Take 1 tablet (40 mg total) by mouth daily  •  CBC and differential; Future  •  Comprehensive metabolic panel; Future  •  Lipid Panel with Direct LDL reflex; Future    Essential hypertension    Orders:  •  lisinopril (ZESTRIL) 20 mg tablet; Take 1 tablet (20 mg total) by mouth daily  •  metoprolol succinate (TOPROL-XL) 50 mg 24 hr tablet; Take 1 tablet (50 mg total) by mouth daily  •  TSH, 3rd generation with Free T4 reflex; Future    LUQ pain    Orders:  •  pantoprazole (PROTONIX) 20 mg tablet; Take 1 tablet (20 mg total) by mouth daily    Acute pain of right knee    Orders:  •  XR knee 3 vw right non injury; Future    Annual physical exam         Hypercholesteremia         Vitamin D deficiency    Orders:  •  Vitamin D 25 hydroxy; Future    Screening for prostate cancer    Orders:  •  PSA, Total Screen; Future    Old myocardial infarction         Primary hypertension         Coronary artery disease involving native coronary artery of native heart without angina pectoris  Had mi age 36- doing well since then -is on atorvastatin       Elevated bilirubin  No ruq or  abdominal pain issues       Immunizations and preventive care screenings were discussed with patient today. Appropriate education was printed on patient's after visit summary.    Discussed risks and benefits of prostate cancer screening. We discussed the controversial history of PSA screening for prostate cancer in the [place] as well as the risk of over detection and over treatment of prostate cancer by way of PSA screening.  The patient understands that PSA blood testing is an imperfect way to screen for prostate cancer and that elevated  PSA levels in the blood may also be caused by infection, inflammation, prostatic trauma or manipulation, urological procedures, or by benign prostatic enlargement.    The role of the digital rectal examination in prostate cancer screening was also discussed and I discussed with him that there is large interobserver variability in the findings of digital rectal examination.    Counseling:  Exercise: the importance of regular exercise/physical activity was discussed. Recommend exercise 3-5 times per week for at least 30 minutes.          History of Present Illness     Adult Annual Physical:  Patient presents for annual physical. To give acp form- wife is to be medical poa   Full code now but if no hope for recovery would not want long term support  .     Diet and Physical Activity:  - Diet/Nutrition: well balanced diet.  - Exercise: walking. walking for job    General Health:  - Sleep: sleeps well. 6-7 hours is usual  - Hearing: decreased hearing right ear.  - Vision: most recent eye exam < 1 year ago. uveitits and glaucoma  worse in left eye- seen every6 months  - Dental: regular dental visits.     Health:    - Urinary symptoms: none.     Advanced Care Planning:  - Has an advanced directive?: no    - Has a durable medical POA?: no    - ACP document given to patient?: yes      Review of Systems   HENT:  Negative for congestion.    Respiratory:  Negative for shortness of breath.    Cardiovascular:  Negative for chest pain and palpitations.   Gastrointestinal:         Will try decreasing the pantoprazole to 20 m g daily   Genitourinary:  Negative for difficulty urinating.   Musculoskeletal:  Negative for back pain.   All other systems reviewed and are negative.    Current Outpatient Medications on File Prior to Visit   Medication Sig Dispense Refill   • aspirin (ECOTRIN LOW STRENGTH) 81 mg EC tablet Take 81 mg by mouth daily     • Cholecalciferol (VITAMIN D3) 2000 units TABS Take 1 tablet by mouth daily     • [facility]  "FIBER GUMMY BEARS CHILDREN PO Take 2 each by mouth daily     • dorzolamide-timolol (COSOPT) 22.3-6.8 MG/ML ophthalmic solution INSTILL 1 DROP INTO LEFT EYE TWICE DAILY     • fluorometholone (FML) 0.1 % ophthalmic suspension Administer 1 drop into the left eye daily     • fluticasone (FLONASE) 50 mcg/act nasal spray 1 spray into each nostril daily 16 g 5   • [DISCONTINUED] atorvastatin (LIPITOR) 40 mg tablet Take 1 tablet by mouth once daily 30 tablet 5   • [DISCONTINUED] lisinopril (ZESTRIL) 20 mg tablet Take 1 tablet by mouth once daily 30 tablet 5   • [DISCONTINUED] metoprolol succinate (TOPROL-XL) 50 mg 24 hr tablet Take 1 tablet by mouth once daily 30 tablet 0   • [DISCONTINUED] pantoprazole (PROTONIX) 40 mg tablet Take 1 tablet by mouth once daily 30 tablet 5   • nitroglycerin (Nitrostat) 0.4 mg SL tablet Place 1 tablet (0.4 mg total) under the tongue every 5 (five) minutes as needed for chest pain (Patient not taking: Reported on 12/24/2024) 25 tablet 3   • [DISCONTINUED] cyclobenzaprine (FLEXERIL) 10 mg tablet Take 1 tablet (10 mg total) by mouth 3 (three) times a day as needed for muscle spasms (Patient not taking: Reported on 12/24/2024) 30 tablet 0     No current facility-administered medications on file prior to visit.      Social History     Tobacco Use   • Smoking status: Former   • Smokeless tobacco: Never   Vaping Use   • Vaping status: Never Used   Substance and Sexual Activity   • Alcohol use: No   • Drug use: Yes     Types: Marijuana   • Sexual activity: Not Currently       Objective   /78   Pulse 79   Ht 5' 8\" (1.727 m)   Wt 81.6 kg (180 lb)   SpO2 98%   BMI 27.37 kg/m²     Physical Exam  Vitals and nursing note reviewed.   Constitutional:       Appearance: He is well-developed. He is not toxic-appearing.   HENT:      Head: Atraumatic.      Right Ear: External ear normal.      Left Ear: External ear normal.      Nose: Nose normal.   Eyes:      Conjunctiva/sclera: Conjunctivae normal.    "   Pupils: Pupils are equal, round, and reactive to light.   Cardiovascular:      Rate and Rhythm: Normal rate and regular rhythm.      Heart sounds: Normal heart sounds. No murmur heard.  Pulmonary:      Effort: Pulmonary effort is normal.      Breath sounds: Normal breath sounds.   Abdominal:      General: Bowel sounds are normal.      Palpations: Abdomen is soft.   Musculoskeletal:         General: Tenderness present. Normal range of motion.      Cervical back: Normal range of motion and neck supple.      Comments: Right knee tender on patellar tendon      Skin:     General: Skin is warm and dry.   Neurological:      Mental Status: He is alert and oriented to person, place, and time.      Deep Tendon Reflexes: Reflexes are normal and symmetric.   Psychiatric:         Behavior: Behavior normal.         Thought Content: Thought content normal.

## 2024-12-30 ENCOUNTER — HOSPITAL ENCOUNTER (OUTPATIENT)
Dept: RADIOLOGY | Facility: HOSPITAL | Age: 49
Discharge: HOME | End: 2024-12-30
Payer: COMMERCIAL

## 2024-12-30 DIAGNOSIS — M25.561 ACUTE PAIN OF RIGHT KNEE: ICD-10-CM

## 2024-12-30 PROCEDURE — 73562 X-RAY EXAM OF KNEE 3: CPT

## 2024-12-31 ENCOUNTER — RESULTS FOLLOW-UP (OUTPATIENT)
Dept: FAMILY MEDICINE CLINIC | Facility: HOSPITAL | Age: 49
End: 2024-12-31

## 2024-12-31 LAB
25(OH)D3+25(OH)D2 SERPL-MCNC: 23.9 NG/ML (ref 30–100)
ALBUMIN SERPL-MCNC: 4.2 G/DL (ref 3.8–4.9)
ALP SERPL-CCNC: 63 IU/L (ref 44–121)
ALT SERPL-CCNC: 35 IU/L (ref 0–44)
AST SERPL-CCNC: 34 IU/L (ref 0–40)
BASOPHILS # BLD AUTO: 0 X10E3/UL (ref 0–0.2)
BASOPHILS NFR BLD AUTO: 1 %
BILIRUB SERPL-MCNC: 0.4 MG/DL (ref 0–1.2)
BUN SERPL-MCNC: 9 MG/DL (ref 6–24)
BUN/CREAT SERPL: 8 (ref 9–20)
CALCIUM SERPL-MCNC: 9.5 MG/DL (ref 8.7–10.2)
CHLORIDE SERPL-SCNC: 104 MMOL/L (ref 96–106)
CHOLEST SERPL-MCNC: 148 MG/DL (ref 100–199)
CO2 SERPL-SCNC: 24 MMOL/L (ref 20–29)
CREAT SERPL-MCNC: 1.11 MG/DL (ref 0.76–1.27)
EGFR: 80 ML/MIN/1.73
EOSINOPHIL # BLD AUTO: 0.1 X10E3/UL (ref 0–0.4)
EOSINOPHIL NFR BLD AUTO: 1 %
ERYTHROCYTE [DISTWIDTH] IN BLOOD BY AUTOMATED COUNT: 12.1 % (ref 11.6–15.4)
GLOBULIN SER-MCNC: 1.9 G/DL (ref 1.5–4.5)
GLUCOSE SERPL-MCNC: 105 MG/DL (ref 70–99)
HCT VFR BLD AUTO: 48.4 % (ref 37.5–51)
HDLC SERPL-MCNC: 41 MG/DL
HGB BLD-MCNC: 15.6 G/DL (ref 13–17.7)
IMM GRANULOCYTES # BLD: 0 X10E3/UL (ref 0–0.1)
IMM GRANULOCYTES NFR BLD: 1 %
LDLC SERPL CALC-MCNC: 85 MG/DL (ref 0–99)
LDLC/HDLC SERPL: 2.1 RATIO (ref 0–3.6)
LYMPHOCYTES # BLD AUTO: 1.9 X10E3/UL (ref 0.7–3.1)
LYMPHOCYTES NFR BLD AUTO: 39 %
MCH RBC QN AUTO: 29.4 PG (ref 26.6–33)
MCHC RBC AUTO-ENTMCNC: 32.2 G/DL (ref 31.5–35.7)
MCV RBC AUTO: 91 FL (ref 79–97)
MONOCYTES # BLD AUTO: 0.4 X10E3/UL (ref 0.1–0.9)
MONOCYTES NFR BLD AUTO: 7 %
NEUTROPHILS # BLD AUTO: 2.6 X10E3/UL (ref 1.4–7)
NEUTROPHILS NFR BLD AUTO: 51 %
PLATELET # BLD AUTO: 259 X10E3/UL (ref 150–450)
POTASSIUM SERPL-SCNC: 5 MMOL/L (ref 3.5–5.2)
PROT SERPL-MCNC: 6.1 G/DL (ref 6–8.5)
PSA FREE MFR SERPL: 45.4 %
PSA FREE SERPL-MCNC: 0.59 NG/ML
PSA SERPL-MCNC: 1.3 NG/ML (ref 0–4)
PSA SERPL-MCNC: 1.4 NG/ML (ref 0–4)
RBC # BLD AUTO: 5.31 X10E6/UL (ref 4.14–5.8)
SL AMB VLDL CHOLESTEROL CALC: 22 MG/DL (ref 5–40)
SODIUM SERPL-SCNC: 140 MMOL/L (ref 134–144)
TRIGL SERPL-MCNC: 122 MG/DL (ref 0–149)
TSH SERPL DL<=0.005 MIU/L-ACNC: 1.22 UIU/ML (ref 0.45–4.5)
WBC # BLD AUTO: 5 X10E3/UL (ref 3.4–10.8)

## 2025-01-02 DIAGNOSIS — R73.01 IMPAIRED FASTING GLUCOSE: ICD-10-CM

## 2025-01-02 DIAGNOSIS — I10 ESSENTIAL HYPERTENSION: ICD-10-CM

## 2025-01-02 DIAGNOSIS — E55.9 VITAMIN D DEFICIENCY: Primary | ICD-10-CM

## 2025-01-02 NOTE — PROGRESS NOTES
Assessment/Plan:         Diagnoses and all orders for this visit:    Acute suppurative otitis media of both ears without spontaneous rupture of tympanic membranes, recurrence not specified  -     cefuroxime (CEFTIN) 250 mg tablet; Take 1 tablet (250 mg total) by mouth every 12 (twelve) hours for 10 days    Other orders  -     meloxicam (MOBIC) 7 5 mg tablet; TAKE 1 TABLET BY MOUTH TWICE DAILY AS NEEDED FOR MILD OR MODERATE PAIN        Subjective:      Patient ID: Otis Buckley is a 50 y o  male  50year old white male c/o ear pain since last week, bilateral;  Friday, 7/24/2020  Patient wears ear plugs, which causes pain  Also very congested  Did not try otc meds  Review of Systems   Constitutional: Positive for fatigue  Negative for chills, diaphoresis and fever  HENT: Positive for congestion, ear pain and rhinorrhea  Negative for sore throat  Respiratory: Negative for cough, chest tightness and shortness of breath  Neurological: Negative for dizziness, light-headedness and headaches  Objective:      /76   Pulse 70   Temp 98 8 °F (37 1 °C) (Tympanic)   Ht 5' 8" (1 727 m)   Wt 77 1 kg (170 lb)   SpO2 97%   BMI 25 85 kg/m²          Physical Exam   Constitutional: He appears well-developed and well-nourished  No distress  HENT:   Head: Normocephalic and atraumatic  Nose: Nose normal    Mouth/Throat: Oropharynx is clear and moist  No oropharyngeal exudate  TM bulging and erythematous, with congestion noted in middle ears  Pulmonary/Chest: Effort normal and breath sounds normal  No stridor  No respiratory distress  He has no wheezes  He has no rales  He exhibits no tenderness  Musculoskeletal: Normal range of motion  He exhibits no edema, tenderness or deformity  Skin: He is not diaphoretic  Nursing note and vitals reviewed  Medication(s) Requested: Metoprolol  Last office visit: 12-  Last refill: 11-  Is the patient due for refill of this medication(s): Yes  Criteria met. Forwarded to Physician/DANNI for signature.

## 2025-01-03 DIAGNOSIS — R73.01 IMPAIRED FASTING GLUCOSE: Primary | ICD-10-CM

## 2025-01-03 DIAGNOSIS — E55.9 VITAMIN D DEFICIENCY: Primary | ICD-10-CM

## 2025-01-03 DIAGNOSIS — I10 PRIMARY HYPERTENSION: ICD-10-CM

## 2025-01-03 DIAGNOSIS — E78.00 HYPERCHOLESTEREMIA: ICD-10-CM

## 2025-03-04 ENCOUNTER — TELEPHONE (OUTPATIENT)
Age: 50
End: 2025-03-04

## 2025-03-04 DIAGNOSIS — K64.9 HEMORRHOIDS, UNSPECIFIED HEMORRHOID TYPE: Primary | ICD-10-CM

## 2025-03-04 NOTE — TELEPHONE ENCOUNTER
Patient called to request a referral for his upcoming apt with our [facility] specialists on 03/7 at 9 am. The apt is showing as scheduled for new patient consult. Patient reported normally needing a referral from his insurance. Can we please verify this has either already been completed and patient is ready to go for the apt, or if we do need to obtain the referral. Please follow up with patient to update him on the status. Thank you

## 2025-03-07 ENCOUNTER — TELEPHONE (OUTPATIENT)
Dept: GASTROENTEROLOGY | Facility: CLINIC | Age: 50
End: 2025-03-07

## 2025-03-07 ENCOUNTER — OFFICE VISIT (OUTPATIENT)
Dept: GASTROENTEROLOGY | Facility: CLINIC | Age: 50
End: 2025-03-07
Payer: COMMERCIAL

## 2025-03-07 VITALS
BODY MASS INDEX: 26.83 KG/M2 | HEIGHT: 68 IN | SYSTOLIC BLOOD PRESSURE: 124 MMHG | DIASTOLIC BLOOD PRESSURE: 80 MMHG | WEIGHT: 177 LBS

## 2025-03-07 DIAGNOSIS — K64.9 HEMORRHOIDS, UNSPECIFIED HEMORRHOID TYPE: ICD-10-CM

## 2025-03-07 DIAGNOSIS — K62.5 RECTAL BLEEDING: Primary | ICD-10-CM

## 2025-03-07 DIAGNOSIS — Z12.11 SCREENING FOR COLORECTAL CANCER: ICD-10-CM

## 2025-03-07 DIAGNOSIS — Z12.12 SCREENING FOR COLORECTAL CANCER: ICD-10-CM

## 2025-03-07 PROCEDURE — 99203 OFFICE O/P NEW LOW 30 MIN: CPT | Performed by: INTERNAL MEDICINE

## 2025-03-07 RX ORDER — SODIUM CHLORIDE, SODIUM LACTATE, POTASSIUM CHLORIDE, CALCIUM CHLORIDE 600; 310; 30; 20 MG/100ML; MG/100ML; MG/100ML; MG/100ML
125 INJECTION, SOLUTION INTRAVENOUS CONTINUOUS
OUTPATIENT
Start: 2025-03-07

## 2025-03-07 NOTE — TELEPHONE ENCOUNTER
Scheduled date of colonoscopy (as of today): 3/25/25  Physician performing colonoscopy: MARU  Location of colonoscopy: [facility]  Bowel prep reviewed with patient: Sylvain/Michael  Instructions reviewed with patient by: DAVEY  Clearances: N

## 2025-03-07 NOTE — PROGRESS NOTES
Name: Eloy Thomas      : 1975      MRN: [id number]  Encounter Provider: Jorge Awad MD  Encounter Date: 3/7/2025   Encounter department: [facility]    :  Assessment & Plan  Rectal bleeding  - Recurrent discomfort and rectal bleeding likely in the setting of hemorrhoids.  - Previous banding in  provided temporary relief  - Referral to colorectal surgeon for evaluation and potential surgical intervention  - Prefers surgical removal over another banding  - Will evaluate with colonoscopy to see if repeat session of banding is feasible.    Orders:    Ambulatory Referral to Colorectal Surgery; Future    Hemorrhoids, unspecified hemorrhoid type    Orders:    Ambulatory Referral to Colorectal Surgery; Future    Screening for colorectal cancer  Last colonoscopy in .  Was recommended for recall in 5 years due to personal history of colon polyps.  Will also evaluate hemorrhoids during that time.  Schedule colonoscopy  Orders:    Colonoscopy; Future          Assessment & Plan  1. Hemorrhoids  - Recurrent discomfort and rectal bleeding  - Previous banding in  provided temporary relief  - Referral to colorectal surgeon for evaluation and potential surgical intervention  - Prefers surgical removal over another banding  - Recovery period post-surgery explained (2-6 weeks)    2. Rectal bleeding  - Associated with hemorrhoids, particularly in the mornings  - Colonoscopy scheduled for 2025 to assess hemorrhoid size and rule out other issues  - Bowel prep instructions provided: clear liquids the day before and bowel prep at 6 PM    PROCEDURE  Procedure Performed  3 banding sessions in   EGD in   Colonoscopy in     Specimen Removed  Biopsies from EGD in     Results       Other orders    lactated ringers infusion      Chief Complaint   Patient presents with    Hemorrhoids     Pt has been bleeding once a week, just in the morning most of the time, had  bandings in the past, possible surgery now       History of Present Illness  The patient is a 49-year-old male presenting for evaluation of hemorrhoids and rectal bleeding.    Hemorrhoidal Discomfort  - The patient reports recurrent hemorrhoidal discomfort, which has been particularly bothersome over the past few months.  - Accompanied by irritation and rectal bleeding during defecation.  - Believes that heavy lifting at work exacerbates his symptoms.  - Expresses concern regarding the recovery period following potential surgical intervention.  - Underwent hemorrhoidal banding in 2021, which provided temporary relief.    Straining During Defecation  - Occasionally experiences straining during defecation, especially when the hemorrhoids are inflamed.  - Currently, his bowel movements are normal.    Supplemental information: He is taking fiber supplements in the form of gummies.    MEDICATIONS  - Fiber gummies (current)       Historical Information   Past Medical History:   Diagnosis Date    Anterolateral myocardial infarction (HCC)     last assessed 10/30/17    Chest pain     Colon polyp     Coronary artery disease     Glaucoma     Heart disease     Hypercholesterolemia     Hypertension     Myocardial infarction (HCC)     Uveitis     Visual impairment      Past Surgical History:   Procedure Laterality Date    APPENDECTOMY      CARDIAC CATHETERIZATION      CARDIAC SURGERY      COLONOSCOPY      CORONARY STENT PLACEMENT      EYE SURGERY      TONSILLECTOMY       Social History     Substance and Sexual Activity   Alcohol Use No     Social History     Substance and Sexual Activity   Drug Use Yes    Types: Marijuana     Social History     Tobacco Use   Smoking Status Former   Smokeless Tobacco Never     Family History   Problem Relation Age of Onset    Blindness Mother         retinitis pigmentosis    Heart attack Father         1st heart attack at age 35- had CABG in past    Diabetes Father     Blindness Maternal Aunt       "   retinitis pigmentosis    Dislocations Family     Heart disease Family     Hypertension Family     Mental illness Neg Hx     Substance Abuse Neg Hx     Colon cancer Neg Hx     Colon polyps Neg Hx        Meds/Allergies     Current Outpatient Medications:     aspirin (ECOTRIN LOW STRENGTH) 81 mg EC tablet    atorvastatin (LIPITOR) 40 mg tablet    Cholecalciferol (VITAMIN D3) 2000 units TABS    [employer] FIB[employer] GUMMY BEARS CHILDREN PO    dorzolamide-timolol (COSOPT) 22.3-6.8 MG/ML ophthalmic solution    fluorometholone (FML) 0.1 % ophthalmic suspension    fluticasone (FLONASE) 50 mcg/act nasal spray    lisinopril (ZESTRIL) 20 mg tablet    metoprolol succinate (TOPROL-XL) 50 mg 24 hr tablet    pantoprazole (PROTONIX) 20 mg tablet    nitroglycerin (Nitrostat) 0.4 mg SL tablet  Allergies   Allergen Reactions    Penicillins Anaphylaxis and Other (See Comments)    Azithromycin      Annotation - [id number]: Listed in old chart.    Latex Rash       PHYSICAL EXAM:    Blood pressure 124/80, height 5' 8\" (1.727 m), weight 80.3 kg (177 lb). Body mass index is 26.91 kg/m².  Physical Exam      General Appearance: No apparent distress, cooperative, alert.  Eyes: Anicteric.  Gastrointestinal: Soft, non-tender, non-distended; normal bowel sounds; no masses, no organomegaly.    Rectal: Deferred.  Musculoskeletal: No edema.  Skin: No jaundice.     OTHER LAB RESULTS:   Lab Results   Component Value Date    WBC 5.0 12/30/2024    WBC 5.99 10/09/2024    WBC 5.20 05/18/2024    HGB 15.6 12/30/2024    HGB 16.4 10/09/2024    HGB 15.1 05/18/2024    MCV 91 12/30/2024     12/30/2024     10/09/2024     05/18/2024    INR 1.00 01/18/2020    INR 1.01 12/12/2015    INR 1.02 12/11/2015     Lab Results   Component Value Date     09/08/2017    K 5.0 12/30/2024     12/30/2024    CO2 24 12/30/2024    ANIONGAP 2 (L) 12/15/2015    BUN 9 12/30/2024    CREATININE 1.11 12/30/2024    GLUCOSE 84 09/08/2017    CALCIUM 9.3 10/09/2024    " "AST 34 12/30/2024    AST 25 10/09/2024    AST 20 05/18/2024    ALT 35 12/30/2024    ALT 29 10/09/2024    ALT 22 05/18/2024    ALKPHOS 62 10/09/2024    ALKPHOS 56 05/18/2024    ALKPHOS 56 12/13/2020    ALB 4.2 12/30/2024    TBILI 0.4 12/30/2024    TBILI 1.10 (H) 10/09/2024    TBILI 0.69 05/18/2024    EGFR 80 12/30/2024     No results found for: \"IRON\", \"TIBC\", \"FERRITIN\"  Lab Results   Component Value Date    LIPASE 21 10/09/2024       OTHER RADIOLOGY RESULTS:   No results found.  "

## 2025-03-11 ENCOUNTER — ANESTHESIA (OUTPATIENT)
Dept: ANESTHESIOLOGY | Facility: AMBULATORY SURGERY CENTER | Age: 50
End: 2025-03-11

## 2025-03-11 ENCOUNTER — ANESTHESIA EVENT (OUTPATIENT)
Dept: ANESTHESIOLOGY | Facility: AMBULATORY SURGERY CENTER | Age: 50
End: 2025-03-11

## 2025-03-11 ENCOUNTER — TELEMEDICINE (OUTPATIENT)
Dept: FAMILY MEDICINE CLINIC | Facility: HOSPITAL | Age: 50
End: 2025-03-11
Payer: COMMERCIAL

## 2025-03-11 VITALS — HEIGHT: 68 IN | WEIGHT: 177 LBS | BODY MASS INDEX: 26.83 KG/M2

## 2025-03-11 DIAGNOSIS — U07.1 COVID-19: Primary | ICD-10-CM

## 2025-03-11 PROCEDURE — 99213 OFFICE O/P EST LOW 20 MIN: CPT

## 2025-03-11 RX ORDER — BENZONATATE 200 MG/1
200 CAPSULE ORAL 3 TIMES DAILY PRN
Qty: 20 CAPSULE | Refills: 0 | Status: SHIPPED | OUTPATIENT
Start: 2025-03-11

## 2025-03-11 RX ORDER — ALBUTEROL SULFATE 90 UG/1
2 INHALANT RESPIRATORY (INHALATION) EVERY 6 HOURS PRN
Qty: 8.5 G | Refills: 0 | Status: SHIPPED | OUTPATIENT
Start: 2025-03-11

## 2025-03-11 NOTE — PROGRESS NOTES
"Virtual Regular VisitName: Eloy Thoams      : 1975      MRN: [id number]  Encounter Provider: Valorie Santos DO  Encounter Date: 3/11/2025   Encounter department: [facility] [address]  :  Assessment & Plan  COVID-19  -Does not qualify for Paxlovid at this time  -Will prescribe albuterol for SOB, tessalon pearls for cough  -Can c/w ibuprofen for myalgias & headache  -F/u PRN, does not need any work note per pt  Orders:    albuterol (ProAir HFA) 90 mcg/act inhaler; Inhale 2 puffs every 6 (six) hours as needed for wheezing    benzonatate (TESSALON) 200 MG capsule; Take 1 capsule (200 mg total) by mouth 3 (three) times a day as needed for cough        History of Present Illness     COVID positive 3/10, symptoms began 3/8  Fever, dry cough, exhausted, body aches, head ache, some SOB  Taking ibuprofen  Appetite: Normal  Sick contacts: wife and mother in law covid+ week prior, traveling on train ride  Assoc symptoms: Dyspnea on exertion  Home COVID test? Yes yesterday  Allergies? denies  Tobacco use hx: prior history of smoking, quit 15 years ago  Asthma hx: denies  Denies WARI    Would like medication for his symptoms      Review of Systems   Constitutional:  Positive for activity change, fatigue and fever. Negative for appetite change.   Respiratory:  Positive for cough and shortness of breath (with exertion). Negative for wheezing.    Musculoskeletal:  Positive for myalgias.   Allergic/Immunologic: Negative for environmental allergies.   Neurological:  Negative for headaches.       Objective   Ht 5' 8\" (1.727 m)   Wt 80.3 kg (177 lb)   BMI 26.91 kg/m²     Physical Exam  Vitals reviewed.   Constitutional:       General: He is not in acute distress.     Appearance: Normal appearance. He is not ill-appearing.   Neurological:      Mental Status: He is alert.   Psychiatric:         Mood and Affect: Mood normal.         Behavior: Behavior normal.         Administrative " Statements   Encounter provider Valorie Santos DO    The Patient is located at Home and in the following state in which I hold an active license [place].    The patient was identified by name and date of birth. Eloy Thomas was informed that this is a telemedicine visit and that the visit is being conducted through the Epic Embedded platform. He agrees to proceed..  My office door was closed. No one else was in the room.  He acknowledged consent and understanding of privacy and security of the video platform. The patient has agreed to participate and understands they can discontinue the visit at any time.    I have spent a total time of 10 minutes in caring for this patient on the day of the visit/encounter including Instructions for management, Patient and family education, Impressions, Counseling / Coordination of care, Documenting in the medical record, and Obtaining or reviewing history  , not including the time spent for establishing the audio/video connection.    Valorie Santos DO  Family Medicine

## 2025-03-13 NOTE — TELEPHONE ENCOUNTER
Pr rescheduled his procedure    Scheduled date of colonoscopy (as of today):5/2/25  Physician performing colonoscopy:Dr. Awad  Location of colonoscopy:[facility]  Bowel prep reviewed with patient:pt has prep instr already

## 2025-03-23 ENCOUNTER — RESULTS FOLLOW-UP (OUTPATIENT)
Dept: FAMILY MEDICINE CLINIC | Facility: HOSPITAL | Age: 50
End: 2025-03-23

## 2025-03-23 LAB
25(OH)D3+25(OH)D2 SERPL-MCNC: 37.3 NG/ML (ref 30–100)
BUN SERPL-MCNC: 14 MG/DL (ref 6–24)
BUN/CREAT SERPL: 13 (ref 9–20)
CALCIUM SERPL-MCNC: 9.4 MG/DL (ref 8.7–10.2)
CHLORIDE SERPL-SCNC: 102 MMOL/L (ref 96–106)
CO2 SERPL-SCNC: 23 MMOL/L (ref 20–29)
CREAT SERPL-MCNC: 1.04 MG/DL (ref 0.76–1.27)
EGFR: 87 ML/MIN/1.73
EST. AVERAGE GLUCOSE BLD GHB EST-MCNC: 137 MG/DL
GLUCOSE SERPL-MCNC: 103 MG/DL (ref 70–99)
HBA1C MFR BLD: 6.4 % (ref 4.8–5.6)
POTASSIUM SERPL-SCNC: 5.3 MMOL/L (ref 3.5–5.2)
SODIUM SERPL-SCNC: 138 MMOL/L (ref 134–144)

## 2025-03-24 NOTE — TELEPHONE ENCOUNTER
----- Message from Fannie Nunes DO sent at 3/23/2025 12:30 PM EDT -----  Vit d level is now normal- continue on same  supplement dose   Hba1c is 6.4% - in prediabetes range- limit sugars and watch diet and exercise. Re check in 6 months   Fasting glucose is mildly elevated at 102

## 2025-03-29 NOTE — PATIENT INSTRUCTIONS
Take tylenol 500mg two pills three times a day! You can discontinue celiac diet but call me if your pain gets much worse on non-celiac diet! No (0)

## 2025-04-16 ENCOUNTER — TELEPHONE (OUTPATIENT)
Dept: GASTROENTEROLOGY | Facility: CLINIC | Age: 50
End: 2025-04-16

## 2025-04-16 NOTE — TELEPHONE ENCOUNTER
Procedure confirmed  Colonoscopy     Via: Voice mail    Instructions given: Given to Patient at Visit     Prep Given: Miralax/Dulcolax    Call the office if there are any questions.     Colonoscopy confirmed, left message on voicemail with instructions BC

## 2025-04-18 ENCOUNTER — ANESTHESIA (OUTPATIENT)
Dept: ANESTHESIOLOGY | Facility: AMBULATORY SURGERY CENTER | Age: 50
End: 2025-04-18

## 2025-04-18 ENCOUNTER — ANESTHESIA EVENT (OUTPATIENT)
Dept: ANESTHESIOLOGY | Facility: AMBULATORY SURGERY CENTER | Age: 50
End: 2025-04-18

## 2025-04-24 ENCOUNTER — VBI (OUTPATIENT)
Dept: ADMINISTRATIVE | Facility: OTHER | Age: 50
End: 2025-04-24

## 2025-04-24 NOTE — TELEPHONE ENCOUNTER
04/24/25 10:43 AM     Chart reviewed for CRC: Colonoscopy was/were not submitted to the patient's insurance.     Laurita Caceres MA   [facility]

## 2025-05-02 ENCOUNTER — ANESTHESIA EVENT (OUTPATIENT)
Dept: GASTROENTEROLOGY | Facility: AMBULATORY SURGERY CENTER | Age: 50
End: 2025-05-02

## 2025-05-02 ENCOUNTER — ANESTHESIA (OUTPATIENT)
Dept: GASTROENTEROLOGY | Facility: AMBULATORY SURGERY CENTER | Age: 50
End: 2025-05-02

## 2025-05-02 ENCOUNTER — HOSPITAL ENCOUNTER (OUTPATIENT)
Dept: GASTROENTEROLOGY | Facility: AMBULATORY SURGERY CENTER | Age: 50
Discharge: HOME | End: 2025-05-02
Attending: INTERNAL MEDICINE
Payer: COMMERCIAL

## 2025-05-02 VITALS
RESPIRATION RATE: 19 BRPM | DIASTOLIC BLOOD PRESSURE: 92 MMHG | WEIGHT: 175 LBS | SYSTOLIC BLOOD PRESSURE: 118 MMHG | HEART RATE: 74 BPM | OXYGEN SATURATION: 99 % | BODY MASS INDEX: 26.52 KG/M2 | TEMPERATURE: 97.1 F | HEIGHT: 68 IN

## 2025-05-02 DIAGNOSIS — Z12.11 SCREENING FOR COLORECTAL CANCER: ICD-10-CM

## 2025-05-02 DIAGNOSIS — Z12.12 SCREENING FOR COLORECTAL CANCER: ICD-10-CM

## 2025-05-02 PROCEDURE — 88305 TISSUE EXAM BY PATHOLOGIST: CPT | Performed by: PATHOLOGY

## 2025-05-02 PROCEDURE — 45380 COLONOSCOPY AND BIOPSY: CPT | Performed by: INTERNAL MEDICINE

## 2025-05-02 PROCEDURE — 45385 COLONOSCOPY W/LESION REMOVAL: CPT | Performed by: INTERNAL MEDICINE

## 2025-05-02 RX ORDER — SODIUM CHLORIDE, SODIUM LACTATE, POTASSIUM CHLORIDE, CALCIUM CHLORIDE 600; 310; 30; 20 MG/100ML; MG/100ML; MG/100ML; MG/100ML
125 INJECTION, SOLUTION INTRAVENOUS CONTINUOUS
Status: DISCONTINUED | OUTPATIENT
Start: 2025-05-02 | End: 2025-05-06 | Stop reason: HOSPADM

## 2025-05-02 RX ORDER — LIDOCAINE HYDROCHLORIDE 10 MG/ML
INJECTION, SOLUTION EPIDURAL; INFILTRATION; INTRACAUDAL; PERINEURAL AS NEEDED
Status: DISCONTINUED | OUTPATIENT
Start: 2025-05-02 | End: 2025-05-02

## 2025-05-02 RX ORDER — PROPOFOL 10 MG/ML
INJECTION, EMULSION INTRAVENOUS AS NEEDED
Status: DISCONTINUED | OUTPATIENT
Start: 2025-05-02 | End: 2025-05-02

## 2025-05-02 RX ADMIN — LIDOCAINE HYDROCHLORIDE 50 MG: 10 INJECTION, SOLUTION EPIDURAL; INFILTRATION; INTRACAUDAL; PERINEURAL at 10:26

## 2025-05-02 RX ADMIN — PROPOFOL 100 MG: 10 INJECTION, EMULSION INTRAVENOUS at 10:26

## 2025-05-02 RX ADMIN — PROPOFOL 100 MG: 10 INJECTION, EMULSION INTRAVENOUS at 10:31

## 2025-05-02 RX ADMIN — SODIUM CHLORIDE, SODIUM LACTATE, POTASSIUM CHLORIDE, CALCIUM CHLORIDE 125 ML/HR: 600; 310; 30; 20 INJECTION, SOLUTION INTRAVENOUS at 10:16

## 2025-05-02 RX ADMIN — PROPOFOL 50 MG: 10 INJECTION, EMULSION INTRAVENOUS at 10:35

## 2025-05-02 NOTE — ANESTHESIA POSTPROCEDURE EVALUATION
Post-Op Assessment Note    CV Status:  Stable  Pain Score: 0    Pain management: adequate       Mental Status:  Sleepy   Hydration Status:  Stable   PONV Controlled:  None   Airway Patency:  Patent     Post Op Vitals Reviewed: Yes    No anethesia notable event occurred.    Staff: Anesthesiologist, CRNA           Last Filed PACU Vitals:  Vitals Value Taken Time   Temp     Pulse 76    /90    Resp 16    SpO2 98

## 2025-05-02 NOTE — ANESTHESIA PREPROCEDURE EVALUATION
Procedure:  COLONOSCOPY    Relevant Problems   CARDIO   (+) Coronary artery disease   (+) Grade III hemorrhoids   (+) HTN (hypertension)   (+) Hypercholesteremia   (+) Old myocardial infarction      MUSCULOSKELETAL   (+) Sciatica      NEURO/PSYCH   (+) Anxiety      S/p stent after his MI  Glaucoma    COVID(+)  ~1 month ago      Physical Exam    Airway    Mallampati score: II  TM Distance: >3 FB  Neck ROM: full     Dental   Comment: None loose     Cardiovascular      Pulmonary      Other Findings          ECHO (2021)  LEFT VENTRICLE: Size was normal. Systolic function was normal. Ejection fraction was estimated to be 65 %. There were no regional wall motion abnormalities. Wall thickness was normal. DOPPLER: Left ventricular diastolic function parameters  were normal.     RIGHT VENTRICLE: The size was normal. Systolic function was normal. Wall thickness was normal.     LEFT ATRIUM: Size was normal.     RIGHT ATRIUM: The atrium was mildly dilated.     MITRAL VALVE: Valve structure was normal. There was normal leaflet separation. DOPPLER: The transmitral velocity was within the normal range. There was no evidence for stenosis. There was mild regurgitation.     AORTIC VALVE: The valve was trileaflet. Leaflets exhibited normal thickness and normal cuspal separation. DOPPLER: Transaortic velocity was within the normal range. There was no evidence for stenosis. There was no significant  regurgitation.  Anesthesia Plan  ASA Score- 3     Anesthesia Type- IV sedation with anesthesia with ASA Monitors.         Additional Monitors:     Airway Plan:     Comment: Last of PO bowel prep: 05:30    Patient educated on the possibility for awareness under sedation and of the possibility of airway intervention in the event of an airway or procedural emergency  .       Plan Factors-Exercise tolerance (METS): >4 METS.    Chart reviewed.    Patient summary reviewed.    Patient is not a current smoker.              Induction-  intravenous.    Postoperative Plan-     Perioperative Resuscitation Plan - Level 1 - Full Code.       Informed Consent- Anesthetic plan and risks discussed with patient.  I personally reviewed this patient with the CRNA. Discussed and agreed on the Anesthesia Plan with the CRNA..      NPO Status:  Vitals Value Taken Time   Date of last liquid 05/02/25 05/02/25 1005   Time of last liquid 0430 05/02/25 1005   Date of last solid 04/30/25 05/02/25 1005   Time of last solid 1800 05/02/25 1005

## 2025-05-02 NOTE — H&P
History and Physical -  [facility]  Eloy Thomas 49 y.o. male MRN: [id number]    HPI: Eloy Thomas is a 49 y.o. male who presents for colonoscopy for history of polyps.    REVIEW OF SYSTEMS: Per the HPI, and otherwise unremarkable.    Historical Information   Past Medical History:   Diagnosis Date    Anterolateral myocardial infarction (HCC)     last assessed 10/30/17    Chest pain     Colon polyp     Coronary artery disease     Glaucoma     Heart disease     Hypercholesterolemia     Hypertension     Myocardial infarction (HCC)     Uveitis     Visual impairment      Past Surgical History:   Procedure Laterality Date    APPENDECTOMY      CARDIAC CATHETERIZATION      CARDIAC SURGERY      COLONOSCOPY      CORONARY STENT PLACEMENT      EYE SURGERY      TONSILLECTOMY       Social History   Social History     Substance and Sexual Activity   Alcohol Use No     Social History     Substance and Sexual Activity   Drug Use Yes    Types: Marijuana     Social History     Tobacco Use   Smoking Status Former   Smokeless Tobacco Never     Family History   Problem Relation Age of Onset    Blindness Mother         retinitis pigmentosis    Heart attack Father         1st heart attack at age 35- had CABG in past    Diabetes Father     Blindness Maternal Aunt         retinitis pigmentosis    Dislocations Family     Heart disease Family     Hypertension Family     Mental illness Neg Hx     Substance Abuse Neg Hx     Colon cancer Neg Hx     Colon polyps Neg Hx        Meds/Allergies       Current Outpatient Medications:     albuterol (ProAir HFA) 90 mcg/act inhaler    aspirin (ECOTRIN LOW STRENGTH) 81 mg EC tablet    atorvastatin (LIPITOR) 40 mg tablet    benzonatate (TESSALON) 200 MG capsule    Cholecalciferol (VITAMIN D3) 2000 units TABS    [employer] GUM[employer] CHILDREN PO    dorzolamide-timolol (COSOPT) 22.3-6.8 MG/ML ophthalmic solution    fluorometholone (FML) 0.1 % ophthalmic suspension    lisinopril  "(ZESTRIL) 20 mg tablet    metoprolol succinate (TOPROL-XL) 50 mg 24 hr tablet    pantoprazole (PROTONIX) 20 mg tablet    fluticasone (FLONASE) 50 mcg/act nasal spray    nitroglycerin (Nitrostat) 0.4 mg SL tablet    Current Facility-Administered Medications:     lactated ringers infusion, 125 mL/hr, Intravenous, Continuous, 125 mL/hr at 05/02/25 1016    Allergies   Allergen Reactions    Penicillins Anaphylaxis and Other (See Comments)    Azithromycin      Annotation - [id number]: Listed in old chart.    Latex Rash       Objective     /96   Pulse 60   Temp (!) 97.1 °F (36.2 °C) (Temporal)   Resp (!) 11   Ht 5' 8\" (1.727 m)   Wt 79.4 kg (175 lb)   SpO2 100%   BMI 26.61 kg/m²     PHYSICAL EXAM    Gen: NAD AAOx3  Head: Normocephalic, Atraumatic  CV: S1S2 RRR no m/r/g  CHEST: Clear b/l no c/r/w  ABD: soft, +BS NT/ND  EXT: no edema    ASSESSMENT/PLAN:  This is a 49 y.o. male here for colonoscopy, and he is stable and optimized for his procedure.        "

## 2025-05-07 PROCEDURE — 88305 TISSUE EXAM BY PATHOLOGIST: CPT | Performed by: PATHOLOGY

## 2025-05-08 ENCOUNTER — RESULTS FOLLOW-UP (OUTPATIENT)
Dept: GASTROENTEROLOGY | Facility: CLINIC | Age: 50
End: 2025-05-08

## 2025-05-21 ENCOUNTER — TELEPHONE (OUTPATIENT)
Age: 50
End: 2025-05-21

## 2025-05-21 DIAGNOSIS — I25.2 OLD MYOCARDIAL INFARCTION: Primary | ICD-10-CM

## 2025-05-21 DIAGNOSIS — E78.00 HYPERCHOLESTEREMIA: ICD-10-CM

## 2025-05-21 NOTE — TELEPHONE ENCOUNTER
Pt has cardio appt on 5/28/25.  Appt was cancelled and rescheduled for 5/28/25, referral effective date is out of range. Please place an updated AMB Referral for Cardio, then send to [facility] Auth Pool to update in [place].

## 2025-05-28 ENCOUNTER — OFFICE VISIT (OUTPATIENT)
Dept: CARDIOLOGY CLINIC | Facility: CLINIC | Age: 50
End: 2025-05-28

## 2025-05-28 VITALS
BODY MASS INDEX: 27.28 KG/M2 | SYSTOLIC BLOOD PRESSURE: 116 MMHG | DIASTOLIC BLOOD PRESSURE: 74 MMHG | HEIGHT: 68 IN | HEART RATE: 70 BPM | WEIGHT: 180 LBS

## 2025-05-28 DIAGNOSIS — E78.00 HYPERCHOLESTEREMIA: ICD-10-CM

## 2025-05-28 DIAGNOSIS — I10 PRIMARY HYPERTENSION: ICD-10-CM

## 2025-05-28 DIAGNOSIS — I25.10 CORONARY ARTERY DISEASE INVOLVING NATIVE CORONARY ARTERY OF NATIVE HEART WITHOUT ANGINA PECTORIS: Primary | ICD-10-CM

## 2025-05-28 NOTE — PROGRESS NOTES
Cardiology Follow Up    Eloy Thomas  1975  [id number]  [facility] CARDIAC CATH LAB  [address] [facility] [address]  [phone]    1. Coronary artery disease involving native coronary artery of native heart without angina pectoris        2. Primary hypertension        3. Hypercholesteremia            Interval History: Cardiology follow-up.  Patient was last seen on 9/22.  Patient states been asymptomatic from the cardiac point of view denies any chest pain or dyspnea.  States been compliant with low-cholesterol diet, lipids last checked on 12/24 total cholesterol 148, HDL 41, LDL of 85, acceptable control on medium intense statin therapy.  Compliant with low-sodium diet, blood pressure has been well-controlled.  Denies syncope or presyncope.  Denies any bleeding issues on chronic aspirin therapy.  He does not have an intermittently mildly elevated bilirubin, possibly Gilbert's syndrome.  Sleep hygiene is poor.  He is active mostly at work, no regular exercise, no exertional symptoms.    Problem List[1]  Past Medical History[2]  Social History     Socioeconomic History    Marital status: /Civil Union     Spouse name: Not on file    Number of children: Not on file    Years of education: Not on file    Highest education level: Not on file   Occupational History    Occupation: employed   Tobacco Use    Smoking status: Former    Smokeless tobacco: Never   Vaping Use    Vaping status: Former   Substance and Sexual Activity    Alcohol use: No    Drug use: Yes     Types: Marijuana    Sexual activity: Not Currently   Other Topics Concern    Not on file   Social History Narrative    Live with wife.     Supportive and Safe at home.    Sees dentist occasionally.      No mental or sub in self      Always uses seatbelt    Exercise: walking    No advance directive    No living will    Occasional caffeine consumption     Social Drivers  of Health     Financial Resource Strain: Not on file   Food Insecurity: Not on file   Transportation Needs: Not on file   Physical Activity: Not on file   Stress: Not on file   Social Connections: Not on file   Intimate Partner Violence: Not on file   Housing Stability: Not on file      Family History[3]  Past Surgical History[4]  Current Medications[5]  Allergies   Allergen Reactions    Penicillins Anaphylaxis and Other (See Comments)    Azithromycin      Annotation - [id number]: Listed in old chart.    Latex Rash       Labs:  Hospital Outpatient Visit on 05/02/2025   Component Date Value    Case Report 05/02/2025                      Value:Surgical Pathology Report                         Case: [id number]                                  Authorizing Provider:  Jorge Awad MD              Collected:           05/02/2025 1035              Ordering Location:     [facility] Received:            05/02/2025 2010              Pathologist:           Nestor Santacruz MD                                                            Specimens:   A) - Large Intestine, Right/Ascending Colon, ascending colon polyp bx                               B) - Large Intestine, Left/Descending Colon, descending colon polyp cold snare             Final Diagnosis 05/02/2025                      Value:A. Large Intestine, Right/Ascending Colon, ascending colon polyp bx:  Polypoid fragment of colon mucosa with focal hyperplastic   No active or microscopic colitis, serrated lesion, dysplasia or malignancy identified     B. Large Intestine, Left/Descending Colon, descending colon polyp cold snare:  Hyperplastic polyp, goblet cell rich variant         Note 05/02/2025                      Value:Interpretation performed at [address] [address]        Additional Information 05/02/2025                      Value:All reported additional testing was performed with appropriately reactive controls.  These tests  "were developed and their performance characteristics determined by [facility] or appropriate performing facility, though some tests may be performed on tissues which have not been validated for performance characteristics (such as staining performed on alcohol exposed cell blocks and decalcified tissues).  Results should be interpreted with caution and in the context of the patients’ clinical condition. These tests may not be cleared or approved by the U.S. Food and Drug Administration, though the FDA has determined that such clearance or approval is not necessary. These tests are used for clinical purposes and they should not be regarded as investigational or for research. This laboratory has been approved by CLIA 88, designated as a high-complexity laboratory and is qualified to perform these tests.  .      Synoptic Checklist 05/02/2025                      Value:                            COLON/RECTUM POLYP FORM - GI - All Specimens                                                                                     :    Other      Gross Description 05/02/2025                      Value:A. The specimen is received in formalin, labeled with the patient's name and hospital number, and is designated \" ascending colon polyp biopsy\".  The specimen consists of 1 tan-pink soft tissue fragment measuring 0.3 cm in greatest mention.  Entirely submitted. One screened cassette.   B. The specimen is received in formalin, labeled with the patient's name and hospital number, and is designated \" descending colon polyp\".  The specimen consists of 1 tan-pink soft tissue fragment measuring 0.7 x 0.3 x 0.2 cm.  Entirely submitted. One screened cassette.    Note: The estimated total formalin fixation time based upon information provided by the submitting clinician and the standard processing schedule is over 72 hours.  MSequino      Clinical Information 05/02/2025                      Value:· One 2 mm sessile " polyp in the ascending colon; performed cold forceps biopsy with complete en bloc removal  · One polyp measuring smaller than 5 mm in the descending colon; performed cold snare with complete en bloc removal and retrieved specimen     Orders Only on 01/03/2025   Component Date Value    Hemoglobin A1C 03/22/2025 6.4 (H)     Estimated Average Glucose 03/22/2025 137     Glucose, Random 03/22/2025 103 (H)     BUN 03/22/2025 14     Creatinine 03/22/2025 1.04     eGFR 03/22/2025 87     SL AMB BUN/CREATININE RA* 03/22/2025 13     Sodium 03/22/2025 138     Potassium 03/22/2025 5.3 (H)     Chloride 03/22/2025 102     CO2 03/22/2025 23     CALCIUM 03/22/2025 9.4    Orders Only on 01/03/2025   Component Date Value    25-HYDROXY VIT D 03/22/2025 37.3    Orders Only on 12/30/2024   Component Date Value    Prostate Specific Antige* 12/30/2024 1.3     PSA, Free 12/30/2024 0.59     PSA, Free Pct 12/30/2024 45.4    Office Visit on 12/24/2024   Component Date Value    White Blood Cell Count 12/30/2024 5.0     Red Blood Cell Count 12/30/2024 5.31     Hemoglobin 12/30/2024 15.6     HCT 12/30/2024 48.4     MCV 12/30/2024 91     MCH 12/30/2024 29.4     MCHC 12/30/2024 32.2     RDW 12/30/2024 12.1     Platelet Count 12/30/2024 259     Neutrophils 12/30/2024 51     Lymphocytes 12/30/2024 39     Monocytes 12/30/2024 7     Eosinophils 12/30/2024 1     Basophils PCT 12/30/2024 1     Neutrophils (Absolute) 12/30/2024 2.6     Lymphocytes (Absolute) 12/30/2024 1.9     Monocytes (Absolute) 12/30/2024 0.4     Eosinophils (Absolute) 12/30/2024 0.1     Basophils ABS 12/30/2024 0.0     Immature Granulocytes 12/30/2024 1     Immature Granulocytes (A* 12/30/2024 0.0     Glucose, Random 12/30/2024 105 (H)     BUN 12/30/2024 9     Creatinine 12/30/2024 1.11     eGFR 12/30/2024 80     SL AMB BUN/CREATININE RA* 12/30/2024 8 (L)     Sodium 12/30/2024 140     Potassium 12/30/2024 5.0     Chloride 12/30/2024 104     CO2 12/30/2024 24     CALCIUM 12/30/2024 9.5      Protein, Total 12/30/2024 6.1     Albumin 12/30/2024 4.2     Globulin, Total 12/30/2024 1.9     TOTAL BILIRUBIN 12/30/2024 0.4     Alk Phos Isoenzymes 12/30/2024 63     AST 12/30/2024 34     ALT 12/30/2024 35     Cholesterol, Total 12/30/2024 148     Triglycerides 12/30/2024 122     HDL 12/30/2024 41     VLDL Cholesterol Calcula* 12/30/2024 22     LDL Calculated 12/30/2024 85     LDl/HDL Ratio 12/30/2024 2.1     25-HYDROXY VIT D 12/30/2024 23.9 (L)     TSH 12/30/2024 1.220     Prostate Specific Antige* 12/30/2024 1.4      Imaging: Colonoscopy  Result Date: 5/2/2025  Narrative: Table formatting from the original result was not included. [facility] [address] [place] [id number] [phone] DATE OF SERVICE: 5/02/25 PHYSICIAN(S): Attending: Jorge Awad MD Fellow: No Staff Documented INDICATION: Screening for colorectal cancer POST-OP DIAGNOSIS: See the impression below. HISTORY: Prior colonoscopy: 5 years ago. BOWEL PREPARATION: Miralax/Dulcolax PREPROCEDURE: Informed consent was obtained for the procedure, including sedation. Risks including but not limited to bleeding, infection, perforation, adverse drug reaction and aspiration were explained in detail. Also explained about less than 100% sensitivity with the exam and other alternatives. The patient was placed in the left lateral decubitus position. Procedure: Colonoscopy DETAILS OF PROCEDURE: Patient was taken to the procedure room where a time out was performed to confirm correct patient and correct procedure. The patient underwent monitored anesthesia care, which was administered by an anesthesia professional. The patient's blood pressure, ECG, ETCO2, heart rate, level of consciousness, oxygen and respirations were monitored throughout the procedure. A digital rectal exam was performed. The scope was introduced through the anus and advanced to the cecum. Retroflexion was performed in the rectum. The quality of  bowel preparation was evaluated using the [place] Bowel Preparation Scale with scores of: right colon = 3, transverse colon = 3, left colon = 3. The total BBPS score was 9. Bowel prep was adequate. The patient experienced no blood loss. The procedure was not difficult. The patient tolerated the procedure well. There were no apparent adverse events. ANESTHESIA INFORMATION: ASA: III Anesthesia Type: IV Sedation with Anesthesia MEDICATIONS: lactated ringers infusion 200 mL*  *From user-documented volume (Totals for administrations occurring from 1025 to 1041 on 05/02/25) FINDINGS: One 2 mm sessile polyp in the ascending colon; performed cold forceps biopsy with complete en bloc removal One polyp measuring smaller than 5 mm in the descending colon; performed cold snare with complete en bloc removal and retrieved specimen Internal large hemorrhoids Few scattered diverticula in the sigmoid colon EVENTS: Procedure Events Event Event Time ENDO CECUM REACHED 5/2/2025 10:30 AM SPECIMENS: ID Type Source Tests Collected by Time Destination 1 : ascending colon polyp bx Tissue Large Intestine, Right/Ascending Colon TISSUE EXAM Jorge Awad MD 5/2/2025 10:35 AM  EQUIPMENT: Colonoscope -[id number] VolanceUFF VISION LRG GREEN ID [id number]     Impression: 2 polyps Large hemorrhoids Scattered diverticulosis in the sigmoid colon Polyps were removed RECOMMENDATION: Await pathology results Repeat colonoscopy in 7 years, due: 4/30/2032 Personal history of colon polyps  Plan for hemorrhoidal banding. Resume home meds. Resume previous diet. Follow up with your primary care provider as previously scheduled. Follow up with GI as needed. The biopsy results will be available in [employer] in 1-2 weeks.    Jorge Awad MD       Review of Systems:  Review of Systems   Constitutional:  Negative for activity change, diaphoresis, fatigue and fever.   HENT:  Negative for hearing loss and nosebleeds.    Eyes:  Negative for visual disturbance.   Respiratory:  Negative  for apnea, chest tightness, shortness of breath, wheezing and stridor.    Cardiovascular:  Negative for chest pain, palpitations and leg swelling.   Gastrointestinal:  Positive for blood in stool (a awaiting hemorrhoidal banding). Negative for abdominal pain and anal bleeding.   Endocrine: Negative for cold intolerance.   Genitourinary:  Negative for hematuria.   Musculoskeletal:  Negative for arthralgias, gait problem and myalgias.   Skin:  Negative for pallor and rash.   Allergic/Immunologic: Negative for immunocompromised state.   Neurological:  Negative for dizziness, syncope and weakness.   Hematological:  Does not bruise/bleed easily.   Psychiatric/Behavioral:  Negative for sleep disturbance. The patient is not nervous/anxious.        Physical Exam:  Physical Exam  Vitals reviewed.   Constitutional:       General: He is not in acute distress.     Appearance: Normal appearance. He is normal weight. He is not ill-appearing, toxic-appearing or diaphoretic.     Eyes:      General: No scleral icterus.    Neck:      Vascular: No carotid bruit.     Cardiovascular:      Rate and Rhythm: Normal rate and regular rhythm.      Pulses: Normal pulses.      Heart sounds: Normal heart sounds. No murmur heard.     No friction rub. No gallop.   Pulmonary:      Effort: Pulmonary effort is normal. No respiratory distress.      Breath sounds: Normal breath sounds. No stridor. No wheezing, rhonchi or rales.     Musculoskeletal:      Right lower leg: No edema.      Left lower leg: No edema.     Skin:     General: Skin is warm and dry.      Capillary Refill: Capillary refill takes less than 2 seconds.      Coloration: Skin is not jaundiced or pale.      Findings: No bruising or erythema.     Neurological:      Mental Status: He is alert and oriented to person, place, and time.     Psychiatric:         Mood and Affect: Mood normal.         Discussion/Summary: Coronary artery disease, premature, status post anterior myocardial  infarction   2015, at age 39.  PTCA/drug stent of the LAD. Residual 60% marginal 50% RCA lesions.  Stress test 2020, he did 11 minutes on a Maxi protocol there was no EKG criteria for ischemia or symptoms.  Echocardiogram 2021, revealed normal left ventricular systolic function with normal diastolic parameters and mild mitral insufficiency.  Continue current medications, patient is due for stress test.  Regular yearly follow-up recommended.  Will repeat lipid profile, consideration for adding Zetia therapy..       This note was completed in part utilizing [employer] direct voice recognition software.   Grammatical errors, random word insertion, spelling mistakes, and incomplete sentences may be an occasional consequence of the system secondary to software limitations, ambient noise and hardware issues. At the time of dictation, efforts were made to edit, clarify and /or correct errors.  Please read the chart carefully and recognize, using context, where substitutions have occurred.  If you have any questions or concerns about the context, text or information contained within the body of this dictation, please contact myself, the provider, for further clarification.        [1]   Patient Active Problem List  Diagnosis    Anxiety    Coronary artery disease    Glaucoma    Pain of right heel    HTN (hypertension)    Hypercholesteremia    Impaired fasting glucose    Lump of skin    Sciatica    Vitamin D deficiency    Old myocardial infarction    LUQ pain    Colon polyps    Grade III hemorrhoids    Left lower quadrant abdominal pain    Elevated bilirubin   [2]   Past Medical History:  Diagnosis Date    Anterolateral myocardial infarction (HCC)     last assessed 10/30/17    Chest pain     Colon polyp     Coronary artery disease     Glaucoma     Heart disease     Hypercholesterolemia     Hypertension     Myocardial infarction (HCC)     Uveitis     Visual impairment    [3]   Family History  Problem Relation Name Age of  Onset    Blindness Mother          retinitis pigmentosis    Heart attack Father          1st heart attack at age 35- had CABG in past    Diabetes Father      Blindness Maternal Aunt          retinitis pigmentosis    Dislocations Family      Heart disease Family      Hypertension Family      Mental illness Neg Hx      Substance Abuse Neg Hx      Colon cancer Neg Hx      Colon polyps Neg Hx     [4]   Past Surgical History:  Procedure Laterality Date    APPENDECTOMY      CARDIAC CATHETERIZATION      CARDIAC SURGERY      COLONOSCOPY      CORONARY STENT PLACEMENT      EYE SURGERY      TONSILLECTOMY     [5]   Current Outpatient Medications:     albuterol (ProAir HFA) 90 mcg/act inhaler, Inhale 2 puffs every 6 (six) hours as needed for wheezing, Disp: 8.5 g, Rfl: 0    aspirin (ECOTRIN LOW STRENGTH) 81 mg EC tablet, Take 81 mg by mouth daily, Disp: , Rfl:     atorvastatin (LIPITOR) 40 mg tablet, Take 1 tablet (40 mg total) by mouth daily, Disp: 90 tablet, Rfl: 3    benzonatate (TESSALON) 200 MG capsule, Take 1 capsule (200 mg total) by mouth 3 (three) times a day as needed for cough, Disp: 20 capsule, Rfl: 0    Cholecalciferol (VITAMIN D3) 2000 units TABS, Take 1 tablet by mouth daily, Disp: , Rfl:     [employer] FIBER GUMMY BEARS CHILDREN PO, Take 2 each by mouth daily, Disp: , Rfl:     dorzolamide-timolol (COSOPT) 22.3-6.8 MG/ML ophthalmic solution, INSTILL 1 DROP INTO LEFT EYE TWICE DAILY, Disp: , Rfl:     fluorometholone (FML) 0.1 % ophthalmic suspension, Administer 1 drop into the left eye daily, Disp: , Rfl:     fluticasone (FLONASE) 50 mcg/act nasal spray, 1 spray into each nostril daily, Disp: 16 g, Rfl: 5    lisinopril (ZESTRIL) 20 mg tablet, Take 1 tablet (20 mg total) by mouth daily, Disp: 90 tablet, Rfl: 3    metoprolol succinate (TOPROL-XL) 50 mg 24 hr tablet, Take 1 tablet (50 mg total) by mouth daily, Disp: 90 tablet, Rfl: 3    nitroglycerin (Nitrostat) 0.4 mg SL tablet, Place 1 tablet (0.4 mg total) under the  tongue every 5 (five) minutes as needed for chest pain (Patient not taking: Reported on 12/24/2024), Disp: 25 tablet, Rfl: 3    pantoprazole (PROTONIX) 20 mg tablet, Take 1 tablet (20 mg total) by mouth daily, Disp: 90 tablet, Rfl: 1

## 2025-06-06 ENCOUNTER — OFFICE VISIT (OUTPATIENT)
Dept: GASTROENTEROLOGY | Facility: CLINIC | Age: 50
End: 2025-06-06
Payer: COMMERCIAL

## 2025-06-06 ENCOUNTER — TRANSCRIBE ORDERS (OUTPATIENT)
Dept: GASTROENTEROLOGY | Facility: CLINIC | Age: 50
End: 2025-06-06

## 2025-06-06 VITALS
SYSTOLIC BLOOD PRESSURE: 116 MMHG | HEIGHT: 68 IN | BODY MASS INDEX: 26.86 KG/M2 | DIASTOLIC BLOOD PRESSURE: 78 MMHG | WEIGHT: 177.2 LBS

## 2025-06-06 DIAGNOSIS — K64.9 HEMORRHOIDS, UNSPECIFIED HEMORRHOID TYPE: Primary | ICD-10-CM

## 2025-06-06 DIAGNOSIS — K62.5 RECTAL BLEEDING: ICD-10-CM

## 2025-06-06 PROCEDURE — PBNCHG PB NO CHARGE PLACEHOLDER: Performed by: INTERNAL MEDICINE

## 2025-06-06 PROCEDURE — 46221 LIGATION OF HEMORRHOID(S): CPT | Performed by: INTERNAL MEDICINE

## 2025-06-06 NOTE — PROGRESS NOTES
[facility] - Hemorrhoid Banding Eloy Thomas 49 y.o. male MRN: [id number]  Encounter: [id number]    ASSESSMENT AND PLAN:    The {area:[id number]} hemorrhoid area was banded 6/6/2025. The patient was instructed to avoid constipation and straining, and educated in appropriate fiber intake.     HPI: Eloy Thomas is a 49 y.o. year old male who presents with rectal bleeding due to hemorrhoids.     Previous treatments include: ***  Bands were previously placed: ***   Current Fiber intake: encouraged supplementation  Complications of prior therapy include: ***    The patient provided consent for banding  and was placed in the left lateral position  Rectal exam showed ***  The [place] ligator was advanced and a band was applied without difficulty   Repeat rectal exam confirmed appropriate placement  The patient was discharged home after observation in the waiting area  The exam was chaperoned by nursing staff

## 2025-06-06 NOTE — PROGRESS NOTES
[facility] - Hemorrhoid Banding Eloy Thomas 49 y.o. male MRN: [id number]  Encounter: [id number]    ASSESSMENT AND PLAN:    The left lateral hemorrhoid area was banded 6/6/2025. The patient was instructed to avoid constipation and straining, and educated in appropriate fiber intake.     HPI: Eloy Thomas is a 49 y.o. year old male who presents with rectal bleeding due to hemorrhoids.     Previous treatments include: History of 1 banding which looks to be in the right posterior region.  Bands were previously placed: Right posterior banding  Current Fiber intake: encouraged supplementation  Complications of prior therapy include: None    The patient provided consent for banding  and was placed in the left lateral position  Rectal exam showed prolapsing hemorrhoid in the left lateral location  The [place] ligator was advanced and a band was applied without difficulty   Repeat rectal exam confirmed appropriate placement  The patient was discharged home after observation

## 2025-06-12 ENCOUNTER — OFFICE VISIT (OUTPATIENT)
Dept: GASTROENTEROLOGY | Facility: CLINIC | Age: 50
End: 2025-06-12
Payer: COMMERCIAL

## 2025-06-12 VITALS
SYSTOLIC BLOOD PRESSURE: 116 MMHG | DIASTOLIC BLOOD PRESSURE: 74 MMHG | WEIGHT: 176 LBS | HEIGHT: 68 IN | BODY MASS INDEX: 26.67 KG/M2

## 2025-06-12 DIAGNOSIS — K62.5 RECTAL BLEEDING: ICD-10-CM

## 2025-06-12 DIAGNOSIS — K64.9 HEMORRHOIDS, UNSPECIFIED HEMORRHOID TYPE: Primary | ICD-10-CM

## 2025-06-12 PROCEDURE — 46221 LIGATION OF HEMORRHOID(S): CPT | Performed by: INTERNAL MEDICINE

## 2025-06-12 PROCEDURE — PBNCHG PB NO CHARGE PLACEHOLDER: Performed by: INTERNAL MEDICINE

## 2025-06-12 NOTE — PROGRESS NOTES
[facility] - Hemorrhoid Banding Eloy Thomas 49 y.o. male MRN: [id number]  Encounter: [id number]    ASSESSMENT AND PLAN:    The right anterior hemorrhoid area was banded 6/12/2025. The patient was instructed to avoid constipation and straining, and educated in appropriate fiber intake.     HPI: Eloy Thomas is a 49 y.o. year old male who presents with rectal bleeding due to hemorrhoids.     Previous treatments include: Left lateral  Bands were previously placed: Left lateral  Current Fiber intake: encouraged supplementation  Complications of prior therapy include: None    The patient provided consent for banding  and was placed in the left lateral position  Rectal exam showed prolapsing internal hemorrhoids.   The [place] ligator was advanced and a band was applied without difficulty   Repeat rectal exam confirmed appropriate placement  The patient was discharged home after observation in the waiting area

## 2025-06-23 DIAGNOSIS — R10.12 LUQ PAIN: ICD-10-CM

## 2025-06-23 RX ORDER — PANTOPRAZOLE SODIUM 20 MG/1
20 TABLET, DELAYED RELEASE ORAL DAILY
Qty: 90 TABLET | Refills: 1 | Status: SHIPPED | OUTPATIENT
Start: 2025-06-23

## 2025-06-27 ENCOUNTER — OFFICE VISIT (OUTPATIENT)
Dept: FAMILY MEDICINE CLINIC | Facility: HOSPITAL | Age: 50
End: 2025-06-27
Payer: COMMERCIAL

## 2025-06-27 VITALS
HEIGHT: 68 IN | HEART RATE: 73 BPM | OXYGEN SATURATION: 97 % | DIASTOLIC BLOOD PRESSURE: 78 MMHG | WEIGHT: 177 LBS | BODY MASS INDEX: 26.83 KG/M2 | SYSTOLIC BLOOD PRESSURE: 122 MMHG

## 2025-06-27 DIAGNOSIS — E55.9 VITAMIN D DEFICIENCY: ICD-10-CM

## 2025-06-27 DIAGNOSIS — H40.1130 PRIMARY OPEN ANGLE GLAUCOMA OF BOTH EYES, UNSPECIFIED GLAUCOMA STAGE: ICD-10-CM

## 2025-06-27 DIAGNOSIS — R73.01 IMPAIRED FASTING GLUCOSE: ICD-10-CM

## 2025-06-27 DIAGNOSIS — I25.10 CORONARY ARTERY DISEASE INVOLVING NATIVE CORONARY ARTERY OF NATIVE HEART WITHOUT ANGINA PECTORIS: Primary | ICD-10-CM

## 2025-06-27 DIAGNOSIS — I10 PRIMARY HYPERTENSION: ICD-10-CM

## 2025-06-27 DIAGNOSIS — F41.9 ANXIETY: ICD-10-CM

## 2025-06-27 DIAGNOSIS — Z86.69 HISTORY OF UVEITIS: ICD-10-CM

## 2025-06-27 DIAGNOSIS — K63.5 POLYP OF COLON, UNSPECIFIED PART OF COLON, UNSPECIFIED TYPE: ICD-10-CM

## 2025-06-27 DIAGNOSIS — E78.00 HYPERCHOLESTEREMIA: ICD-10-CM

## 2025-06-27 PROBLEM — R10.32 LEFT LOWER QUADRANT ABDOMINAL PAIN: Status: RESOLVED | Noted: 2024-10-09 | Resolved: 2025-06-27

## 2025-06-27 PROBLEM — R10.12 LUQ PAIN: Status: RESOLVED | Noted: 2021-02-19 | Resolved: 2025-06-27

## 2025-06-27 PROCEDURE — 99214 OFFICE O/P EST MOD 30 MIN: CPT | Performed by: INTERNAL MEDICINE

## 2025-06-27 NOTE — PATIENT INSTRUCTIONS
Validated Blood Pressure Machines (Prices as of 2/5/2025):    Model  Champion Where to Buy    Omron 5 series [id number] - Wireless  $77.50  Internet   Omron 7 series GH1034- Wireless  $99.99  Internet   Omron 10 series [id number] -Wireless  $105  Internet   * Omron 10 Recommended        Omron Bronze 5100  $40.21  Internet   Omron Silver BP 5250- Wireless  $59.99  Internet   Omron Gold 5350  $76.74  Internet   Omron Platinum 5450  $89.99  Internet   A&D Ultraconnect Wireless and Hoseless  $99.99  Internet           Omron 3 series [id number]  $59.99  Drug Store           Talking device:       A&D UA 1030T $88 $89.99  Internet           Option of XL:       Ortiz [place] Home BP Monitor 1700 series (3 cuff sizes) $119.99 stnd +$22 for XL Internet   Ortiz Teri [id number] $172 $195.95  Internet           Wrist cuff:       Omron [id number] $89.99  Internet

## 2025-06-27 NOTE — ASSESSMENT & PLAN NOTE
Sees - left eye is worse thatn right- on cosopt and fml   This was due to uveitis   Mother had retinitis pigmentosa

## 2025-06-27 NOTE — PROGRESS NOTES
Assessment/Plan:     Diagnosis ICD-10-CM Associated Orders   1. Coronary artery disease involving native coronary artery of native heart without angina pectoris  I25.10 CBC and differential     CBC and differential      2. Primary hypertension  I10 CBC and differential     Comprehensive metabolic panel     Lipid Panel with Direct LDL reflex     CBC and differential     Comprehensive metabolic panel     Lipid Panel with Direct LDL reflex      3. Impaired fasting glucose  R73.01 Hemoglobin A1C     Hemoglobin A1C      4. Anxiety  F41.9       5. Primary open angle glaucoma of both eyes, unspecified glaucoma stage  H40.1130       6. History of uveitis  Z86.69       7. Polyp of colon, unspecified part of colon, unspecified type  K63.5       8. Hypercholesteremia  E78.00       9. Vitamin D deficiency  E55.9           Problem List Items Addressed This Visit          Cardiovascular and Mediastinum    Coronary artery disease - Primary    Seen recently by Dr. Blacno-no chest pain or sob with activity=- has upcoming stress test         Relevant Orders    CBC and differential    HTN (hypertension)    To check weekly         Relevant Orders    CBC and differential    Comprehensive metabolic panel    Lipid Panel with Direct LDL reflex       Digestive    Colon polyps    Seeing Dr. Awad- to have repeat in 6 years            Endocrine    Impaired fasting glucose    Relevant Orders    Hemoglobin A1C       Behavioral Health    Anxiety       Eye    Glaucoma    Sees - left eye is worse thatn right- on cosopt and fml   This was due to uveitis   Mother had retinitis pigmentosa         History of uveitis    Last episode over 20 years            Other    Hypercholesteremia    Vitamin D deficiency         Return in about 4 months (around 10/27/2025) for Recheck.      Subjective:    Patient ID: Eloy Thomas is a 49 y.o. male    Here for  follow up   Overall doing well   Father in law had  LVAD and wife is helping        The  "following portions of the patient's history were reviewed and updated as appropriate: allergies, current medications and problem list.     Review of Systems   Constitutional:  Negative for fatigue.   HENT:  Negative for congestion.    Cardiovascular:  Negative for chest pain and palpitations.   Gastrointestinal:  Negative for abdominal pain.   All other systems reviewed and are negative.        Objective:    Current Medications[1]    Blood pressure 122/78, pulse 73, height 5' 8\" (1.727 m), weight 80.3 kg (177 lb), SpO2 97%.     Physical Exam  Vitals and nursing note reviewed.   Constitutional:       General: He is not in acute distress.  HENT:      Head: Normocephalic.      Right Ear: Tympanic membrane normal. There is no impacted cerumen.      Left Ear: Tympanic membrane normal. There is no impacted cerumen.      Nose: No congestion.      Mouth/Throat:      Pharynx: No posterior oropharyngeal erythema.     Eyes:      General:         Right eye: No discharge.         Left eye: No discharge.      Conjunctiva/sclera: Conjunctivae normal.      Pupils: Pupils are equal, round, and reactive to light.       Cardiovascular:      Rate and Rhythm: Normal rate and regular rhythm.      Heart sounds: No murmur heard.  Pulmonary:      Effort: Pulmonary effort is normal.      Breath sounds: No wheezing or rhonchi.   Abdominal:      General: There is no distension.      Palpations: Abdomen is soft.      Tenderness: There is no abdominal tenderness. There is no right CVA tenderness, left CVA tenderness or guarding.     Musculoskeletal:         General: No tenderness.      Cervical back: No rigidity or tenderness.      Right lower leg: No edema.      Left lower leg: No edema.     Skin:     Findings: No erythema.     Neurological:      Mental Status: He is alert and oriented to person, place, and time.      Motor: No weakness.     Psychiatric:         Mood and Affect: Mood normal.         Thought Content: Thought content normal.    "      Judgment: Judgment normal.               [1]   Current Outpatient Medications:     aspirin (ECOTRIN LOW STRENGTH) 81 mg EC tablet, Take 81 mg by mouth in the morning., Disp: , Rfl:     atorvastatin (LIPITOR) 40 mg tablet, Take 1 tablet (40 mg total) by mouth daily, Disp: 90 tablet, Rfl: 3    Cholecalciferol (VITAMIN D3) 2000 units TABS, Take 1 tablet by mouth in the morning., Disp: , Rfl:     [employer] CHILDREN PO, Take 2 each by mouth in the morning., Disp: , Rfl:     dorzolamide-timolol (COSOPT) 22.3-6.8 MG/ML ophthalmic solution, , Disp: , Rfl:     fluorometholone (FML) 0.1 % ophthalmic suspension, Administer 1 drop into the left eye in the morning., Disp: , Rfl:     fluticasone (FLONASE) 50 mcg/act nasal spray, 1 spray into each nostril daily, Disp: 16 g, Rfl: 5    lisinopril (ZESTRIL) 20 mg tablet, Take 1 tablet (20 mg total) by mouth daily, Disp: 90 tablet, Rfl: 3    metoprolol succinate (TOPROL-XL) 50 mg 24 hr tablet, Take 1 tablet (50 mg total) by mouth daily, Disp: 90 tablet, Rfl: 3    nitroglycerin (Nitrostat) 0.4 mg SL tablet, Place 1 tablet (0.4 mg total) under the tongue every 5 (five) minutes as needed for chest pain, Disp: 25 tablet, Rfl: 3    pantoprazole (PROTONIX) 20 mg tablet, Take 1 tablet by mouth once daily, Disp: 90 tablet, Rfl: 1

## 2025-07-07 ENCOUNTER — TELEPHONE (OUTPATIENT)
Age: 50
End: 2025-07-07

## 2025-07-07 DIAGNOSIS — S00.219A SCRATCH OF EYE REGION: Primary | ICD-10-CM

## 2025-07-07 NOTE — TELEPHONE ENCOUNTER
Patient is requesting an insurance referral for the following specialty: Optometrist     Test Name / Order Name: Vaughn     DX Code:     Date Of Service: 7/7/2025    Location/Facility Name/Address/Phone #:   [address] [address]    Location / Facility NPI: [id number]    Best Phone # To Reach The Patient:      Patient has appt for today due to scratched eye @ 1100 am. Pt in need of AMB and Insurance referral.        Please review.  Thank you

## 2025-07-10 ENCOUNTER — HOSPITAL ENCOUNTER (OUTPATIENT)
Dept: NON INVASIVE DIAGNOSTICS | Age: 50
Discharge: HOME | End: 2025-07-10
Attending: INTERNAL MEDICINE
Payer: COMMERCIAL

## 2025-07-10 DIAGNOSIS — I25.10 CORONARY ARTERY DISEASE INVOLVING NATIVE CORONARY ARTERY OF NATIVE HEART WITHOUT ANGINA PECTORIS: ICD-10-CM

## 2025-07-10 DIAGNOSIS — I10 PRIMARY HYPERTENSION: ICD-10-CM

## 2025-07-10 DIAGNOSIS — E78.00 HYPERCHOLESTEREMIA: ICD-10-CM

## 2025-07-10 LAB
CHEST PAIN STATEMENT: NORMAL
MAX DIASTOLIC BP: 92 MMHG
MAX HR PERCENT: 95 %
MAX HR: 160 BPM
MAX PREDICTED HEART RATE: 168 BPM
PROTOCOL NAME: NORMAL
RATE PRESSURE PRODUCT: NORMAL
REASON FOR TERMINATION: NORMAL
SL CV STRESS STAGE REACHED: 5
STRESS ANGINA INDEX: 0
STRESS BASELINE HR: 76 BPM
STRESS PEAK HR: 160 BPM
STRESS POST ESTIMATED WORKLOAD: 17.2 METS
STRESS POST EXERCISE DUR MIN: 13 MIN
STRESS POST EXERCISE DUR MIN: 13 MIN
STRESS POST EXERCISE DUR SEC: 22 SEC
STRESS POST EXERCISE DUR SEC: 22 SEC
STRESS POST PEAK BP: 164 MMHG
STRESS POST PEAK HR: 160 BPM
STRESS POST PEAK SYSTOLIC BP: 164 MMHG
TARGET HR FORMULA: NORMAL
TEST INDICATION: NORMAL

## 2025-07-10 PROCEDURE — 93016 CV STRESS TEST SUPVJ ONLY: CPT | Performed by: INTERNAL MEDICINE

## 2025-07-10 PROCEDURE — 93017 CV STRESS TEST TRACING ONLY: CPT

## 2025-07-10 PROCEDURE — 93018 CV STRESS TEST I&R ONLY: CPT | Performed by: INTERNAL MEDICINE

## 2025-07-16 LAB
CHEST PAIN STATEMENT: NORMAL
MAX DIASTOLIC BP: 92 MMHG
MAX PREDICTED HEART RATE: 168 BPM
PROTOCOL NAME: NORMAL
REASON FOR TERMINATION: NORMAL
STRESS POST EXERCISE DUR MIN: 13 MIN
STRESS POST EXERCISE DUR SEC: 22 SEC
STRESS POST PEAK HR: 160 BPM
STRESS POST PEAK SYSTOLIC BP: 164 MMHG
TARGET HR FORMULA: NORMAL
TEST INDICATION: NORMAL

## 2025-08-05 ENCOUNTER — TELEPHONE (OUTPATIENT)
Age: 50
End: 2025-08-05

## 2025-08-18 ENCOUNTER — TELEPHONE (OUTPATIENT)
Age: 50
End: 2025-08-18